# Patient Record
Sex: FEMALE | Race: WHITE | NOT HISPANIC OR LATINO | Employment: OTHER | URBAN - METROPOLITAN AREA
[De-identification: names, ages, dates, MRNs, and addresses within clinical notes are randomized per-mention and may not be internally consistent; named-entity substitution may affect disease eponyms.]

---

## 2024-07-09 ENCOUNTER — APPOINTMENT (OUTPATIENT)
Dept: GENERAL RADIOLOGY | Facility: CLINIC | Age: 49
End: 2024-07-09
Attending: FAMILY MEDICINE
Payer: COMMERCIAL

## 2024-07-09 ENCOUNTER — HOSPITAL ENCOUNTER (EMERGENCY)
Facility: CLINIC | Age: 49
Discharge: HOME | End: 2024-07-09
Attending: FAMILY MEDICINE | Admitting: FAMILY MEDICINE
Payer: COMMERCIAL

## 2024-07-09 VITALS
OXYGEN SATURATION: 100 % | HEART RATE: 80 BPM | RESPIRATION RATE: 16 BRPM | TEMPERATURE: 98.6 F | DIASTOLIC BLOOD PRESSURE: 88 MMHG | SYSTOLIC BLOOD PRESSURE: 138 MMHG
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
## 2024-07-09 DIAGNOSIS — S40.012A CONTUSION OF LEFT SHOULDER, INITIAL ENCOUNTER: ICD-10-CM

## 2024-07-09 PROCEDURE — 73030 X-RAY EXAM OF SHOULDER: CPT | Mod: LT

## 2024-07-09 PROCEDURE — 99284 EMERGENCY DEPT VISIT MOD MDM: CPT | Performed by: FAMILY MEDICINE

## 2024-07-09 PROCEDURE — 96372 THER/PROPH/DIAG INJ SC/IM: CPT | Performed by: FAMILY MEDICINE

## 2024-07-09 PROCEDURE — 250N000013 HC RX MED GY IP 250 OP 250 PS 637: Performed by: FAMILY MEDICINE

## 2024-07-09 PROCEDURE — 99284 EMERGENCY DEPT VISIT MOD MDM: CPT

## 2024-07-09 PROCEDURE — 250N000011 HC RX IP 250 OP 636: Performed by: FAMILY MEDICINE

## 2024-07-09 RX ORDER — BUPRENORPHINE AND NALOXONE 8; 2 MG/1; MG/1
1 FILM, SOLUBLE BUCCAL; SUBLINGUAL 2 TIMES DAILY PRN
COMMUNITY

## 2024-07-09 RX ORDER — ACETAMINOPHEN 500 MG
1000 TABLET ORAL ONCE
Status: COMPLETED | OUTPATIENT
Start: 2024-07-09 | End: 2024-07-09

## 2024-07-09 RX ORDER — KETOROLAC TROMETHAMINE 15 MG/ML
15 INJECTION, SOLUTION INTRAMUSCULAR; INTRAVENOUS ONCE
Status: COMPLETED | OUTPATIENT
Start: 2024-07-09 | End: 2024-07-09

## 2024-07-09 RX ORDER — IBUPROFEN 800 MG/1
800 TABLET, FILM COATED ORAL EVERY 8 HOURS PRN
Qty: 15 TABLET | Refills: 0 | Status: SHIPPED | OUTPATIENT
Start: 2024-07-09 | End: 2024-07-14

## 2024-07-09 RX ADMIN — KETOROLAC TROMETHAMINE 15 MG: 15 INJECTION, SOLUTION INTRAMUSCULAR; INTRAVENOUS at 19:58

## 2024-07-09 RX ADMIN — ACETAMINOPHEN 1000 MG: 500 TABLET ORAL at 20:37

## 2024-07-09 ASSESSMENT — COLUMBIA-SUICIDE SEVERITY RATING SCALE - C-SSRS
6. HAVE YOU EVER DONE ANYTHING, STARTED TO DO ANYTHING, OR PREPARED TO DO ANYTHING TO END YOUR LIFE?: NO
1. IN THE PAST MONTH, HAVE YOU WISHED YOU WERE DEAD OR WISHED YOU COULD GO TO SLEEP AND NOT WAKE UP?: NO
2. HAVE YOU ACTUALLY HAD ANY THOUGHTS OF KILLING YOURSELF IN THE PAST MONTH?: NO

## 2024-07-09 ASSESSMENT — ACTIVITIES OF DAILY LIVING (ADL)
ADLS_ACUITY_SCORE: 37

## 2024-07-10 NOTE — ED PROVIDER NOTES
[facility] EMERGENCY DEPARTMENT ([facility])    7/09/24      ED PROVIDER NOTE       History     Chief Complaint   Patient presents with    Shoulder Injury     HPI  Ambar Borges is a 49 year old female with a history of malignant neoplasm of thyroid gland who presents with a shoulder injury.  Patient was walking her dog and fell with the dog pulling on the leash landing on her left shoulder is now having increased left shoulder pain worse with movement.    Past Medical History  Past Medical History:   Diagnosis Date    Chronic low back pain     Primary thyroid cancer (H)      Past Surgical History:   Procedure Laterality Date    HC HEMORROIDECTOMY, EXTERNAL, SINGLE COLUMN/GROUP  2006    HC REPAIR OF NASAL SEPTUM  2008    HC TOOTH EXTRACTION W/FORCEP  2010    nerve damage to sensation of lower jaw    MAMMOPLASTY AUGMENTATION  2004    THYROIDECTOMY  6/1/2012    Procedure:THYROIDECTOMY; Total Thyroidectomy and Selective Neck Dissection; Surgeon:KELSEY DONOVAN; Location:[facility] OR     buprenorphine HCl-naloxone HCl (SUBOXONE) 8-2 MG per film  ibuprofen (ADVIL/MOTRIN) 800 MG tablet  levothyroxine (SYNTHROID, LEVOTHROID) 150 MCG tablet  B Complex Vitamins (VITAMIN  B COMPLEX) tablet  cholecalciferol (VITAMIN D3) 5000 UNIT CAPS capsule  HYDROcodone-acetaminophen  MG per tablet  ibuprofen (ADVIL) 200 MG capsule  METHADONE HCL PO  Multiple Vitamin (MULTI-VITAMIN) per tablet  PROGESTERONE MICRONIZED PO  sertraline (ZOLOFT) 50 MG tablet  tiZANidine (ZANAFLEX) 4 MG tablet  traMADol (ULTRAM) 25 MG TABS      No Known Allergies  Family History  No family history on file.  Social History   Social History     Substance Use Topics    Alcohol use: Yes     Comment: Infrequently    Drug use: No      A medically appropriate review of systems was performed with pertinent positives and negatives noted in the HPI, and all other systems negative.    Physical Exam   BP: 135/83  Pulse: 107  Temp: 98.6  F (37  C)  Resp: 16  SpO2:  99 %  Physical Exam  Constitutional:       General: She is not in acute distress.     Appearance: Normal appearance. She is not diaphoretic.   HENT:      Head: Atraumatic.      Mouth/Throat:      Mouth: Mucous membranes are moist.   Eyes:      General: No scleral icterus.     Conjunctiva/sclera: Conjunctivae normal.   Cardiovascular:      Rate and Rhythm: Normal rate.      Heart sounds: Normal heart sounds.   Pulmonary:      Effort: No respiratory distress.      Breath sounds: Normal breath sounds.   Abdominal:      General: Abdomen is flat.   Musculoskeletal:      Left shoulder: Tenderness present. Decreased range of motion.      Cervical back: Neck supple.   Skin:     General: Skin is warm.      Findings: No rash.   Neurological:      General: No focal deficit present.      Mental Status: She is alert and oriented to person, place, and time.      Sensory: No sensory deficit.      Motor: No weakness.      Coordination: Coordination normal.           ED Course, Procedures, & Data      Procedures         Results for orders placed or performed during the hospital encounter of 07/09/24   XR Shoulder Left 3 Views     Status: None    Narrative    EXAM: XR SHOULDER LEFT G/E 3 VIEWS  LOCATION: [facility]  DATE: 7/9/2024    INDICATION: fall, pain  COMPARISON: None.      Impression    IMPRESSION: No acute fracture or malalignment. No significant degenerative changes.     Medications   ketorolac (TORADOL) injection 15 mg (15 mg Intramuscular $Given 7/9/24 1958)   acetaminophen (TYLENOL) tablet 1,000 mg (1,000 mg Oral $Given 7/9/24 2037)     Labs Ordered and Resulted from Time of ED Arrival to Time of ED Departure - No data to display  XR Shoulder Left 3 Views   Final Result   IMPRESSION: No acute fracture or malalignment. No significant degenerative changes.             Critical care was not performed.     Medical Decision Making  The patient's presentation was of moderate  complexity (an acute illness with systemic symptoms).    The patient's evaluation involved:  ordering and/or review of 1 test(s) in this encounter (see separate area of note for details)    The patient's management necessitated moderate risk (prescription drug management including medications given in the ED).    Assessment & Plan        I have reviewed the nursing notes. I have reviewed the findings, diagnosis, plan and need for follow up with the patient.    Discharge Medication List as of 7/9/2024 10:15 PM        START taking these medications    Details   ibuprofen (ADVIL/MOTRIN) 800 MG tablet Take 1 tablet (800 mg) by mouth every 8 hours as needed for moderate pain, Disp-15 tablet, R-0, Local Print             Final diagnoses:   Fall, initial encounter   Contusion of left shoulder, initial encounter         [facility] EMERGENCY DEPARTMENT  7/9/2024     Jarad Kelley MD  07/10/24 1932

## 2024-07-10 NOTE — DISCHARGE INSTRUCTIONS
Discharge to home with plans to use ibuprofen for discomfort with follow-up with your primary MD for possible physical therapy referral may use a sling for the next 2 to 3 days for comfort.

## 2024-07-10 NOTE — ED TRIAGE NOTES
Patient was holding dogs leash and it pulled to run after a squirrel. Pt was standing on deck and fell onto ground directly on L shoulder - about 3.5 feet.

## 2024-09-14 ENCOUNTER — HOSPITAL ENCOUNTER (EMERGENCY)
Facility: CLINIC | Age: 49
Discharge: HOME | End: 2024-09-15
Attending: EMERGENCY MEDICINE | Admitting: EMERGENCY MEDICINE
Payer: COMMERCIAL

## 2024-09-14 DIAGNOSIS — G47.00 INSOMNIA, UNSPECIFIED TYPE: ICD-10-CM

## 2024-09-14 DIAGNOSIS — F10.90 ALCOHOL USE DISORDER: ICD-10-CM

## 2024-09-14 DIAGNOSIS — R44.0 AUDITORY HALLUCINATIONS: ICD-10-CM

## 2024-09-14 DIAGNOSIS — Z72.89 DELIBERATE SELF-CUTTING: ICD-10-CM

## 2024-09-14 LAB
ALBUMIN SERPL BCG-MCNC: 4.7 G/DL (ref 3.5–5.2)
ALP SERPL-CCNC: 158 U/L (ref 40–150)
ALT SERPL W P-5'-P-CCNC: 29 U/L (ref 0–50)
ANION GAP SERPL CALCULATED.3IONS-SCNC: 18 MMOL/L (ref 7–15)
APAP SERPL-MCNC: <5 UG/ML (ref 10–30)
AST SERPL W P-5'-P-CCNC: 28 U/L (ref 0–45)
B-OH-BUTYR SERPL-SCNC: 0.62 MMOL/L
BASOPHILS # BLD AUTO: 0.1 10E3/UL (ref 0–0.2)
BASOPHILS NFR BLD AUTO: 1 %
BILIRUB SERPL-MCNC: 0.3 MG/DL
BUN SERPL-MCNC: 11.7 MG/DL (ref 6–20)
CALCIUM SERPL-MCNC: 9.8 MG/DL (ref 8.8–10.4)
CHLORIDE SERPL-SCNC: 101 MMOL/L (ref 98–107)
CREAT SERPL-MCNC: 0.75 MG/DL (ref 0.51–0.95)
EGFRCR SERPLBLD CKD-EPI 2021: >90 ML/MIN/1.73M2
EOSINOPHIL # BLD AUTO: 0 10E3/UL (ref 0–0.7)
EOSINOPHIL NFR BLD AUTO: 0 %
ERYTHROCYTE [DISTWIDTH] IN BLOOD BY AUTOMATED COUNT: 11.9 % (ref 10–15)
ETHANOL SERPL-MCNC: 0.12 G/DL
GLUCOSE SERPL-MCNC: 101 MG/DL (ref 70–99)
HCO3 SERPL-SCNC: 21 MMOL/L (ref 22–29)
HCT VFR BLD AUTO: 44.1 % (ref 35–47)
HGB BLD-MCNC: 14.6 G/DL (ref 11.7–15.7)
IMM GRANULOCYTES # BLD: 0 10E3/UL
IMM GRANULOCYTES NFR BLD: 0 %
LACTATE SERPL-SCNC: 2.8 MMOL/L (ref 0.7–2)
LYMPHOCYTES # BLD AUTO: 1.6 10E3/UL (ref 0.8–5.3)
LYMPHOCYTES NFR BLD AUTO: 30 %
MAGNESIUM SERPL-MCNC: 1.8 MG/DL (ref 1.7–2.3)
MCH RBC QN AUTO: 31.1 PG (ref 26.5–33)
MCHC RBC AUTO-ENTMCNC: 33.1 G/DL (ref 31.5–36.5)
MCV RBC AUTO: 94 FL (ref 78–100)
MONOCYTES # BLD AUTO: 0.3 10E3/UL (ref 0–1.3)
MONOCYTES NFR BLD AUTO: 5 %
NEUTROPHILS # BLD AUTO: 3.5 10E3/UL (ref 1.6–8.3)
NEUTROPHILS NFR BLD AUTO: 64 %
NRBC # BLD AUTO: 0 10E3/UL
NRBC BLD AUTO-RTO: 0 /100
PLATELET # BLD AUTO: 381 10E3/UL (ref 150–450)
POTASSIUM SERPL-SCNC: 3.9 MMOL/L (ref 3.4–5.3)
PROT SERPL-MCNC: 7.8 G/DL (ref 6.4–8.3)
RBC # BLD AUTO: 4.7 10E6/UL (ref 3.8–5.2)
SALICYLATES SERPL-MCNC: <0.3 MG/DL
SODIUM SERPL-SCNC: 140 MMOL/L (ref 135–145)
T4 FREE SERPL-MCNC: 1.55 NG/DL (ref 0.9–1.7)
TROPONIN T SERPL HS-MCNC: 16 NG/L
TSH SERPL DL<=0.005 MIU/L-ACNC: 0.72 UIU/ML (ref 0.3–4.2)
WBC # BLD AUTO: 5.4 10E3/UL (ref 4–11)

## 2024-09-14 PROCEDURE — 82077 ASSAY SPEC XCP UR&BREATH IA: CPT | Performed by: EMERGENCY MEDICINE

## 2024-09-14 PROCEDURE — 250N000013 HC RX MED GY IP 250 OP 250 PS 637: Performed by: EMERGENCY MEDICINE

## 2024-09-14 PROCEDURE — 250N000011 HC RX IP 250 OP 636: Performed by: EMERGENCY MEDICINE

## 2024-09-14 PROCEDURE — 84439 ASSAY OF FREE THYROXINE: CPT | Performed by: EMERGENCY MEDICINE

## 2024-09-14 PROCEDURE — 80053 COMPREHEN METABOLIC PANEL: CPT | Performed by: EMERGENCY MEDICINE

## 2024-09-14 PROCEDURE — 99284 EMERGENCY DEPT VISIT MOD MDM: CPT | Mod: 25

## 2024-09-14 PROCEDURE — 82010 KETONE BODYS QUAN: CPT | Performed by: EMERGENCY MEDICINE

## 2024-09-14 PROCEDURE — 80143 DRUG ASSAY ACETAMINOPHEN: CPT | Performed by: EMERGENCY MEDICINE

## 2024-09-14 PROCEDURE — 85025 COMPLETE CBC W/AUTO DIFF WBC: CPT | Performed by: EMERGENCY MEDICINE

## 2024-09-14 PROCEDURE — 84443 ASSAY THYROID STIM HORMONE: CPT | Performed by: EMERGENCY MEDICINE

## 2024-09-14 PROCEDURE — 80179 DRUG ASSAY SALICYLATE: CPT | Performed by: EMERGENCY MEDICINE

## 2024-09-14 PROCEDURE — 96374 THER/PROPH/DIAG INJ IV PUSH: CPT

## 2024-09-14 PROCEDURE — 83605 ASSAY OF LACTIC ACID: CPT | Performed by: EMERGENCY MEDICINE

## 2024-09-14 PROCEDURE — 83735 ASSAY OF MAGNESIUM: CPT | Performed by: EMERGENCY MEDICINE

## 2024-09-14 PROCEDURE — 96375 TX/PRO/DX INJ NEW DRUG ADDON: CPT

## 2024-09-14 PROCEDURE — 93005 ELECTROCARDIOGRAM TRACING: CPT

## 2024-09-14 PROCEDURE — 36415 COLL VENOUS BLD VENIPUNCTURE: CPT | Performed by: EMERGENCY MEDICINE

## 2024-09-14 PROCEDURE — 84484 ASSAY OF TROPONIN QUANT: CPT | Mod: 91 | Performed by: EMERGENCY MEDICINE

## 2024-09-14 RX ORDER — OLANZAPINE 10 MG/1
10 TABLET, ORALLY DISINTEGRATING ORAL ONCE
Status: COMPLETED | OUTPATIENT
Start: 2024-09-14 | End: 2024-09-14

## 2024-09-14 RX ORDER — HALOPERIDOL 5 MG/ML
5 INJECTION INTRAMUSCULAR ONCE
Status: COMPLETED | OUTPATIENT
Start: 2024-09-14 | End: 2024-09-14

## 2024-09-14 RX ORDER — LORAZEPAM 2 MG/ML
1 INJECTION INTRAMUSCULAR ONCE
Status: COMPLETED | OUTPATIENT
Start: 2024-09-14 | End: 2024-09-14

## 2024-09-14 RX ADMIN — LORAZEPAM 1 MG: 2 INJECTION INTRAMUSCULAR; INTRAVENOUS at 22:59

## 2024-09-14 RX ADMIN — HALOPERIDOL LACTATE 5 MG: 5 INJECTION, SOLUTION INTRAMUSCULAR at 23:00

## 2024-09-14 RX ADMIN — OLANZAPINE 10 MG: 10 TABLET, ORALLY DISINTEGRATING ORAL at 20:44

## 2024-09-14 ASSESSMENT — COLUMBIA-SUICIDE SEVERITY RATING SCALE - C-SSRS
1. IN THE PAST MONTH, HAVE YOU WISHED YOU WERE DEAD OR WISHED YOU COULD GO TO SLEEP AND NOT WAKE UP?: NO
2. HAVE YOU ACTUALLY HAD ANY THOUGHTS OF KILLING YOURSELF IN THE PAST MONTH?: NO
6. HAVE YOU EVER DONE ANYTHING, STARTED TO DO ANYTHING, OR PREPARED TO DO ANYTHING TO END YOUR LIFE?: NO

## 2024-09-14 ASSESSMENT — ACTIVITIES OF DAILY LIVING (ADL)
ADLS_ACUITY_SCORE: 37

## 2024-09-15 VITALS
TEMPERATURE: 98.5 F | HEART RATE: 75 BPM | SYSTOLIC BLOOD PRESSURE: 118 MMHG | WEIGHT: 135 LBS | RESPIRATION RATE: 12 BRPM | DIASTOLIC BLOOD PRESSURE: 77 MMHG | HEIGHT: 64 IN | BODY MASS INDEX: 23.05 KG/M2 | OXYGEN SATURATION: 97 %

## 2024-09-15 PROBLEM — F32.9 MAJOR DEPRESSION: Status: ACTIVE | Noted: 2024-09-15

## 2024-09-15 PROBLEM — F41.9 ANXIETY: Status: ACTIVE | Noted: 2024-09-15

## 2024-09-15 LAB
AMPHETAMINES UR QL SCN: ABNORMAL
ATRIAL RATE - MUSE: 105 BPM
BARBITURATES UR QL SCN: ABNORMAL
BENZODIAZ UR QL SCN: ABNORMAL
BZE UR QL SCN: ABNORMAL
CANNABINOIDS UR QL SCN: ABNORMAL
DIASTOLIC BLOOD PRESSURE - MUSE: NORMAL MMHG
FENTANYL UR QL: ABNORMAL
INTERPRETATION ECG - MUSE: NORMAL
LACTATE SERPL-SCNC: 0.7 MMOL/L (ref 0.7–2)
OPIATES UR QL SCN: ABNORMAL
P AXIS - MUSE: 76 DEGREES
PCP QUAL URINE (ROCHE): ABNORMAL
PR INTERVAL - MUSE: 144 MS
QRS DURATION - MUSE: 86 MS
QT - MUSE: 338 MS
QTC - MUSE: 446 MS
R AXIS - MUSE: 77 DEGREES
SYSTOLIC BLOOD PRESSURE - MUSE: NORMAL MMHG
T AXIS - MUSE: 55 DEGREES
TROPONIN T SERPL HS-MCNC: 15 NG/L
VENTRICULAR RATE- MUSE: 105 BPM

## 2024-09-15 PROCEDURE — 83605 ASSAY OF LACTIC ACID: CPT | Performed by: EMERGENCY MEDICINE

## 2024-09-15 PROCEDURE — 96361 HYDRATE IV INFUSION ADD-ON: CPT

## 2024-09-15 PROCEDURE — 36415 COLL VENOUS BLD VENIPUNCTURE: CPT | Performed by: EMERGENCY MEDICINE

## 2024-09-15 PROCEDURE — 258N000003 HC RX IP 258 OP 636: Performed by: EMERGENCY MEDICINE

## 2024-09-15 PROCEDURE — 80307 DRUG TEST PRSMV CHEM ANLYZR: CPT | Performed by: EMERGENCY MEDICINE

## 2024-09-15 RX ORDER — IBUPROFEN 600 MG/1
600 TABLET, FILM COATED ORAL EVERY 6 HOURS PRN
Status: DISCONTINUED | OUTPATIENT
Start: 2024-09-15 | End: 2024-09-15 | Stop reason: HOSPADM

## 2024-09-15 RX ORDER — HYDROXYZINE HYDROCHLORIDE 25 MG/1
25 TABLET, FILM COATED ORAL EVERY 4 HOURS PRN
Status: DISCONTINUED | OUTPATIENT
Start: 2024-09-15 | End: 2024-09-15 | Stop reason: HOSPADM

## 2024-09-15 RX ORDER — LEVOTHYROXINE SODIUM 150 UG/1
150 TABLET ORAL DAILY
Status: DISCONTINUED | OUTPATIENT
Start: 2024-09-15 | End: 2024-09-15 | Stop reason: HOSPADM

## 2024-09-15 RX ORDER — PROGESTERONE 100 MG/1
200 CAPSULE ORAL AT BEDTIME
Status: DISCONTINUED | OUTPATIENT
Start: 2024-09-15 | End: 2024-09-15 | Stop reason: HOSPADM

## 2024-09-15 RX ORDER — OLANZAPINE 10 MG/1
10 TABLET, ORALLY DISINTEGRATING ORAL 2 TIMES DAILY PRN
Status: DISCONTINUED | OUTPATIENT
Start: 2024-09-15 | End: 2024-09-15 | Stop reason: HOSPADM

## 2024-09-15 RX ORDER — PROGESTERONE 100 MG/1
200 CAPSULE ORAL AT BEDTIME
COMMUNITY

## 2024-09-15 RX ORDER — CLONAZEPAM 0.5 MG/1
0.5 TABLET ORAL 3 TIMES DAILY PRN
COMMUNITY

## 2024-09-15 RX ADMIN — SODIUM CHLORIDE 1000 ML: 9 INJECTION, SOLUTION INTRAVENOUS at 01:45

## 2024-09-15 RX ADMIN — SODIUM CHLORIDE 1000 ML: 9 INJECTION, SOLUTION INTRAVENOUS at 00:34

## 2024-09-15 ASSESSMENT — COLUMBIA-SUICIDE SEVERITY RATING SCALE - C-SSRS
2. HAVE YOU ACTUALLY HAD ANY THOUGHTS OF KILLING YOURSELF?: YES
4. HAVE YOU HAD THESE THOUGHTS AND HAD SOME INTENTION OF ACTING ON THEM?: NO
LETHALITY/MEDICAL DAMAGE CODE MOST LETHAL ACTUAL ATTEMPT: NO PHYSICAL DAMAGE OR VERY MINOR PHYSICAL DAMAGE
3. HAVE YOU BEEN THINKING ABOUT HOW YOU MIGHT KILL YOURSELF?: YES
LETHALITY/MEDICAL DAMAGE CODE MOST LETHAL POTENTIAL ATTEMPT: BEHAVIOR LIKELY TO RESULT IN INJURY BUT NOT LIKELY TO CAUSE DEATH
ATTEMPT LIFETIME: YES
TOTAL  NUMBER OF ACTUAL ATTEMPTS LIFETIME: 2
1. HAVE YOU WISHED YOU WERE DEAD OR WISHED YOU COULD GO TO SLEEP AND NOT WAKE UP?: YES
4. HAVE YOU HAD THESE THOUGHTS AND HAD SOME INTENTION OF ACTING ON THEM?: YES
TOTAL  NUMBER OF ABORTED OR SELF INTERRUPTED ATTEMPTS LIFETIME: NO
LETHALITY/MEDICAL DAMAGE CODE FIRST POTENTIAL ATTEMPT: BEHAVIOR LIKELY TO RESULT IN INJURY BUT NOT LIKELY TO CAUSE DEATH
ATTEMPT PAST THREE MONTHS: NO
LETHALITY/MEDICAL DAMAGE CODE MOST RECENT POTENTIAL ATTEMPT: BEHAVIOR LIKELY TO RESULT IN INJURY BUT NOT LIKELY TO CAUSE DEATH
LETHALITY/MEDICAL DAMAGE CODE MOST RECENT ACTUAL ATTEMPT: NO PHYSICAL DAMAGE OR VERY MINOR PHYSICAL DAMAGE
LETHALITY/MEDICAL DAMAGE CODE FIRST ACTUAL ATTEMPT: NO PHYSICAL DAMAGE OR VERY MINOR PHYSICAL DAMAGE
TOTAL  NUMBER OF INTERRUPTED ATTEMPTS LIFETIME: NO
5. HAVE YOU STARTED TO WORK OUT OR WORKED OUT THE DETAILS OF HOW TO KILL YOURSELF? DO YOU INTEND TO CARRY OUT THIS PLAN?: YES
REASONS FOR IDEATION PAST MONTH: COMPLETELY TO END OR STOP THE PAIN (YOU COULDN'T GO ON LIVING WITH THE PAIN OR HOW YOU WERE FEELING)
5. HAVE YOU STARTED TO WORK OUT OR WORKED OUT THE DETAILS OF HOW TO KILL YOURSELF? DO YOU INTEND TO CARRY OUT THIS PLAN?: NO
2. HAVE YOU ACTUALLY HAD ANY THOUGHTS OF KILLING YOURSELF?: NO
1. IN THE PAST MONTH, HAVE YOU WISHED YOU WERE DEAD OR WISHED YOU COULD GO TO SLEEP AND NOT WAKE UP?: YES

## 2024-09-15 ASSESSMENT — ACTIVITIES OF DAILY LIVING (ADL)
ADLS_ACUITY_SCORE: 37

## 2024-09-15 NOTE — ED PROVIDER NOTES
"                                  Emergency Department                         Assumed Care Note      Patient signed out to me by Dr Cerna.    Briefly, Ambar Borges is a 49 year old female is being evaluated for auditory command hallucinations. Reluctant with discussing SI/HI, is on VICKIE.    /77   Pulse 88   Temp 98.5  F (36.9  C) (Temporal)   Resp 11   Ht 1.626 m (5' 4\")   Wt 61.2 kg (135 lb)   LMP 03/15/2012   SpO2 96%   BMI 23.17 kg/m      Thus far, studies reveal:     Labs Ordered and Resulted from Time of ED Arrival to Time of ED Departure   COMPREHENSIVE METABOLIC PANEL - Abnormal       Result Value    Sodium 140      Potassium 3.9      Carbon Dioxide (CO2) 21 (*)     Anion Gap 18 (*)     Urea Nitrogen 11.7      Creatinine 0.75      GFR Estimate >90      Calcium 9.8      Chloride 101      Glucose 101 (*)     Alkaline Phosphatase 158 (*)     AST 28      ALT 29      Protein Total 7.8      Albumin 4.7      Bilirubin Total 0.3     ETHYL ALCOHOL LEVEL - Abnormal    Alcohol ethyl 0.12 (*)    ACETAMINOPHEN LEVEL - Abnormal    Acetaminophen <5.0 (*)    TROPONIN T, HIGH SENSITIVITY - Abnormal    Troponin T, High Sensitivity 16 (*)    KETONE BETA-HYDROXYBUTYRATE QUANTITATIVE, RAPID - Abnormal    Ketone (Beta-Hydroxybutyrate) Quantitative 0.62 (*)    LACTIC ACID WHOLE BLOOD - Abnormal    Lactic Acid 2.8 (*)    TROPONIN T, HIGH SENSITIVITY - Abnormal    Troponin T, High Sensitivity 15 (*)    SALICYLATE LEVEL - Normal    Salicylate <0.3     MAGNESIUM - Normal    Magnesium 1.8     TSH - Normal    TSH 0.72     T4 FREE - Normal    Free T4 1.55     CBC WITH PLATELETS AND DIFFERENTIAL    WBC Count 5.4      RBC Count 4.70      Hemoglobin 14.6      Hematocrit 44.1      MCV 94      MCH 31.1      MCHC 33.1      RDW 11.9      Platelet Count 381      % Neutrophils 64      % Lymphocytes 30      % Monocytes 5      % Eosinophils 0      % Basophils 1      % Immature Granulocytes 0      NRBCs per 100 WBC 0      Absolute " Neutrophils 3.5      Absolute Lymphocytes 1.6      Absolute Monocytes 0.3      Absolute Eosinophils 0.0      Absolute Basophils 0.1      Absolute Immature Granulocytes 0.0      Absolute NRBCs 0.0     LACTIC ACID WHOLE BLOOD       No orders to display       Pending studies include: Repeat lactic acid after IVF, suspect lactic acidemia 2/2 to dehydration and alcoholic ketoacidosis.     Haldol, ativan, and zyprexa helped with symptoms.    Plan from sign out is:   So long as down trending lactic acid, may have DEC evaluate. Disposition per DEC.    Progress notes:  ED Course as of 09/15/24 0244   Sat Sep 14, 2024   0243 Lactic Acid: 0.7     0243 -downtrending lactic acid, will consult DEC for assessment and disposition planning.    Clinical impression:  1. Auditory hallucinations    2. Insomnia, unspecified type    3. Alcohol use disorder    4. Deliberate self-cutting      Disposition:  Patient care transitioned to my colleague Dr. Raymond pending DEC evaluation and disposition.    ROBERT FORDE DO  9/15/2024     Robert Forde,   09/15/24 0743       Robert Forde DO  09/15/24 0744

## 2024-09-15 NOTE — ED PROVIDER NOTES
Patient was signed out to me awaiting DEC evaluation.  DEC assessed the patient and she no longer has any intrusive thoughts or hallucinations.  She does admit that she was under a lot of stress and she was drinking alcohol.  She contracts for safety, does not feel she needs anything else from us.  She has appointment with her psychiatrist this week.  She will follow-up and keep those appointments.     Kar Raymond MD  09/15/24 4742

## 2024-09-15 NOTE — PHARMACY-ADMISSION MEDICATION HISTORY
Pharmacist Admission Medication History    Admission medication history is complete. The information provided in this note is only as accurate as the sources available at the time of the update.    Information Source(s): Patient and CareEverywhere/SureScripts via in-person    Pertinent Information: None    Changes made to PTA medication list:  Added: tumeric, clonazepam  Deleted: vitamin d, hydrocodone/apap, methadone, multivitamin, sertraline, tizanidine, tramadol  Changed: ibuprofen dose, suboxone; 1 film daily to 1 film BID prn    Allergies reviewed with patient and updates made in EHR: no    Medication History Completed By: Jorge Hanley [facility] 9/15/2024 8:50 AM    PTA Med List   Medication Sig Last Dose    B Complex Vitamins (VITAMIN  B COMPLEX) tablet Take 1 tablet by mouth daily.     buprenorphine HCl-naloxone HCl (SUBOXONE) 8-2 MG per film Place 1 Film under the tongue 2 times daily as needed (symptoms).  at prn    clonazePAM (KLONOPIN) 0.5 MG tablet Take 0.5 mg by mouth 3 times daily as needed for anxiety.  at prn    ibuprofen (ADVIL) 200 MG capsule Take 600 mg by mouth 2 times daily as needed for mild pain.  at prn    levothyroxine (SYNTHROID, LEVOTHROID) 150 MCG tablet Take 1 tablet by mouth daily. 9/14/2024 at AM    progesterone (PROMETRIUM) 100 MG capsule Take 200 mg by mouth at bedtime. 9/14/2024 at PM    Turmeric (QC TUMERIC COMPLEX PO) Take 1 capsule by mouth daily.

## 2024-09-15 NOTE — ED PROVIDER NOTES
"  Emergency Department Note      History of Present Illness     Chief Complaint   Hallucinations      HPI   Ambar Borges is a 49 year old female with history of thyroid cancer s/p thyroidectomy, chronic alcoholic liver disease, panic disorder, MDD, anxiety, insomnia, and opioid dependence who presents to the ED for psychiatric evaluation and auditory hallucinations. The patient reports she is currently having a panic attack and \"does not feel safe with her thoughts.\" States she has been \"overstimulated\" for days and not sleeping for four days. Notes her panic disorder is controlled. She is complaint with her levothyroxine and takes suboxone and klonopin. Ambar reports she is hearing her \" and son yelling at her.\" She has been taking Benadryl and alcohol to \"knock herself out\" and has a history of self harming.  Notes a cut on her left wrist and states she hasn't done that in a long time.  Pauses a few times during history taking to try and calm her thoughts so she doesn't pass out.  Has not passed out.  Endorses regular alcohol use and caffeine use, no other stimulants. When asked about suicidal ideation she cannot answer it.  When I ask if she can keep herself safe in the lobby and remain here for evaluation she states she does not know.  Denies medical concerns, meth use, or taking extra pills or substances. Patient reports she feels like she is going to pass out.        Triage nurse is still in the room when I came for assessment, he spoke with her  who brought her in.  He also has not slept much in four days.  Reassured that she is in the right place and that he can come check back in the morning after he gets some rest.  I did not speak with him.    Independent Historian   None    Review of External Notes   PDMP - suboxone 8-2; clonazepam 0.5mg both regularly prescribed  PCP visit Feb 2024 - has addiction specialist, uses clonazepam as needed, not every day    Past Medical History     Medical " "History and Problem List   Chronic low back pain  Primary thyroid cancer   Hypothyroidism  Hirsutism  Oligomenorrhea   Arthritis  Asthma  Chronic alcoholic liver disease  Fibromyalgia  Lichen sclerosus  MDD   Opioid dependence   Panic disorder  Spinal stenosis  Testosterone deficiency   Anxiety   DDD, lumbar   Insomnia     Medications   Suboxone   Hydrocodone-acetaminophen  Synthroid   Methadone HCL  Progesterone  Zoloft  Zanaflex  Ultram   Klonopin     Surgical History   Hemorrhoidectomy, external, single column/group  Septoplasty   Tooth extraction   Mammoplasty augmentation  Total thyroidectomy and selective neck dissection  Dilation and curettage     Physical Exam     Patient Vitals for the past 24 hrs:   BP Temp Temp src Pulse Resp SpO2 Height Weight   09/14/24 2315 -- -- -- 89 17 97 % -- --   09/14/24 2300 133/77 -- -- 89 19 99 % -- --   09/14/24 2004 (!) 155/94 98.5  F (36.9  C) Temporal 111 20 98 % 1.626 m (5' 4\") 61.2 kg (135 lb)     Physical Exam  Eyes:  Sclera white; Pupils are equal and round  ENT:    External ears and nares normal  CV:  Rate as above with regular rhythm   Resp:  Breath sounds clear and equal bilaterally    Non-labored, no retractions or accessory muscle use  GI:  Abdomen is soft, non-tender, non-distended    No rebound tenderness or peritoneal features  MS:  Moves all extremities  Skin:  Warm and dry  Neuro:  Speech is normal and fluent. No apparent deficit.  Psych:  Interrupts her own speech to point her hands at her head, place hands on her head, ask for a few moments where she closes her eyes and then will start talking again.  Is being vague and partially answering several questions.    Diagnostics     Lab Results   Labs Ordered and Resulted from Time of ED Arrival to Time of ED Departure   COMPREHENSIVE METABOLIC PANEL - Abnormal       Result Value    Sodium 140      Potassium 3.9      Carbon Dioxide (CO2) 21 (*)     Anion Gap 18 (*)     Urea Nitrogen 11.7      Creatinine 0.75      " GFR Estimate >90      Calcium 9.8      Chloride 101      Glucose 101 (*)     Alkaline Phosphatase 158 (*)     AST 28      ALT 29      Protein Total 7.8      Albumin 4.7      Bilirubin Total 0.3     ETHYL ALCOHOL LEVEL - Abnormal    Alcohol ethyl 0.12 (*)    ACETAMINOPHEN LEVEL - Abnormal    Acetaminophen <5.0 (*)    TROPONIN T, HIGH SENSITIVITY - Abnormal    Troponin T, High Sensitivity 16 (*)    KETONE BETA-HYDROXYBUTYRATE QUANTITATIVE, RAPID - Abnormal    Ketone (Beta-Hydroxybutyrate) Quantitative 0.62 (*)    LACTIC ACID WHOLE BLOOD - Abnormal    Lactic Acid 2.8 (*)    TROPONIN T, HIGH SENSITIVITY - Abnormal    Troponin T, High Sensitivity 15 (*)    SALICYLATE LEVEL - Normal    Salicylate <0.3     MAGNESIUM - Normal    Magnesium 1.8     TSH - Normal    TSH 0.72     T4 FREE - Normal    Free T4 1.55     CBC WITH PLATELETS AND DIFFERENTIAL    WBC Count 5.4      RBC Count 4.70      Hemoglobin 14.6      Hematocrit 44.1      MCV 94      MCH 31.1      MCHC 33.1      RDW 11.9      Platelet Count 381      % Neutrophils 64      % Lymphocytes 30      % Monocytes 5      % Eosinophils 0      % Basophils 1      % Immature Granulocytes 0      NRBCs per 100 WBC 0      Absolute Neutrophils 3.5      Absolute Lymphocytes 1.6      Absolute Monocytes 0.3      Absolute Eosinophils 0.0      Absolute Basophils 0.1      Absolute Immature Granulocytes 0.0      Absolute NRBCs 0.0     LACTIC ACID WHOLE BLOOD       Imaging   No orders to display     EKG   ECG results from 09/14/24   EKG 12-lead, tracing only     Value    Systolic Blood Pressure     Diastolic Blood Pressure     Ventricular Rate 105    Atrial Rate 105    OH Interval 144    QRS Duration 86        QTc 446    P Axis 76    R AXIS 77    T Axis 55    Interpretation ECG      Sinus tachycardia  Possible Left atrial enlargement  Borderline ECG  No previous ECGs available      Independent Interpretation   None    ED Course      Medications Administered   Medications   OLANZapine  zydis (zyPREXA) ODT tab 10 mg (10 mg Oral $Given 9/14/24 2044)   haloperidol lactate (HALDOL) injection 5 mg (5 mg Intravenous $Given 9/14/24 2300)   LORazepam (ATIVAN) injection 1 mg (1 mg Intravenous $Given 9/14/24 2259)       Procedures   Procedures     Discussion of Management   None    ED Course   ED Course as of 09/15/24 0010   Sat Sep 14, 2024   2023 I obtained history and examined the patient as noted above. Placed patient on VICKIE.   Sun Sep 15, 2024   0010 I rechecked the patient and explained findings. She is sleeping.        Additional Documentation  Stress/Adjustment Disorders    Medical Decision Making / Diagnosis     CMS Diagnoses: Elevated lactic is due to alcohol related ketosis and suspected dehydration, no focal symptoms of infection.  Sepsis not suspected.    MIPS       None    MDM   She is not currently able to contract for safety and is clearly struggling with the voices she is hearing.  Hold placed for further medical and mental health evaluation.  Initially tachycardic which improved after medications.  She was not able to give a urine sample and IV fluids were given.  Lactic acid returned abnormal which would be contributing to her anion gap along with her mild ketones.  This is suspected to be secondary to her alcohol use and not to infection as there are no focal findings to suggest infection and WBC is normal.  EKG w/o ischemia, dysrhythmia, or pericarditis.  Serial troponins stable, this is not ACS.  Thyroid testing is normal so continue current thyroid dosing.  Repeat lactic after additional IV fluids.      Zyprexa helped her feel a little calmer but not sleep, slept some after Haldol and Ativan and was calmer afterwards when asking about her .      If lactic normalizing on reassessment and she is eating, then I would consider her medically cleared for DEC assessment.  However if showing signs of alcohol withdrawal or failing to clear the lactic with fluids, then will need further  medical management.      Disposition   Signed out    Diagnosis     ICD-10-CM    1. Auditory hallucinations  R44.0       2. Insomnia, unspecified type  G47.00       3. Alcohol use disorder  F10.90          Scribe Disclosure:  I, Tiffany Rebolledo, am serving as a scribe at 8:43 PM on 9/14/2024 to document services personally performed by Pam Cerna MD based on my observations and the provider's statements to me.        Pam Cerna MD  09/15/24 0142

## 2024-09-15 NOTE — ED NOTES
9/14/2024  Ambar Borges 1975     Writer consulted with ED RN,  on this date at  4:21 AM. It was determined that pt would not benefit from assessment at this time due to Pt unable able to participate in assessment due to patient just took medication that made them asleep.    ED will call DEC at [phone] when pt is ready and able to participate in assessment.     Judi Doll

## 2024-09-15 NOTE — ED NOTES
Hand off given to bernarda RAMON.    Rox Valdez RN,.......................................... 9/15/2024   7:10 AM

## 2024-09-15 NOTE — ED NOTES
Received call from DEC for a possible assessment at this time. Pt currently very sleepy and unable to participate in assessment.    Pt to have an assessment this morning.    Rox Valdez RN,.......................................... 9/15/2024   4:16 AM

## 2024-09-15 NOTE — CONSULTS
Diagnostic Evaluation Consultation  Crisis Assessment    Patient Name: Ambar Borges  Age:  49 year old  Legal Sex: female  Gender Identity: female  Pronouns:   Race: White  Ethnicity: Not  or   Language: English      Patient was assessed: In person   Crisis Assessment Start Date: 09/15/24  Crisis Assessment Start Time: 1000  Crisis Assessment Stop Time: 1033  Patient location: [facility] EMERGENCY DEPT                             ED17    Referral Data and Chief Complaint  Ambar Borges presents to the ED with family/friends. Patient is presenting to the ED for the following concerns: Significant behavioral change, Suicidal ideation, Other (see comment), Anxiety (auditory hallucinations).   Factors that make the mental health crisis life threatening or complex are:  Pt presents to the ED with her  due to command auditory hallucinations, sleep disturbance, lack of appetite and panic attacks. Pt reports she has not been sleeping well for the past 4 days. Pt reports she has gotten about 2 hours of sleep for the past few nights, has not been having an appetite, increase of panic attacks occurring daily, and hearing her  in her head telling her negative things such as to not look at her phone, they might be getting a divorce. Pt reports she has never experienced auditory hallucinations prior to last night. When discussing suicidal ideation, Pt denies having any active method, plan, or intent and identified passive wishes of being dead or gone to not have to experience her emotions. Pt endorses NSSIB of using a blade on her wrist 2 days ago and states it was a way for her to test how tough she is and denies it as a suicide attempt. Pt does endorse history of suicide, when she was 5 and 6 by cutting her wrists and drinking . Pt denies any HI, VH. Pt does endorse daily alcohol consumption and reports she had 1-3 beers yesterday. During assessment, Pt reports  her suicidal ideation and the auditory hallucinations are no longer present after getting rest here in the ED.      Informed Consent and Assessment Methods  Explained the crisis assessment process, including applicable information disclosures and limits to confidentiality, assessed understanding of the process, and obtained consent to proceed with the assessment.  Assessment methods included conducting a formal interview with patient, review of medical records, collaboration with medical staff, and obtaining relevant collateral information from family and community providers when available.  : done     Patient response to interventions: acceptance expressed, verbalizes understanding  Coping skills were attempted to reduce the crisis:  Voluntarily presented with      History of the Crisis   Pt reports the auditory hallucinations started last night. Pt reports her sleep has been disrupted for the past 4 nights. Pt reports her appetite has been off for the past 1.5 weeks. Pt reports chronic history of anxiety and panic attacks.    Brief Psychosocial History  Family:  , Children yes  Support System:    Employment Status:  unemployed  Source of Income:  none  Financial Environmental Concerns:  none  Current Hobbies:  gardening, television/movies/videos, social media/computer activities  Barriers in Personal Life:       Significant Clinical History  Current Anxiety Symptoms:  panic attack, shortness of breath or racing heart, anxious  Current Depression/Trauma:  thoughts of death/suicide (denies any suicidal ideation during assessment, endorsed prior to arrival)  Current Somatic Symptoms:  sweating, flushing, shaking, somatic symptoms (abdominal pain, headache, tension), anxious, shortness of breath or racing heart  Current Psychosis/Thought Disturbance:   (reported auditory hallucinations on 09/14/2024, denies currently.)  Current Eating Symptoms:  loss of appetite  Chemical Use History:  Alcohol:  Daily  Last Use:: 09/14/24  Benzodiazepines: Daily use (prescribed medication)  Opiates: Other (comments) (charted history of opioid dependence, no noted concern currently)  Cocaine: None  Marijuana: Other (comments) (unclear of use)  Other Use: None   Past diagnosis:  Anxiety Disorder, Depression, Substance Use Disorder  Family history:  Schizophrenia  Past treatment:  Individual therapy, Psychiatric Medication Management, Primary Care  Details of most recent treatment:  Currently working with a psychiatrist for medication management.  Other relevant history:  No history of CD treatment, reports managing on her own.       Collateral Information  Is there collateral information: No (Writer left voicemail for emergency contact with instructin to call back.)        Risk Assessment  [place] Suicide Severity Rating Scale Full Clinical Version:  Suicidal Ideation  3. Active Suicidal Ideation with any Methods (Not Plan) Without Intent to Act (Lifetime): Yes  Q4 Active Suicidal Ideation with Some Intent to Act, Without Specific Plan (Lifetime): Yes  Q5 Active Suicidal Ideation with Specific Plan and Intent (Lifetime): Yes  Q6 Suicide Behavior (Lifetime): no     Suicidal Behavior (Lifetime)  Actual Attempt (Lifetime): Yes  Total Number of Actual Attempts (Lifetime): 2  Actual Attempt Description (Lifetime): Pt identifies when age 5 and 6 by cutting herself and drinking .  Has subject engaged in non-suicidal self-injurious behavior? (Lifetime): Yes  Interrupted Attempts (Lifetime): No  Aborted or Self-Interrupted Attempt (Lifetime): No    [place] Suicide Severity Rating Scale Recent:   Suicidal Ideation (Recent)  Q1 Wished to be Dead (Past Month): no  Wish to be Dead Description (Past 1 Month): Had thoughts of wanting to escape and not have to live through her anxiety or auditory hallucinations forever. During assessment, denies any suicidal ideation.  Q2 Suicidal Thoughts (Past Month): no  Level of Risk per  Screen: no risks indicated  Intensity of Ideation (Recent)  Most Severe Ideation Rating (Past 1 Month): 1  Frequency (Past 1 Month): Less than once a week (Pt identifies in the last 3-5 days of almost daily wishes of being able to escape.)  Duration (Past 1 Month): Fleeting, few seconds or minutes  Controllability (Past 1 Month): Can control thoughts with little difficulty  Deterrents (Past 1 Month): Deterrents definitely stopped you from attempting suicide  Reasons for Ideation (Past 1 Month): Completely to end or stop the pain (You couldn't go on living with the pain or how you were feeling)  Suicidal Behavior (Recent)  Actual Attempt (Past 3 Months): No  Has subject engaged in non-suicidal self-injurious behavior? (Past 3 Months): Yes    Environmental or Psychosocial Events: unemployment/underemployment, ongoing abuse of substances, other (see comment), other life stressors (ongoing medical concerns)  Protective Factors: Protective Factors: strong bond to family unit, community support, or employment, intact marriage or domestic partnership, lives in a responsibly safe and stable environment, responsibilities and duties to others, including pets and children, sense of importance of health and wellness, help seeking, supportive ongoing medical and mental health care relationships, optimistic outlook - identification of future goals    Does the patient have thoughts of harming others? Feels Like Hurting Others: no  Previous Attempt to Hurt Others: no  Is the patient engaging in sexually inappropriate behavior?: no    Is the patient engaging in sexually inappropriate behavior?  no        Mental Status Exam   Affect: Appropriate  Appearance: Appropriate  Attention Span/Concentration: Attentive  Eye Contact: Engaged    Fund of Knowledge: Appropriate   Language /Speech Content: Fluent  Language /Speech Volume: Normal  Language /Speech Rate/Productions: Normal  Recent Memory: Intact  Remote Memory: Intact  Mood:  Anxious  Orientation to Person: Yes   Orientation to Place: Yes  Orientation to Time of Day: Yes  Orientation to Date: Yes     Situation (Do they understand why they are here?): Yes  Psychomotor Behavior: Normal  Thought Content: Clear  Thought Form: Intact     Medication  Psychotropic medications:   Medication Orders - Psychiatric (From admission, onward)      Start     Dose/Rate Route Frequency Ordered Stop    09/15/24 0921  OLANZapine zydis (zyPREXA) ODT tab 10 mg         10 mg Oral 2 TIMES DAILY PRN 09/15/24 0921      09/15/24 0921  hydrOXYzine HCl (ATARAX) tablet 25 mg         25 mg Oral EVERY 4 HOURS PRN 09/15/24 0921               Current Care Team  Patient Care Team:  No Ref-Primary, Physician as PCP - General    Diagnosis  Patient Active Problem List   Diagnosis Code    Malignant neoplasm of thyroid gland (H) C73    Postsurgical hypothyroidism E89.0    Hirsutism L68.0    Oligomenorrhea N91.5    Anxiety F41.9    Major depression F32.9       Primary Problem This Admission  Active Hospital Problems    Anxiety      Major depression        Clinical Summary and Substantiation of Recommendations     Pt reports she has not been sleeping well for the past 4 days. Pt reports she has gotten about 2 hours of sleep for the past few nights, has not been having an appetite, increase of panic attacks occurring daily, and hearing her  in her head telling her negative things such as to not look at her phone, they might be getting a divorce. Pt reports she has never experienced auditory hallucinations prior to last night. When discussing suicidal ideation, Pt denies having any active method, plan, or intent and identified passive wishes of being dead or gone to not have to experience her emotions. Pt endorses NSSIB of using a blade on her wrist 2 days ago and states it was a way for her to test how tough she is and denies it as a suicide attempt. Pt does endorse history of suicide, when she was 5 and 6 by cutting her  wrists and drinking . Pt denies any HI, VH. Pt does endorse daily alcohol consumption and reports she had 1-3 beers yesterday. At this time IP MH admission is not being recommended due to denial of SI, HI, AH/VH, and relief from previously reported symptoms. Pt was able to fully safety plan with Writer. Pt's current sx appear to be at Pt's baseline. Pt does appear to be at higher risk of death by suicide accidental or intentional due to mental health history and substance use. Pt appears to be able to use and motivated to engage in supportive mental health/ community resources. Pt denies any mental health or safety concerns at this time. Close follow-up with a psychiatrist and/or therapist was recommended. Pt declined services, stating having access to her own resources. Reports having a psychiatry appointment upcoming week. Pt is to return to the ED if any urgent or potentially life-threatening concerns arise. At the time of discharge, Pt's acute suicide risk was determined to be low due to the following factors: reduction in the intensity of mood/anxiety symptoms that preceded the admission, denial of suicidal thoughts, denies feeling helpless or hopeless, not currently under the influence of alcohol or illicit substances, denies experiencing command hallucinations. Protective factors include: social support, displays resiliency, future focused thinking, displays insight, and safe/stable housing. Pt identifies having firearms in the home yet they are secured stored in a gun safe.       Patient coping skills attempted to reduce the crisis:  Voluntarily presented with     Disposition  Recommended disposition: Individual Therapy, Medication Management        Reviewed case and recommendations with attending provider. Attending Name: Dr. Raymond       Attending concurs with disposition: yes       Patient and/or validated legal guardian concurs with disposition:   yes (Pt declined services, stating  having access to her own resources. Reports having a psychiatry appointment upcoming week.)       Final disposition:  discharge    Legal status on admission: Voluntary/Patient has signed consent for treatment    Assessment Details   Total duration spent with the patient: 33 min     CPT code(s) utilized: 73153 - Psychotherapy for Crisis - 60 (30-74*) min    Nagi Monroe [facility], Psychotherapist  DEC - Triage & Transition Services  Callback: [phone]

## 2024-09-15 NOTE — ED TRIAGE NOTES
"From home.  History of panic disorder.  Has not been sleeping for days.  Having auditory hallucinations that are \"negative\".  States command hallucinations but not elaborating of what the negative is saying.  Spouse endorses that this is a new development. Patient will not elaborate what the voices are saying but appears to be scared what is being said.      Triage Assessment (Adult)       Row Name 09/14/24 2006          Triage Assessment    Airway WDL WDL        Respiratory WDL    Respiratory WDL WDL        Skin Circulation/Temperature WDL    Skin Circulation/Temperature WDL WDL        Cardiac WDL    Cardiac WDL X  tachy        Peripheral/Neurovascular WDL    Peripheral Neurovascular WDL WDL        Cognitive/Neuro/Behavioral WDL    Cognitive/Neuro/Behavioral WDL WDL                     "

## 2024-09-17 ENCOUNTER — TELEPHONE (OUTPATIENT)
Dept: BEHAVIORAL HEALTH | Facility: CLINIC | Age: 49
End: 2024-09-17
Payer: COMMERCIAL

## 2024-09-18 ENCOUNTER — HOSPITAL ENCOUNTER (OUTPATIENT)
Facility: CLINIC | Age: 49
Setting detail: OBSERVATION
Discharge: HOME | End: 2024-09-19
Attending: EMERGENCY MEDICINE | Admitting: PSYCHIATRY & NEUROLOGY
Payer: COMMERCIAL

## 2024-09-18 DIAGNOSIS — F10.10 MILD ALCOHOL USE DISORDER: ICD-10-CM

## 2024-09-18 DIAGNOSIS — R44.0 AUDITORY HALLUCINATIONS: ICD-10-CM

## 2024-09-18 DIAGNOSIS — F29 PSYCHOSIS, UNSPECIFIED PSYCHOSIS TYPE (H): ICD-10-CM

## 2024-09-18 DIAGNOSIS — F41.9 ANXIETY: ICD-10-CM

## 2024-09-18 PROBLEM — F10.920 ALCOHOLIC INTOXICATION WITHOUT COMPLICATION (H): Status: ACTIVE | Noted: 2024-09-18

## 2024-09-18 PROBLEM — F23 BRIEF PSYCHOTIC DISORDER (H): Status: ACTIVE | Noted: 2024-09-18

## 2024-09-18 PROBLEM — F23 ACUTE PSYCHOSIS (H): Status: ACTIVE | Noted: 2024-09-18

## 2024-09-18 LAB
ALBUMIN SERPL BCG-MCNC: 4.5 G/DL (ref 3.5–5.2)
ALP SERPL-CCNC: 138 U/L (ref 40–150)
ALT SERPL W P-5'-P-CCNC: 23 U/L (ref 0–50)
ANION GAP SERPL CALCULATED.3IONS-SCNC: 16 MMOL/L (ref 7–15)
AST SERPL W P-5'-P-CCNC: 20 U/L (ref 0–45)
BASOPHILS # BLD AUTO: 0 10E3/UL (ref 0–0.2)
BASOPHILS NFR BLD AUTO: 1 %
BILIRUB SERPL-MCNC: 0.5 MG/DL
BUN SERPL-MCNC: 7.3 MG/DL (ref 6–20)
CALCIUM SERPL-MCNC: 9.6 MG/DL (ref 8.8–10.4)
CHLORIDE SERPL-SCNC: 101 MMOL/L (ref 98–107)
CREAT SERPL-MCNC: 0.84 MG/DL (ref 0.51–0.95)
EGFRCR SERPLBLD CKD-EPI 2021: 84 ML/MIN/1.73M2
EOSINOPHIL # BLD AUTO: 0.1 10E3/UL (ref 0–0.7)
EOSINOPHIL NFR BLD AUTO: 2 %
ERYTHROCYTE [DISTWIDTH] IN BLOOD BY AUTOMATED COUNT: 11.9 % (ref 10–15)
ETHANOL SERPL-MCNC: 0.08 G/DL
GLUCOSE SERPL-MCNC: 105 MG/DL (ref 70–99)
HCO3 SERPL-SCNC: 22 MMOL/L (ref 22–29)
HCT VFR BLD AUTO: 41.3 % (ref 35–47)
HGB BLD-MCNC: 13.7 G/DL (ref 11.7–15.7)
IMM GRANULOCYTES # BLD: 0 10E3/UL
IMM GRANULOCYTES NFR BLD: 0 %
LYMPHOCYTES # BLD AUTO: 1.1 10E3/UL (ref 0.8–5.3)
LYMPHOCYTES NFR BLD AUTO: 29 %
MCH RBC QN AUTO: 30.7 PG (ref 26.5–33)
MCHC RBC AUTO-ENTMCNC: 33.2 G/DL (ref 31.5–36.5)
MCV RBC AUTO: 93 FL (ref 78–100)
MONOCYTES # BLD AUTO: 0.3 10E3/UL (ref 0–1.3)
MONOCYTES NFR BLD AUTO: 8 %
NEUTROPHILS # BLD AUTO: 2.4 10E3/UL (ref 1.6–8.3)
NEUTROPHILS NFR BLD AUTO: 61 %
NRBC # BLD AUTO: 0 10E3/UL
NRBC BLD AUTO-RTO: 0 /100
PLATELET # BLD AUTO: 279 10E3/UL (ref 150–450)
POTASSIUM SERPL-SCNC: 4.3 MMOL/L (ref 3.4–5.3)
PROT SERPL-MCNC: 7.3 G/DL (ref 6.4–8.3)
RBC # BLD AUTO: 4.46 10E6/UL (ref 3.8–5.2)
SODIUM SERPL-SCNC: 139 MMOL/L (ref 135–145)
WBC # BLD AUTO: 4 10E3/UL (ref 4–11)

## 2024-09-18 PROCEDURE — 250N000013 HC RX MED GY IP 250 OP 250 PS 637: Performed by: EMERGENCY MEDICINE

## 2024-09-18 PROCEDURE — 250N000013 HC RX MED GY IP 250 OP 250 PS 637: Performed by: NURSE PRACTITIONER

## 2024-09-18 PROCEDURE — G0378 HOSPITAL OBSERVATION PER HR: HCPCS

## 2024-09-18 PROCEDURE — 250N000012 HC RX MED GY IP 250 OP 636 PS 637: Performed by: NURSE PRACTITIONER

## 2024-09-18 PROCEDURE — 82077 ASSAY SPEC XCP UR&BREATH IA: CPT | Performed by: EMERGENCY MEDICINE

## 2024-09-18 PROCEDURE — 36415 COLL VENOUS BLD VENIPUNCTURE: CPT | Performed by: EMERGENCY MEDICINE

## 2024-09-18 PROCEDURE — 99285 EMERGENCY DEPT VISIT HI MDM: CPT

## 2024-09-18 PROCEDURE — 82310 ASSAY OF CALCIUM: CPT | Performed by: EMERGENCY MEDICINE

## 2024-09-18 PROCEDURE — 85025 COMPLETE CBC W/AUTO DIFF WBC: CPT | Performed by: EMERGENCY MEDICINE

## 2024-09-18 PROCEDURE — 99223 1ST HOSP IP/OBS HIGH 75: CPT | Performed by: NURSE PRACTITIONER

## 2024-09-18 RX ORDER — DIAZEPAM 5 MG
10 TABLET ORAL EVERY 30 MIN PRN
Status: DISCONTINUED | OUTPATIENT
Start: 2024-09-18 | End: 2024-09-18

## 2024-09-18 RX ORDER — OLANZAPINE 10 MG/1
10 TABLET, ORALLY DISINTEGRATING ORAL ONCE
Status: COMPLETED | OUTPATIENT
Start: 2024-09-18 | End: 2024-09-18

## 2024-09-18 RX ORDER — OLANZAPINE 10 MG/1
10 TABLET ORAL AT BEDTIME
Status: DISCONTINUED | OUTPATIENT
Start: 2024-09-18 | End: 2024-09-18

## 2024-09-18 RX ORDER — MULTIPLE VITAMINS W/ MINERALS TAB 9MG-400MCG
1 TAB ORAL DAILY
Status: DISCONTINUED | OUTPATIENT
Start: 2024-09-19 | End: 2024-09-19 | Stop reason: HOSPADM

## 2024-09-18 RX ORDER — LEVOTHYROXINE SODIUM 75 UG/1
150 TABLET ORAL
Status: DISCONTINUED | OUTPATIENT
Start: 2024-09-19 | End: 2024-09-19 | Stop reason: HOSPADM

## 2024-09-18 RX ORDER — LORAZEPAM 1 MG/1
1 TABLET ORAL EVERY 6 HOURS PRN
Status: DISCONTINUED | OUTPATIENT
Start: 2024-09-18 | End: 2024-09-19 | Stop reason: HOSPADM

## 2024-09-18 RX ORDER — OLANZAPINE 10 MG/2ML
10 INJECTION, POWDER, FOR SOLUTION INTRAMUSCULAR ONCE
Status: COMPLETED | OUTPATIENT
Start: 2024-09-18 | End: 2024-09-18

## 2024-09-18 RX ORDER — BUPRENORPHINE AND NALOXONE 8; 2 MG/1; MG/1
1 FILM, SOLUBLE BUCCAL; SUBLINGUAL 2 TIMES DAILY
Status: DISCONTINUED | OUTPATIENT
Start: 2024-09-18 | End: 2024-09-19 | Stop reason: HOSPADM

## 2024-09-18 RX ORDER — OLANZAPINE 10 MG/1
10 TABLET ORAL AT BEDTIME
Status: DISCONTINUED | OUTPATIENT
Start: 2024-09-18 | End: 2024-09-19 | Stop reason: HOSPADM

## 2024-09-18 RX ORDER — DIAZEPAM 10 MG/2ML
5-10 INJECTION, SOLUTION INTRAMUSCULAR; INTRAVENOUS EVERY 30 MIN PRN
Status: DISCONTINUED | OUTPATIENT
Start: 2024-09-18 | End: 2024-09-18

## 2024-09-18 RX ORDER — PROGESTERONE 100 MG/1
200 CAPSULE ORAL AT BEDTIME
Status: DISCONTINUED | OUTPATIENT
Start: 2024-09-18 | End: 2024-09-19 | Stop reason: HOSPADM

## 2024-09-18 RX ORDER — FLUMAZENIL 0.1 MG/ML
0.2 INJECTION, SOLUTION INTRAVENOUS
Status: DISCONTINUED | OUTPATIENT
Start: 2024-09-18 | End: 2024-09-18

## 2024-09-18 RX ORDER — LORAZEPAM 1 MG/1
1 TABLET ORAL ONCE
Status: COMPLETED | OUTPATIENT
Start: 2024-09-18 | End: 2024-09-18

## 2024-09-18 RX ORDER — HALOPERIDOL 5 MG/ML
2.5-5 INJECTION INTRAMUSCULAR EVERY 6 HOURS PRN
Status: DISCONTINUED | OUTPATIENT
Start: 2024-09-18 | End: 2024-09-18

## 2024-09-18 RX ORDER — OLANZAPINE 5 MG/1
5-10 TABLET, ORALLY DISINTEGRATING ORAL EVERY 6 HOURS PRN
Status: DISCONTINUED | OUTPATIENT
Start: 2024-09-18 | End: 2024-09-18

## 2024-09-18 RX ORDER — HALOPERIDOL 5 MG/1
5 TABLET ORAL ONCE
Status: COMPLETED | OUTPATIENT
Start: 2024-09-18 | End: 2024-09-18

## 2024-09-18 RX ORDER — CLONAZEPAM 0.5 MG/1
0.5 TABLET ORAL ONCE
Status: COMPLETED | OUTPATIENT
Start: 2024-09-18 | End: 2024-09-18

## 2024-09-18 RX ORDER — HALOPERIDOL 5 MG/1
5 TABLET ORAL EVERY 6 HOURS PRN
Status: DISCONTINUED | OUTPATIENT
Start: 2024-09-18 | End: 2024-09-19 | Stop reason: HOSPADM

## 2024-09-18 RX ADMIN — BUPRENORPHINE AND NALOXONE 1 FILM: 8; 2 FILM BUCCAL; SUBLINGUAL at 21:15

## 2024-09-18 RX ADMIN — PROGESTERONE 200 MG: 100 CAPSULE ORAL at 21:10

## 2024-09-18 RX ADMIN — OLANZAPINE 10 MG: 10 TABLET, FILM COATED ORAL at 19:00

## 2024-09-18 RX ADMIN — HALOPERIDOL 5 MG: 5 TABLET ORAL at 20:20

## 2024-09-18 RX ADMIN — CLONAZEPAM 0.5 MG: 0.5 TABLET ORAL at 15:03

## 2024-09-18 RX ADMIN — OLANZAPINE 10 MG: 10 TABLET, ORALLY DISINTEGRATING ORAL at 07:00

## 2024-09-18 RX ADMIN — LORAZEPAM 1 MG: 1 TABLET ORAL at 20:20

## 2024-09-18 ASSESSMENT — ACTIVITIES OF DAILY LIVING (ADL)
ADLS_ACUITY_SCORE: 37

## 2024-09-18 ASSESSMENT — LIFESTYLE VARIABLES
HEADACHE, FULLNESS IN HEAD: NOT PRESENT
TREMOR: NO TREMOR
NAUSEA AND VOMITING: NO NAUSEA AND NO VOMITING
PAROXYSMAL SWEATS: BARELY PERCEPTIBLE SWEATING, PALMS MOIST
TOTAL SCORE: 7
VISUAL DISTURBANCES: NOT PRESENT
ORIENTATION AND CLOUDING OF SENSORIUM: ORIENTED AND CAN DO SERIAL ADDITIONS
ANXIETY: MODERATELY ANXIOUS, OR GUARDED, SO ANXIETY IS INFERRED
AGITATION: NORMAL ACTIVITY
AUDITORY DISTURBANCES: MILD HARSHNESS OR ABILITY TO FRIGHTEN

## 2024-09-18 NOTE — ED NOTES
Per previous RN Zeferino, pt spit out attempted oral zyprexa, tried to exit room multiple times ( and staff stopped her). Pt also immediately ripped IV out after placing.

## 2024-09-18 NOTE — PROGRESS NOTES
"49 year old female with history of anxiety, depression, and alcohol use disorder received from ED due to hallucinations and disorganization. Pt was seen in ED for anxiety, insomnia, and AH on 9/15/24 and discharged home, however, was unable to sleep and symptoms worsened. Pt reports insomnia over the last 5 days. Endorsing auditory hallucinations but unable to tell writer what she is hearing. Pt reports long history of panic attacks but now experiencing them more frequently and more intensely. Pt has history of self-harm via cutting, reports she has not done this in 1 week and denies current urges. Contracts for safety on unit. Pt drinks \"a few\" beers daily. Denies illicit drug use.     Pt disorganized but cooperative and redirectable. Pt confused at times, reports she has no family and needs to get home to care for her cats though then recalls that she has a  and son at home. Pt needs frequent reminders about plan of care.        Nursing and risk assessments completed. Assessments reviewed with LMHP and physician. Admission information reviewed with patient. Patient given a tour of [facility] and instructions on using the facility. Questions regarding [facility] addressed. Pt safety search completed.     "

## 2024-09-18 NOTE — ED PROVIDER NOTES
Emergency Department Note      History of Present Illness     Chief Complaint   Hallucinations      HPI   Ambar Borges is a 49 year old female with history of thyroid cancer, depression, anxiety, insomnia, and alcohol abuse who presents for psychiatric evaluation. The patient's  reports that she was here at [facility] last weekend and was discharged by the end feeling much improved, but 2 days ago she began having worsening auditory hallucinations regarding self harm that  states are getting louder and more personal.  states that the patient was having difficulty getting in the car to go to the ED today, as she is very anxious this morning. He notes that the patient has struggled with anxiety, depression, and alcohol abuse for the last few years but has been doing recently. She has never been admitted for psychiatric reasons, and her hallucinations are new over the last few weeks with no history of schizophrenia or psychosis. The patient did have a few beers last night. She complains of nausea but has no other medical complains. The patient denies vomiting, fever, diarrhea, headache, abdominal pain, visual hallucinations or suicidal/homicidal ideation.  adds that the patient takes levothyroxine for hypothyroidism, as her thyroid is removed due to cancer. She also takes Klonopin and Suboxone. The patient denies overdosing on these medications or any street drug use.    Independent Historian    as detailed above.    Review of External Notes   N/A    Past Medical History     Medical History and Problem List   Chronic low back pain  Primary thyroid cancer   Hypothyroidism  Hirsutism  Oligomenorrhea   Arthritis  Asthma  Chronic alcoholic liver disease  Fibromyalgia  Lichen sclerosus  MDD   Opioid dependence   Panic disorder  Spinal stenosis  Testosterone deficiency   Anxiety   DDD, lumbar   Insomnia      Medications   Suboxone   Hydrocodone-acetaminophen  Synthroid   Methadone  "HCL  Progesterone  Zoloft  Zanaflex  Ultram   Klonopin      Surgical History   Hemorrhoidectomy, external, single column/group  Septoplasty   Tooth extraction   Mammoplasty augmentation  Total thyroidectomy and selective neck dissection  Dilation and curettage       Physical Exam     Patient Vitals for the past 24 hrs:   BP Temp Temp src Pulse Resp SpO2 Height Weight   09/18/24 1002 133/79 98.1  F (36.7  C) Oral 91 16 98 % -- --   09/18/24 0700 -- -- -- -- 20 98 % 1.651 m (5' 5\") 62.1 kg (137 lb)     Physical Exam  General: Alert and cooperative with exam. Patient in mild distress.  Patient demonstrates evidence of psychosis and appears paranoid.  Mildly intoxicated  Head:  Scalp is NC/AT  Eyes:  No scleral icterus, PERRL  ENT:  The external nose and ears are normal. The oropharynx is normal and without erythema; mucus membranes are moist. Uvula midline, no evidence of deep space infection.  Neck:  Normal range of motion without rigidity.  CV:  Regular rate and rhythm    No pathologic murmur   Resp:  Breath sounds are clear bilaterally    Non-labored, no retractions or accessory muscle use  GI:  Abdomen is soft, no distension, no tenderness. No peritoneal signs  MS:  No lower extremity edema   Skin:  Warm and dry, No rash or lesions noted.  Neuro: Oriented x 3. No gross motor deficits.  Psych: Endorses anxiety, depression, alcohol abuse as well as close auditory hallucinations.  Denies suicidal ideation      Diagnostics     Lab Results   Labs Ordered and Resulted from Time of ED Arrival to Time of ED Departure   COMPREHENSIVE METABOLIC PANEL - Abnormal       Result Value    Sodium 139      Potassium 4.3      Carbon Dioxide (CO2) 22      Anion Gap 16 (*)     Urea Nitrogen 7.3      Creatinine 0.84      GFR Estimate 84      Calcium 9.6      Chloride 101      Glucose 105 (*)     Alkaline Phosphatase 138      AST 20      ALT 23      Protein Total 7.3      Albumin 4.5      Bilirubin Total 0.5     ETHYL ALCOHOL LEVEL - " Abnormal    Alcohol ethyl 0.08 (*)    CBC WITH PLATELETS AND DIFFERENTIAL    WBC Count 4.0      RBC Count 4.46      Hemoglobin 13.7      Hematocrit 41.3      MCV 93      MCH 30.7      MCHC 33.2      RDW 11.9      Platelet Count 279      % Neutrophils 61      % Lymphocytes 29      % Monocytes 8      % Eosinophils 2      % Basophils 1      % Immature Granulocytes 0      NRBCs per 100 WBC 0      Absolute Neutrophils 2.4      Absolute Lymphocytes 1.1      Absolute Monocytes 0.3      Absolute Eosinophils 0.1      Absolute Basophils 0.0      Absolute Immature Granulocytes 0.0      Absolute NRBCs 0.0         Imaging   No orders to display       Independent Interpretation   None    ED Course      Medications Administered   Medications   OLANZapine zydis (zyPREXA) ODT tab 10 mg (10 mg Oral $Given 9/18/24 0700)   OLANZapine (zyPREXA) injection 10 mg (10 mg Intramuscular Not Given 9/18/24 0853)         Discussion of Management   None    ED Course   ED Course as of 09/18/24 1249   Wed Sep 18, 2024   0634 I obtained history and examined the patient as noted above       Additional Documentation  Stress/Adjustment Disorders    Medical Decision Making / Diagnosis     CMS Diagnoses: None    MIPS       None    MDM   Ambar Borges is a 49 year old female presents with report of auditory hallucinations and depression/anxiety.  On evaluation patient is demonstrating evidence of acute psychosis.  Labs obtained and notable for mildly elevated alcohol level (0.08; no evidence of withdrawal at this time).  She was provided ODT Zyprexa with some improvement on reassessment.  Evaluated by DEC and felt appropriate for transfer to [facility] unit; I agree with this assessment.  Medically clear for transfer to [facility].  Placed on 72-hour hold.    Disposition   The patient was transferred to [facility].     Diagnosis     ICD-10-CM    1. Acute psychosis (H)  F23       2. Alcoholic intoxication without complication (H24)  F10.920       3. Auditory  hallucinations  R44.0              Scribe Disclosure:  I, Antony Guido, am serving as a scribe at 6:37 AM on 9/18/2024 to document services personally performed by Dev Mora DO based on my observations and the provider's statements to me.        Dev Mora DO  09/18/24 3840

## 2024-09-18 NOTE — DISCHARGE INSTRUCTIONS
Scheduled Appointment(s):  [facility] - Scheduled Appointment  You have been scheduled a telephone appointment with the [facility]. As a reminder, this is not an in-person appointment. A Navigator will contact you at your personal telephone number on 9/20/2024 at 10:30 AM. You can expect a 15-30 minute appointment. You will discuss programming options and be assisted in next steps. If you have any further questions or concerns, please contact the [facility] at [phone]. Note: You will not be charged for this telephone appointment.    Medication Mgmt - Initial (In-Person)  Date: Monday, 9/23/2024  Time: 10:00 am - 11:00 am  Provider: Aide Abarca  MSN  CNP,RN  Location: [place] [address]  Phone: [phone]  Patient Instructions: All intake paperwork will be completed electronically. 2. Appointment will be confirmed after all intake paperwork is completed. 3. Book an appointment at [email or web address] or [email or web address] ([email or web address] [email or web address]. Location is close to the main road and bus route. Free parking Bring Insurance and discharge paperwork The office is on the 3rd floor Arrive 10-15 minutes for insurance verification.      Mental health recommendations    Please follow-up with your outpatient team about your visit today.    2.  One of our care coordinators will reach out to you after your discharge.  If you have any questions about additional resources please call our coordinators at # [phone]    3.  Please use aftercare plan and already established coping skills and safety planning as needed.     4.  Please call 911 and/or return to the emergency department if her symptoms worsen or your safety becomes compromised.       Partial Hospitalization (PHP) Programs/Resources:    [facility]  Phone: [phone]  Location(s): [address] [place] [address]  Website:  [email or web address]/psychiatry/partial-hospital-program/    [facility]  Phone: [phone]) [phone]  Location(s): Several Locations  Website: [email or web address]://[email or web address]/[email or web address]    [facility]  Phone: [phone]) [phone]  Location(s): [facility], [facility] [place]  Website: [email or web address]/partial-hospital/    [facility]  Phone: [phone]  Location(s): [facility]  Website: https://[employer].org/what-we-treat/mood-disorders/mood-disorder-php-iop       Aftercare Plan    If I am feeling unsafe or I am in a crisis, I will:   Contact my established care providers   Call the National Suicide Prevention Lifeline: 769.876.4152   Go to the nearest emergency room   Call 911   Your [facility] has a mental health crisis team you can call 24/7: [facility], [phone]    Things I am able to do on my own to cope or help me feel better:   -Practice square breathing when I begin to feel anxious - in breath through the nose for the count   of 4 and the first line on the square. Out breath through the mouth for the count of 4 for the second line   of the square. Repeat to complete the square. Repeat the square as many times as needed.    Things that I am able to do with others to cope or help me feel better: -Use community resources, including hotline numbers, [facility] crisis and support meetings    Things I can use or do for distraction: -Distraction skills of: going for walks, watching TV, spending time outside, calling a friend or family member  -Download a meditation brianna and spend 15-20 minutes per day mediating/relaxing. Some apps to   download include: Calm, Headspace and Insight Timer. All 3 of these apps have free version    Changes I can make to support my mental health and wellness:   -Attend scheduled mental health therapy and psychiatric appointments and follow all recommendations  -Maintain a daily  "schedule/routine  -Practice deep breathing skills  -Abstain from all mood altering chemicals not currently prescribed to me     People in my life that I can ask for help: [place] on Mental Illness (YANE)  263.687.6979 or 1.888.YANE.HELPS    Other things that are important when I m in crisis: -Commit to 30 minutes of self care daily - this can be as simple as taking a shower, going for a walk, cooking a meal, read, writing, etc        Crisis Lines  Crisis Text Line  Text 553669  You will be connected with a trained live crisis counselor to provide support.    Por espanol, texto  TAE a 584483 o texto a 442-AYUDAME en WhatsApp    National [place] Line  1.800.SUICIDE [5526073]      Community Resources  Fast Tracker  Linking people to mental health and substance use disorder resources  [employer].org     [place] Mental Health Warm Line  Peer to peer support  Monday thru Saturday, 12 pm to 10 pm  893.328.6614 or 8.970.807.9876  Text \"Support\" to 83254    [employer] [place] on Mental Illness (YANE)  705.603.5712 or 1.888.YANE.HELPS        Mental Health Apps  My3  https://myParkAroundpp.org/    VirtualHopeBox  https://[employer].org/apps/virtual-hope-box/      Additional Information  Today you were seen by a licensed mental health professional through Triage and Transition services, [facility] ([facility])  for a crisis assessment in the Emergency Department at [facility].  It is recommended that you follow up with your established providers (psychiatrist, mental health therapist, and/or primary care doctor - as relevant) as soon as possible. Coordinators from [facility] will be calling you in the next 24-48 hours to ensure that you have the resources you need.  You can also contact [facility] coordinators directly at [phone]. You may have been scheduled for or offered an appointment with a mental health provider. [facility] maintains an extensive network of licensed behavioral health providers to " connect patients with the services they need.  We do not charge providers a fee to participate in our referral network.  We match patients with providers based on a patient's specific needs, insurance coverage, and location.  Our first effort will be to refer you to a provider within your care system, and will utilize providers outside your care system as needed.

## 2024-09-18 NOTE — PROGRESS NOTES
RN attempted to talk with patient. Patient asked RN to come back in 30 minutes. Patient appears preoccupied. Observed staring into space. Observed plugging her ears at times. Given 72 HH rights paperwork, and provided with sample cup to obtain urine sample for UTI. Patient verbalized understanding.

## 2024-09-18 NOTE — ED PROVIDER NOTES
[facility] Unit - Initial Psychiatric Observation Note  [facility] Emergency Department  Observation Initiation Date: Sep 18, 2024    Ambar Borges MRN: [id number]   Age: 49 year old YOB: 1975     History     Chief Complaint   Patient presents with    Hallucinations     HPI  Ambar Borges is a 49 year old female with a past history notable for history of thyroid cancer s/p thyroidectomy 10 years ago, depression, anxiety, opioid dependence on maintenance therapy.  Patient presented to the emergency department early this morning for evaluation of symptoms of psychosis involving auditory hallucinations and delusional thinking.  Patient was medically evaluated and determined to be medically stable for transfer to [facility] for further psychiatric assessment.  EtOH level in the ED was 0.08.  Of note, patient was seen in the ED on 9/14 with similar symptoms.  She was given olanzapine, Haldol, and lorazepam and reportedly slept and discharged the following day.  She returns to the ED with reemergence of thought disorganization and psychosis symptoms.    On interview with patient, she reports that she presented to the emergency department for evaluation of increased stress and anxiety.  She notes that she has felt more anxious over the last several days.  Attempted to discuss concerns from  that she has been experiencing auditory hallucinations and delusional thinking, and patient denies these concerns.  Says she has only been experiencing increased anxiety and stress recently.  She reports she drinks about 2 beers daily, and denies any excessive alcohol use recently.  Reports some difficulty sleeping recently, but denies it has been problematic.  She does not feel as though there have been any changes to her thoughts or ability to function recently.  Discussed with her that her  has been rather concerned about her, and patient insists that she would just like to return home.  Says that she feels  "rather anxious here and needs to get home.  She denies any thoughts to harm herself or others.  Denies any auditory or visual hallucinations.  Denies feeling as though there is anyone out to get her or wanting to harm her.  She was encouraged to reach out to her .  After interview, patient asked to speak with writer again, and informed this writer that her  packed up all of his stuff this morning and is leaving her.    Writer called and spoke with  following interview with patient.   reports that for the past 2 weeks, patient has been increasingly stressed and anxious.  Reports that by the middle of last week, she began to experience a \"racing mind\" and seemed to be hearing voices.  She was talking about telepathy and thinking that her family members were saying horrible things about her.  She seemed to lose distinction between reality and her psychosis.  She had not been sleeping real well recently.  On Saturday, she did agree to go into the emergency department.  She received some medications both IV and oral, and seemed to sleep quite well.  On Sunday, she was back to her usual self.  On Monday,  notes she had a rough day.  She maybe slept for 3 to 4 hours at night.  Tuesday, she seemed to be doing a bit better.  Last night, she woke  up during the middle of the night and began rambling, seeming to lose grasp of reality once again.  It was very difficult to get her back to the emergency department, and she did attempt to open the car door several times on the way to the emergency department.  Mason notes that patient has not had any problematic alcohol use recently, but may have been drinking a bit more over the last several days.  Seemed to notice a change after she returned home from [place] a few weeks ago.        Past Medical History  Past Medical History:   Diagnosis Date    Chronic low back pain     Primary thyroid cancer (H)      Past Surgical History:   Procedure " "Laterality Date    HC HEMORROIDECTOMY, EXTERNAL, SINGLE COLUMN/GROUP  2006    HC REPAIR OF NASAL SEPTUM  2008    HC TOOTH EXTRACTION W/FORCEP  2010    nerve damage to sensation of lower jaw    MAMMOPLASTY AUGMENTATION  2004    THYROIDECTOMY  6/1/2012    Procedure:THYROIDECTOMY; Total Thyroidectomy and Selective Neck Dissection; Surgeon:KELSEY DONOVAN; Location:[facility] OR     B Complex Vitamins (VITAMIN  B COMPLEX) tablet  buprenorphine HCl-naloxone HCl (SUBOXONE) 8-2 MG per film  clonazePAM (KLONOPIN) 0.5 MG tablet  levothyroxine (SYNTHROID, LEVOTHROID) 150 MCG tablet  progesterone (PROMETRIUM) 100 MG capsule  Turmeric (QC TUMERIC COMPLEX PO)  ibuprofen (ADVIL) 200 MG capsule      No Known Allergies  Family History  No family history on file.  Social History   Social History     Substance Use Topics    Alcohol use: Yes     Comment: Infrequently    Drug use: No          Review of Systems  A medically appropriate review of systems was performed with pertinent positives and negatives noted in the HPI, and all other systems negative.    Physical Examination   BP: 133/79  Pulse: 91  Temp: 98.1  F (36.7  C)  Resp: 20  Height: 165.1 cm (5' 5\")  Weight: 62.1 kg (137 lb)  SpO2: 98 %    Physical Exam  General: Appears stated age.   Neuro: Alert and fully oriented. Extremities appear to demonstrate normal strength on visual inspection.   Integumentary/Skin: no rash visualized, normal color    Psychiatric Examination   Appearance: awake, alert, adequately groomed, appeared as age stated, and casually dressed  Attitude:  cooperative  Eye Contact:  fair  Mood:  anxious  Affect:  mood congruent and intensity is blunted  Speech:  clear, coherent and normal prosody  Psychomotor Behavior:  no evidence of tardive dyskinesia, dystonia, or tics and appears to have mild bilateral upper extremity tremor  Thought Process:  illogical  Associations:  no loose associations  Thought Content:   denies suicidal or homicidal thinking. Denies auditory " or visual hallucinations. Demonstrating delusional thinking.   Insight:  limited  Judgement:  fair  Oriented to:   self, date, and place  Attention Span and Concentration:  fair  Recent and Remote Memory:  fair  Language: able to name/identify objects without impairment  Fund of Knowledge: intact with awareness of current and past events    ED Course     ED Course as of 09/18/24 1744   Wed Sep 18, 2024   0649 I obtained history and examined the patient as noted above       Labs Ordered and Resulted from Time of ED Arrival to Time of ED Departure   COMPREHENSIVE METABOLIC PANEL - Abnormal       Result Value    Sodium 139      Potassium 4.3      Carbon Dioxide (CO2) 22      Anion Gap 16 (*)     Urea Nitrogen 7.3      Creatinine 0.84      GFR Estimate 84      Calcium 9.6      Chloride 101      Glucose 105 (*)     Alkaline Phosphatase 138      AST 20      ALT 23      Protein Total 7.3      Albumin 4.5      Bilirubin Total 0.5     ETHYL ALCOHOL LEVEL - Abnormal    Alcohol ethyl 0.08 (*)    CBC WITH PLATELETS AND DIFFERENTIAL    WBC Count 4.0      RBC Count 4.46      Hemoglobin 13.7      Hematocrit 41.3      MCV 93      MCH 30.7      MCHC 33.2      RDW 11.9      Platelet Count 279      % Neutrophils 61      % Lymphocytes 29      % Monocytes 8      % Eosinophils 2      % Basophils 1      % Immature Granulocytes 0      NRBCs per 100 WBC 0      Absolute Neutrophils 2.4      Absolute Lymphocytes 1.1      Absolute Monocytes 0.3      Absolute Eosinophils 0.1      Absolute Basophils 0.0      Absolute Immature Granulocytes 0.0      Absolute NRBCs 0.0     ROUTINE UA WITH MICROSCOPIC       Assessments & Plan (with Medical Decision Making)   Patient presenting with new-onset paranoia, disorganized thinking, and possible auditory hallucinations. Per , these symptoms began around mid-last week. Improved with olanzapine and haldol in the ED on 9/14, but decompensated in the last couple of days. Pt was placed on a hold in the ED,  will continue for now given lack of insight. Start olanzapine this evening. Nursing notes reviewed noting no acute issues.     I have reviewed the assessment completed by the [facility].     During the observation period, the patient did not require medications for agitation, and did not require restraints/seclusion for patient and/or provider safety.     The patient was found to have a psychiatric condition that would benefit from an observation stay in the emergency department for further psychiatric stabilization and/or coordination of a safe disposition. The observation plan includes serial assessments of psychiatric condition, potential administration of medications if indicated, further disposition pending the patient's psychiatric course during the monitoring period.     Preliminary diagnosis:    ICD-10-CM    1. Auditory hallucinations  R44.0       2. Mild alcohol use disorder  F10.10       3. Psychosis, unspecified psychosis type (H)  F29    R/o delirium   4. Anxiety  F41.9            Treatment Plan:  - Will continue 72 hour hold at this time given lack of insight and confusion  - Will start olanzapine 10 mg at bedtime for treatment of psychosis symptoms  - Will monitor for symptoms of alcohol withdrawal with CIWA and symptom-triggered diazepam. Per , pt has not been drinking much recently, but maybe more in the last several days.   - Will check a urinalysis to rule out UTI. Otherwise, CBC appears wnl, CMP wnl. EtOH level was 0.08 on arrival this morning. Utox positive for cannabinoids and benzodiazepines on 9/15.   - Ceres to observation, reassess tomorrow      --  Carlitos Frohreich, CNP   [facility] EMERGENCY DEPT  EmPATH Unit     Carlitos Stanford CNP  09/18/24 2585

## 2024-09-18 NOTE — CONSULTS
Diagnostic Evaluation Consultation  Crisis Assessment    Patient Name: Ambar Borges  Age:  49 year old  Legal Sex: female  Gender Identity: female  Pronouns: she/her   Race: White  Ethnicity: Not  or   Language: English      Patient was assessed: In person   Crisis Assessment Start Date: 09/18/24  Crisis Assessment Start Time: 1130  Crisis Assessment Stop Time: 1200  Patient location: [facility] EMERGENCY DEPT                             [id number]    Referral Data and Chief Complaint  Ambar Borges presents to the ED with family/friends. Patient is presenting to the ED for the following concerns: Significant behavioral change, Anxiety, Substance use, Paranoia.   Factors that make the mental health crisis life threatening or complex are:  Pt presented to the ED due to concerns of psychosis sx and disorganized behavior. Pt was seen on 9/15 in the ED and was discharged home, Pts sx continued to persist and Pt has not been sleeping and having increased hallucinations and paranoid ideations. While Pts  was driving Pt to the ED Pt attempted to jump out of the car multiple times.      Informed Consent and Assessment Methods  Explained the crisis assessment process, including applicable information disclosures and limits to confidentiality, assessed understanding of the process, and obtained consent to proceed with the assessment.  Assessment methods included conducting a formal interview with patient, review of medical records, collaboration with medical staff, and obtaining relevant collateral information from family and community providers when available.  : done     Patient response to interventions: acceptance expressed, verbalizes understanding  Coping skills were attempted to reduce the crisis:  Pt appears to be help seeking.     History of the Crisis   Pt presented  with a distressed and somewhat blunted affect. Pts mood is congruent with this presentation. Pt was oriented  "x4. Pt was cooperative during assessment but did appear to be internally preoccupied and easily distracted during assessment. Pt was easily redirectable. Pt does appear to be a poor historian and Pt needed several prompts to answer assessment questions. Pt exhibited thought blocking and Pt endorsed feeling \"confused, I am not being rational.\" Pt has a psychiatric hx of anxiety, depression and alcohol use. Pt does not have a hx of past IP MH admission. Pt currently has no outpatient providers but was in the process to establish care in the community. During assessment Pt denied any SI but endorsed a hx of passive SI. During assessment today Pt denied any SIB but per assessment on 9/15 Pt endorsed SIB via cutting. Pt denied any HI and has no hx of aggressive or violent behavior. Pt endorsed a hx of daily alcohol use about \"1-3 beers a day\" and denied any other substance use. Pt endorsed lack of sleep and distressing AH - \"voices\" Pt was not able to articulate the nature of her AH, Pt endorsed \"other people can hear them too.\"  Per collateral Pt has been having increased hallucinations and making delusional statements about \"reading peoples minds.\" Per collateral has not been sleeping and not eating. Pt has been caring for her ADL's.    Brief Psychosocial History  Family:  , Children yes  Support System:    Employment Status:     Source of Income:  none  Financial Environmental Concerns:  none  Current Hobbies:  gardening, television/movies/videos, social media/computer activities  Barriers in Personal Life:  mental health concerns    Significant Clinical History  Current Anxiety Symptoms:  panic attack, shortness of breath or racing heart, anxious  Current Depression/Trauma:  withdrawl/isolation, negativistic, irritable  Current Somatic Symptoms:  sweating, flushing, shaking, somatic symptoms (abdominal pain, headache, tension), anxious, shortness of breath or racing heart  Current Psychosis/Thought " Disturbance:  forgetful, distractability, auditory hallucinations  Current Eating Symptoms:  loss of appetite  Chemical Use History:  Alcohol: Daily  Last Use:: 09/18/24  Benzodiazepines: None  Opiates: None  Cocaine: None  Marijuana: None  Other Use: None   Past diagnosis:  Anxiety Disorder, Depression, Substance Use Disorder  Family history:  Schizophrenia  Past treatment:  Individual therapy, Psychiatric Medication Management, Primary Care  Details of most recent treatment:  Pt has no outpatient providers  Other relevant history:          Collateral Information  Is there collateral information: Yes     Collateral information name, relationship, phone number:  Mason (Spouse)  [phone] (Mobile)    What happened today: Writer spoke with Pts , Mason. He endorsed that Pt was seen in the ED on Saturday and was better on Sunday but started to decline and Pt was not able to function at home and he brought Pt to the ED, Pt attempted to get out of the car a few times and she has been able to function at home.     What is different about patient's functioning: Pt has a long hx of anxiety and panic attacks. Pt also drinks on a daily basis, Pt recently started to have AH and they have been really distressing to Pt. Pt has not been sleeping and has been paranoid that Mason will leave her and Pt has been making delusional statements about reading minds are very confused. Mason started to notice a decline in Pts functioning about a week ago. Pt does not take medications and she does not have any outpatient mental health providers but Mason has been trying to get appointments for Pt. Pt has never been hospitalized. Pt has not endorsed SI, SIB or HI. Pt has endorsed hearing voices. Pt has several medical concerns, issues with her thyroid and hx of cancer.     Concern about alcohol/drug use:  Daily alcohol use     What do you think the patient needs:  Mason was not sure IP MH would be most effective but Pt does need sleep and  possible medications with follow up.     Has patient made comments about wanting to kill themselves/others: no    If d/c is recommended, can they take part in safety/aftercare planning:  yes    Additional collateral information:  n/a     Risk Assessment  [place] Suicide Severity Rating Scale Full Clinical Version:             [place] Suicide Severity Rating Scale Recent:   Suicidal Ideation (Recent)  Q1 Wished to be Dead (Past Month): yes  Q2 Suicidal Thoughts (Past Month): yes  Q3 Suicidal Thought Method: no  Q4 Suicidal Intent without Specific Plan: no  Q5 Suicide Intent with Specific Plan: no  Level of Risk per Screen: low risk  Intensity of Ideation (Recent)  Most Severe Ideation Rating (Past 1 Month): 1  Frequency (Past 1 Month): Less than once a week  Duration (Past 1 Month): Fleeting, few seconds or minutes  Controllability (Past 1 Month): Can control thoughts with little difficulty  Deterrents (Past 1 Month): Deterrents definitely stopped you from attempting suicide  Reasons for Ideation (Past 1 Month): Mostly to end or stop the pain (You couldn't go on living with the pain or how you were feeling)  Suicidal Behavior (Recent)  Actual Attempt (Past 3 Months): No  Has subject engaged in non-suicidal self-injurious behavior? (Past 3 Months): No  Interrupted Attempts (Past 3 Months): No  Aborted or Self-Interrupted Attempt (Past 3 Months): No  Preparatory Acts or Behavior (Past 3 Months): No    Environmental or Psychosocial Events: unemployment/underemployment, ongoing abuse of substances, other life stressors  Protective Factors: Protective Factors: strong bond to family unit, community support, or employment, intact marriage or domestic partnership, lives in a responsibly safe and stable environment, responsibilities and duties to others, including pets and children, sense of importance of health and wellness, help seeking, supportive ongoing medical and mental health care relationships, optimistic outlook -  identification of future goals    Does the patient have thoughts of harming others? Feels Like Hurting Others: no  Previous Attempt to Hurt Others: no  Is the patient engaging in sexually inappropriate behavior?: no    Is the patient engaging in sexually inappropriate behavior?  no        Mental Status Exam   Affect: Flat  Appearance: Disheveled  Attention Span/Concentration: Attentive  Eye Contact: Variable    Fund of Knowledge: Delayed   Language /Speech Content: Fluent  Language /Speech Volume: Soft  Language /Speech Rate/Productions: Minimally Responsive, Slow  Recent Memory: Poor  Remote Memory: Poor  Mood: Anxious, Depressed  Orientation to Person: Yes   Orientation to Place: Yes  Orientation to Time of Day: Yes  Orientation to Date: Yes     Situation (Do they understand why they are here?): Yes  Psychomotor Behavior: Normal  Thought Content: Hallucinations, Paranoia  Thought Form: Paranoia, Obsessive/Perseverative       Medication  Psychotropic medications:   Medication Orders - Psychiatric (From admission, onward)      None             Current Care Team  Patient Care Team:  No Ref-Primary, Physician as PCP - General    Diagnosis  Patient Active Problem List   Diagnosis Code    Malignant neoplasm of thyroid gland (H) C73    Postsurgical hypothyroidism E89.0    Hirsutism L68.0    Oligomenorrhea N91.5    Anxiety F41.9    Major depression F32.9    Brief psychotic disorder (H) F23       Primary Problem This Admission  Active Hospital Problems    Brief psychotic disorder (H)        Clinical Summary and Substantiation of Recommendations   Pt is a 48 y/o female with a psychiatric hx of anxiety, depression and alcohol use. At this time it does appear that Pt would benefit from further observation and psychiatric stabilization via medication management and ED level therapeutic interventions, due to acute onset of psychosis sx and disorganized behavior.  Pt was not able to fully safety plan with writer. Pt does appear  to be at higher risk of death by suicide accidental or intentional due to mental health hx and substance use hx. If Pt is able to effectively safety plan and/or Pts sx improve it would be beneficial to pursue a less restrictive alternative. Per MD recommendation Pt was placed on a 72 hour hold on 9/18/24 at 1228. Per writer recommendation Pts care path will remain observation and goal is for Pt to be able to transfer to [facility] for further evaluation/stabilization. Pt was not placed on the [facility] worklist due to hopeful mitigation of current psychosis sx with medications and appropriate ED interventions. Writer also recommends possible [facility] referral and psychiatry follow up.       Patient coping skills attempted to reduce the crisis:  Pt appears to be help seeking.    Disposition  Recommended disposition: Individual Therapy, Medication Management, Programmatic Care        Reviewed case and recommendations with attending provider. Attending Name: MD Mora       Attending concurs with disposition: yes       Patient and/or validated legal guardian concurs with disposition:   no (72 hh placed)       Final disposition:  observation    Legal status on admission: 72 Hour Hold    Assessment Details   Total duration spent with the patient: 30 min     CPT code(s) utilized: 45955 - Psychotherapy for Crisis - 60 (30-74*) min    Анна Bradshaw [facility], Psychotherapist  DEC - Triage & Transition Services  Callback: [phone]

## 2024-09-18 NOTE — ED NOTES
[facility]  ED to EMPATH Checklist:      Goal for [facility]MPATH: Paranoia management    Current Behavior: Hyperactive (agitated, impulsive) and Quiet    Safety Concerns: None    Legal Hold Status: [facility]    Medically Cleared by ED provider: Yes    Patient Therapeutically Searched: Therapeutic search by ED staff (strings, belts, shoes, pockets, electronics, etc.) and Security wanded    Belongings: Sealed and put in locker per unit protocol    Independent Ambulation at Baseline: Yes/No: Yes    Participates in Care/Conversation: Yes/No: Yes    Patient Informed about EMPATH: Yes/No: Yes    DEC: Ordered and completed    Patient Ready to be Transferred to EMPATH? Yes/No: Yes

## 2024-09-18 NOTE — PROGRESS NOTES
Ambar Borges  September 18, 2024  Plan of Care Hand-off Note     Patient Care Path: observation    Plan for Care:   Pt is a 48 y/o female with a psychiatric hx of anxiety, depression and alcohol use. At this time it does appear that Pt would benefit from further observation and psychiatric stabilization via medication management and ED level therapeutic interventions, due to acute onset of psychosis sx and disorganized behavior.  Pt was not able to fully safety plan with writer. Pt does appear to be at higher risk of death by suicide accidental or intentional due to mental health hx and substance use hx. If Pt is able to effectively safety plan and/or Pts sx improve it would be beneficial to pursue a less restrictive alternative. Per MD recommendation Pt was placed on a 72 hour hold on 9/18/24 at 1228. Per writer recommendation Pts care path will remain observation and goal is for Pt to be able to transfer to [facility] for further evaluation/stabilization. Pt was not placed on the IP MH worklist due to hopeful mitigation of current psychosis sx with medications and appropriate ED interventions. Writer also recommends possible PHP referral and psychiatry follow up.    Identified Goals and Safety Issues:  reduce psychosis sx and disorganization     Overview:  Pt presented to the ED due to concerns of psychosis sx and disorganized behavior. Pt was seen on 9/15 in the ED and was discharged home, Pts sx continued to persist and Pt has not been sleeping and having increased hallucinations and paranoid ideations. While Pts  was driving Pt to the ED Pt attempted to jump out of the car multiple times.                Legal Status: Legal Status at Admission: 72 Hour Hold  72 Hour Hold - Date/Time Initiated: 09/18/24 at 1228  72 Hour Hold - Date/Time Ends: 09/23/24 at 1228    [facility]        Updated  attending provider regarding plan of care.           Анна Bradshaw, Psychiatric

## 2024-09-18 NOTE — ED TRIAGE NOTES
Pt arrives via triage presenting for psych evaluation. Per , pt was seen this weekend at [facility] and discharged and was better at home. Symptoms have started to get worse throughout the week. Pt now hallucinating, thinking she can read minds. Per , pt jumped out of car multiple times on the way here for self harm. Pt poor historian and not cooperative with vitals in triage.      Triage Assessment (Adult)       Row Name 09/18/24 0636          Triage Assessment    Airway WDL WDL        Respiratory WDL    Respiratory WDL WDL        Skin Circulation/Temperature WDL    Skin Circulation/Temperature WDL WDL        Cardiac WDL    Cardiac WDL WDL        Peripheral/Neurovascular WDL    Peripheral Neurovascular WDL WDL        Cognitive/Neuro/Behavioral WDL    Cognitive/Neuro/Behavioral WDL X  hallucinating.

## 2024-09-19 VITALS
TEMPERATURE: 98.1 F | DIASTOLIC BLOOD PRESSURE: 84 MMHG | OXYGEN SATURATION: 98 % | HEART RATE: 105 BPM | BODY MASS INDEX: 22.82 KG/M2 | RESPIRATION RATE: 18 BRPM | SYSTOLIC BLOOD PRESSURE: 128 MMHG | WEIGHT: 137 LBS | HEIGHT: 65 IN

## 2024-09-19 LAB
ALBUMIN UR-MCNC: NEGATIVE MG/DL
APPEARANCE UR: CLEAR
BACTERIA #/AREA URNS HPF: ABNORMAL /HPF
BILIRUB UR QL STRIP: NEGATIVE
COLOR UR AUTO: ABNORMAL
GLUCOSE UR STRIP-MCNC: NEGATIVE MG/DL
HGB UR QL STRIP: NEGATIVE
KETONES UR STRIP-MCNC: ABNORMAL MG/DL
LEUKOCYTE ESTERASE UR QL STRIP: ABNORMAL
MUCOUS THREADS #/AREA URNS LPF: PRESENT /LPF
NITRATE UR QL: NEGATIVE
PH UR STRIP: 5.5 [PH] (ref 5–7)
RBC URINE: 1 /HPF
SP GR UR STRIP: 1.01 (ref 1–1.03)
SQUAMOUS EPITHELIAL: 1 /HPF
UROBILINOGEN UR STRIP-MCNC: NORMAL MG/DL
WBC URINE: 2 /HPF

## 2024-09-19 PROCEDURE — G0378 HOSPITAL OBSERVATION PER HR: HCPCS

## 2024-09-19 PROCEDURE — 87086 URINE CULTURE/COLONY COUNT: CPT | Performed by: PSYCHIATRY & NEUROLOGY

## 2024-09-19 PROCEDURE — 250N000012 HC RX MED GY IP 250 OP 636 PS 637: Performed by: NURSE PRACTITIONER

## 2024-09-19 PROCEDURE — 81001 URINALYSIS AUTO W/SCOPE: CPT | Performed by: NURSE PRACTITIONER

## 2024-09-19 PROCEDURE — 250N000013 HC RX MED GY IP 250 OP 250 PS 637: Performed by: NURSE PRACTITIONER

## 2024-09-19 PROCEDURE — 99239 HOSP IP/OBS DSCHRG MGMT >30: CPT | Performed by: PSYCHIATRY & NEUROLOGY

## 2024-09-19 RX ORDER — OLANZAPINE 5 MG/1
5 TABLET ORAL
Qty: 7 TABLET | Refills: 0 | Status: SHIPPED | OUTPATIENT
Start: 2024-09-19

## 2024-09-19 RX ADMIN — BUPRENORPHINE AND NALOXONE 1 FILM: 8; 2 FILM BUCCAL; SUBLINGUAL at 12:37

## 2024-09-19 RX ADMIN — MULTIPLE VITAMINS W/ MINERALS TAB 1 TABLET: TAB at 12:36

## 2024-09-19 RX ADMIN — LEVOTHYROXINE SODIUM 150 MCG: 75 TABLET ORAL at 12:36

## 2024-09-19 ASSESSMENT — ACTIVITIES OF DAILY LIVING (ADL)
ADLS_ACUITY_SCORE: 37

## 2024-09-19 ASSESSMENT — COLUMBIA-SUICIDE SEVERITY RATING SCALE - C-SSRS
TOTAL  NUMBER OF ABORTED OR SELF INTERRUPTED ATTEMPTS SINCE LAST CONTACT: NO
TOTAL  NUMBER OF INTERRUPTED ATTEMPTS SINCE LAST CONTACT: NO
1. SINCE LAST CONTACT, HAVE YOU WISHED YOU WERE DEAD OR WISHED YOU COULD GO TO SLEEP AND NOT WAKE UP?: NO
2. HAVE YOU ACTUALLY HAD ANY THOUGHTS OF KILLING YOURSELF?: NO
SUICIDE, SINCE LAST CONTACT: NO
ATTEMPT SINCE LAST CONTACT: NO
6. HAVE YOU EVER DONE ANYTHING, STARTED TO DO ANYTHING, OR PREPARED TO DO ANYTHING TO END YOUR LIFE?: NO

## 2024-09-19 NOTE — PROGRESS NOTES
"  Triage and Transition Services Extended Care Reassessment     Patient: Ambar goes by \"Ambar,\" uses she/her pronouns  Date of Service: September 19, 2024  Site of Service: [facility] EMERGENCY DEPT                             EMP06  Patient was seen yes  Mode of Assessment: In person     Reason for Reassessment: other (see comment)    History of Patient's Original Emergency Room Encounter: Pt presented  with a distressed and somewhat blunted affect. Pts mood is congruent with this presentation. Pt was oriented x4. Pt was cooperative during assessment but did appear to be internally preoccupied and easily distracted during assessment. Pt was easily redirectable. Pt does appear to be a poor historian and Pt needed several prompts to answer assessment questions. Pt exhibited thought blocking and Pt endorsed feeling \"confused, I am not being rational.\" Pt has a psychiatric hx of anxiety, depression and alcohol use. Pt does not have a hx of past IP  admission. Pt currently has no outpatient providers but was in the process to establish care in the community. During assessment Pt denied any SI but endorsed a hx of passive SI. During assessment today Pt denied any SIB but per assessment on 9/15 Pt endorsed SIB via cutting. Pt denied any HI and has no hx of aggressive or violent behavior. Pt endorsed a hx of daily alcohol use about \"1-3 beers a day\" and denied any other substance use. Pt endorsed lack of sleep and distressing AH - \"voices\" Pt was not able to articulate the nature of her AH, Pt endorsed \"other people can hear them too.\"  Per collateral Pt has been having increased hallucinations and making delusional statements about \"reading peoples minds.\" Per collateral has not been sleeping and not eating. Pt has been caring for her ADL's.    Presentation Summary: Pt presented with a somewhat anxious affect. Pts mood was congruent with this presentation. Pt was oriented x4. Pt was engaged and " "cooperative with assessment. Pt endorsed mood improvement and per assessment yesterday Pt was more organized but presented with mild confusion. Pt endorsed that her AH have improved and it \"is more of a conversation within my head now.\" Pt denied any SI/SIB/HI. Pt endorsed willingness to follow up with outpatient providers and open to a PHP referral.    Changes Observed Since Initial Assessment: decrease in presenting symptoms, patient/family request    Therapeutic Interventions Provided: Engaged in safety planning, Engaged in cognitive restructuring/ reframing, looked at common cognitive distortions and challenged negative thoughts., Engaged in guided discovery, explored patient's perspectives and helped expand them through socratic dialogue., Coached on coping techniques/relaxation skills to help improve distress tolerance and managing intense emotions., Reviewed healthy living that supports positive mental health, including looking at sleep hygiene, regular movement, nutrition, and regular socialization., Identified and practiced coping skills.    Current Symptoms: anxious sadness anxious forgetful loss of appetite    Mental Status Exam   Affect: Appropriate  Appearance: Disheveled  Attention Span/Concentration: Attentive  Eye Contact: Engaged    Fund of Knowledge: Appropriate   Language /Speech Content: Fluent  Language /Speech Volume: Normal  Language /Speech Rate/Productions: Normal  Recent Memory: Intact  Remote Memory: Intact  Mood: Anxious, Depressed  Orientation to Person: Yes   Orientation to Place: Yes  Orientation to Time of Day: Yes  Orientation to Date: Yes     Situation (Do they understand why they are here?): Yes  Psychomotor Behavior: Normal  Thought Content: Hallucinations  Thought Form: Intact, Paranoia    Treatment Objective(s) Addressed: rapport building, identifying an appropriate aftercare plan, identifying and practicing coping strategies, processing feelings, assessing safety, identifying " treatment goals, safety planning    Patient Response to Interventions: acceptance expressed, verbalizes understanding    Progress Towards Goals:  Patient Reports Symptoms Are: improving  Patient Progress Toward Goals: is making progress  Comment: Pt has been receptive to ED interventions.  Next Step to Work Toward Discharge: symptom stabilization  Symptom Stabilization Comment: Pt denied any SI/SIB/HI. Pt endorsed decreased AH and mood improvement    Case Management: Summary of Interaction: Spoke with Pts  about follow up with outpatient providerds and PHP referral    C-SSRS Since Last Contact:   1. Wish to be Dead (Since Last Contact): No  2. Non-Specific Active Suicidal Thoughts (Since Last Contact): No     Actual Attempt (Since Last Contact): No  Has subject engaged in non-suicidal self-injurious behavior? (Since Last Contact): No  Interrupted Attempts (Since Last Contact): No  Aborted or Self-Interrupted Attempt (Since Last Contact): No  Preparatory Acts or Behavior (Since Last Contact): No  Suicide (Since Last Contact): No     Calculated C-SSRS Risk Score (Since Last Contact): No Risk Indicated    Plan: Final Disposition / Recommended Care Path: discharge  Plan for Care reviewed with assigned Medical Provider: yes  Plan for Care Team Review: provider, RN  Patient and/or validated legal guardian concurs: yes  Clinical Substantiation: At this time IP MH admission is not being recommended due to Pt not endorsing any active SI/SIB/HI. Pt endorsed some psychosis sx, and mild confusion but her sx do not appear to be acute in nature. Pt was able to fully safety plan with writer. Pt does appear to be at higher risk of death by suicide accidental or intentional due to mental health hx and substance use hx.  At this time IP MH admission does not appear to be the most therapeutically beneficial intervention/ least restrictive level of care for Pt. Pt appears to be able to use and motivated to engage in supportive  mental health/ community resources. Pt was placed on a 72 hour hold on 9/18/24 and upon psychiatry consultation Pts hold was discontinued. Pt was open to psychiatry referral and PHP referral.    Legal Status: Legal Status at Admission: Voluntary/Patient has signed consent for treatment  72 Hour Hold - Date/Time Initiated: 09/18/24 at 1228  72 Hour Hold - Date/Time Ends: 09/23/24 at 1228    Session Status: Time session started: 1300  Time session ended: 1316  Session Duration (minutes): 16 minutes  Session Number: 1  Anticipated number of sessions or this episode of care: 1    Session Start Time: 1300  Session Stop Time: 1316  CPT codes: 96214 - Psychotherapy (with patient) - 30 (16-37*) min  Time Spent: 16 minutes      CPT code(s) utilized: 71664 - Psychotherapy (with patient) - 30 (16-37*) min    Diagnosis:   Patient Active Problem List   Diagnosis Code    Malignant neoplasm of thyroid gland (H) C73    Postsurgical hypothyroidism E89.0    Hirsutism L68.0    Oligomenorrhea N91.5    Anxiety F41.9    Major depression F32.9    Brief psychotic disorder (H) F23    Auditory hallucinations R44.0    Acute psychosis (H) F23    Alcoholic intoxication without complication (H24) F10.920       Primary Problem This Admission: Active Hospital Problems    Brief psychotic disorder (H)      Auditory hallucinations      Acute psychosis (H)      Alcoholic intoxication without complication (H24)        Анна Bradshaw, [facility]   Licensed Mental Health Professional (LMHP), Extended Care  [phone]

## 2024-09-19 NOTE — ED NOTES
Patient is discharged with safety plan in place. Discharge instructions reviewed with patient including follow-up care plan. Zyprexa filled by [place] outpatient [facility] and delivered to the patient upon discharge. Reviewed safety plan and outpatient resources. Denies SI and HI. All belongings that were brought into the hospital have been returned to patient. Escorted off the unit at 1543 accompanied by [facility] staff. Discharged to her home via taxi cab.

## 2024-09-19 NOTE — PROGRESS NOTES
Pt continues to sleep on bench near art table. Changing positions independently. No signs of distress.

## 2024-09-19 NOTE — PROGRESS NOTES
Triage & Transition Services, Extended Care     Client Name: Ambar Borges    Date: September 19, 2024    Patient was seen    Mode of Assessment:      Service Type: excused from group session  Session Start Time:  1030    Session End Time: 1030  Session Length: 0  Site Location: [facility] EMERGENCY DEPT                             EMP06  Total Number ofAttendees: 0  Topic:   coping skills/lifestyle management   Response:       [facility]   Licensed Mental Health Professional (LMHP), Extended Care  [phone]

## 2024-09-19 NOTE — PROGRESS NOTES
"RN approached patient to give Haldol and Ativan for increased agitation and hallucinations. Patient agitated upon approach stated \"I know you are just trying to do your job, but I do not want to talk right now.\" Stated with a wide-eyed stare \"you have no idea what is going on right now.\" RN encouraged patient to take the medication. Patient reluctantly took the medications. Patient put the medications in her mouth, but did not swallow them or take them with water. Pills visible in patients mouth under her tongue. RN had to prompt patient to drink water to swallow the medications. RN encouraged patient to call her  for reassurance, and patient stated he won't . Patient walked over to the phone, picked up the phone and immediately hung up the phone. Patient returned to table in milieu and is observed staring into space, and talking to herself.   "

## 2024-09-19 NOTE — PROGRESS NOTES
Pt met with provider and plan to discharge home today with zyprexa. Pt agreeable to plan. Awaiting medications from pharmacy. Pt will call  to schedule a  time.

## 2024-09-19 NOTE — PROGRESS NOTES
Patient approached nursing station stated she was looking for her dinner tray that she returned earlier. RN asked patient if she wanted something to eat or drink. Patient expressed that she would be interested in something to eat and thanked RN for offer. RN showed patient to kitchenette and oriented patient on how to help herself to food and beverages. Patient verbalized understanding and helped herself to snacks.

## 2024-09-19 NOTE — PROGRESS NOTES
Triage & Transition Services, Extended Care     Client Name: Ambar Borges  Date: September 18, 2024    Service Type: refused to attend group session  Session Start Time:  1855    Session End Time: 1919  Session Length: 24  Site Location: [facility] EMERGENCY DEPT                             [id number]  Total Number ofAttendees: 1  Topic: Boundaries, Anxiety Management   Response:  Patient did not attend group session     Dre Dudley   Licensed Mental Health Professional (LMHP), Extended Care  [phone]

## 2024-09-19 NOTE — ED NOTES
Pt slept on the bench in the milieu through the night. It was uneventful. No signs of distress or acute issues noted.
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
## 2024-09-19 NOTE — PROGRESS NOTES
Pt sleeping on bench at art table in milieu. Snoring lightly. Repositioning independently. No signs of distress.
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
## 2024-09-19 NOTE — PROGRESS NOTES
Pt slept until 1230. Reports feeling improved following sleep. Still somewhat disorganized and forgetful but easily redirectable. Pt provided urine sample. Reports low appetite but taking a few bites of lunch at art table. Awaiting reassessment.

## 2024-09-20 ENCOUNTER — TELEPHONE (OUTPATIENT)
Dept: BEHAVIORAL HEALTH | Facility: CLINIC | Age: 49
End: 2024-09-20
Payer: COMMERCIAL

## 2024-09-20 ENCOUNTER — PATIENT OUTREACH (OUTPATIENT)
Dept: CARE COORDINATION | Facility: CLINIC | Age: 49
End: 2024-09-20
Payer: COMMERCIAL

## 2024-09-20 ENCOUNTER — VIRTUAL VISIT (OUTPATIENT)
Dept: BEHAVIORAL HEALTH | Facility: CLINIC | Age: 49
End: 2024-09-20
Payer: COMMERCIAL

## 2024-09-20 DIAGNOSIS — F41.1 GENERALIZED ANXIETY DISORDER: Primary | ICD-10-CM

## 2024-09-20 DIAGNOSIS — F33.1 MODERATE EPISODE OF RECURRENT MAJOR DEPRESSIVE DISORDER (H): ICD-10-CM

## 2024-09-20 LAB — BACTERIA UR CULT: NORMAL

## 2024-09-20 PROCEDURE — 90791 PSYCH DIAGNOSTIC EVALUATION: CPT | Mod: 95 | Performed by: SOCIAL WORKER

## 2024-09-20 ASSESSMENT — ANXIETY QUESTIONNAIRES
7. FEELING AFRAID AS IF SOMETHING AWFUL MIGHT HAPPEN: NEARLY EVERY DAY
GAD7 TOTAL SCORE: 20
6. BECOMING EASILY ANNOYED OR IRRITABLE: MORE THAN HALF THE DAYS
2. NOT BEING ABLE TO STOP OR CONTROL WORRYING: NEARLY EVERY DAY
3. WORRYING TOO MUCH ABOUT DIFFERENT THINGS: NEARLY EVERY DAY
5. BEING SO RESTLESS THAT IT IS HARD TO SIT STILL: NEARLY EVERY DAY
1. FEELING NERVOUS, ANXIOUS, OR ON EDGE: NEARLY EVERY DAY
IF YOU CHECKED OFF ANY PROBLEMS ON THIS QUESTIONNAIRE, HOW DIFFICULT HAVE THESE PROBLEMS MADE IT FOR YOU TO DO YOUR WORK, TAKE CARE OF THINGS AT HOME, OR GET ALONG WITH OTHER PEOPLE: VERY DIFFICULT
GAD7 TOTAL SCORE: 20

## 2024-09-20 ASSESSMENT — PATIENT HEALTH QUESTIONNAIRE - PHQ9
SUM OF ALL RESPONSES TO PHQ QUESTIONS 1-9: 25
5. POOR APPETITE OR OVEREATING: NEARLY EVERY DAY

## 2024-09-20 NOTE — PROGRESS NOTES
LOCUS Worksheet     Name: Ambar Borges MRN: [id number]    : 1975      Gender:  female    PMI:  [employer]  Provider Name: Kristal PADILLA, [facility] Provider NPI:  [id number]    Actual level of Care Provided: None at this time; previous emPATH unit visit.    Service(s) receiving or referred to:  Adult PHP at [facility]    Reason for Variance: Pt meets criteria for this level of care due to report of symptoms, duration & severity of symptoms, and decrease in ability to function.      Rating completed by: Kristal PADILLA [facility]      I. Risk of Harm:   3      Moderate Risk of Harm    II. Functional Status:   3      Moderate Impairment    III. Co-Morbidity:   3      Significant Co-Morbidity    IV - A. Recovery Environment - Level of Stress:   3      Moderately Stress Environment    IV - B. Recovery Environment - Level of Support:   3      Limited Support in Environment    V. Treatment and Recovery History:   3      Moderate to Equivocal Response to Treatment and Recovery Management    VI. Engagement and Recovery Project:   3      Limited Engagement and Recovery       21 Composite Score    Level of Care Recommendation:   20 to 22       Medically Monitored Non-Residential Services

## 2024-09-20 NOTE — TELEPHONE ENCOUNTER
Summary of Patient Care Communication Handoff to Patient Navigator Coordinator    PATIENT'S NAME: Ambar Borges  MRN:   [id number]  :   1975    DATE OF SERVICE: 24    Referral Needed: Yes    Is the patient coming from an inpatient unit? No    What program is this referral for? Adult Mental Health Referral    Level of Care Recommended:  Partial Hospitalization Program (PHP)    Specialty Track Recommendations:  MH Specialty Tracks: General Mood Disorder    Schedule Preferences: Schedule Preference:  No schedule preference (Mornings or Afternoons)    Are there any potential barriers for entrance into programmatic care? None identified at this time.    Followed up from  Needed?:  No    Mental Health Referral Needed: No    Release of Information Needed:  HENRI Needed: Other:  Not at this time.    Faxing Needed: No    Follow up Requests:  Patient Navigator Coordinator Follow-Up Needed: Referral to designated [facility].    Comments: Provided pt with a general overview of PHP program, structure, and flow of the day. Pt is aware that she will need to attend in-person on [facility].  Pt would like to get started as soon as spot is available.    Kristal PADILLA, [facility]    Patient Navigator Coordinator Contact Information  Pool Message: [email or web address] ([phone])   Phone:  [phone]  Fax:  [phone]  Email:  [email or web address]
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
## 2024-09-20 NOTE — PROGRESS NOTES
[facility]    Background: Transitional Care Management program identified per system criteria and reviewed by [facility] [place] team for possible outreach.    Assessment: Upon chart review, [facility] Team member will not proceed with patient outreach related to this episode of Transitional Care Management program due to reason below:    Patient has a follow up appointment with an appropriate provider today for hospital discharge    Plan: Transitional Care Management episode addressed appropriately per reason noted above.      EAMON Hedrick  [facility]    *Connected Care Resource Team does NOT follow patient ongoing. Referrals are identified based on internal discharge reports and the outreach is to ensure patient has an understanding of their discharge instructions.

## 2024-09-23 NOTE — TELEPHONE ENCOUNTER
**Patient Navigator Follow Up**    9/23/2024;    Referral for PHP    Comments: Provided pt with a general overview of PHP program, structure, and flow of the day. Pt is aware that she will need to attend in-person on [facility].  Pt would like to get started as soon as spot is available.     Kristal Latif MSW, LICSW      I called and left  for patient to call me back direct.  I provided my direct call back phone#.        9/24/2024;    2nd Attempt:    I left another  for patient to call me back direct.  I provided my direct call back phone# again.      9/30/2024;    Patient returned my call.    I discussed programming with her.  She wanted to be added to the waitlist.  I added her and program will follow up with her directly.    I inquired if she wanted to sign up for [facility].  She said yes.  I sent her link via text (her preferred method).     She is going to work on activating her account.  Once activated I can send her program content through there.      Thank you,    Lashawn TAN  Patient Navigator

## 2024-10-01 ENCOUNTER — TELEPHONE (OUTPATIENT)
Dept: BEHAVIORAL HEALTH | Facility: CLINIC | Age: 49
End: 2024-10-01
Payer: COMMERCIAL

## 2024-10-01 NOTE — TELEPHONE ENCOUNTER
Writer ALEXIS for pt inquiring about interest in PHP at [facility], as pt is currently on waitlist for [facility].

## 2024-10-03 ENCOUNTER — TELEPHONE (OUTPATIENT)
Dept: BEHAVIORAL HEALTH | Facility: CLINIC | Age: 49
End: 2024-10-03
Payer: COMMERCIAL

## 2024-10-04 ENCOUNTER — TELEPHONE (OUTPATIENT)
Dept: BEHAVIORAL HEALTH | Facility: CLINIC | Age: 49
End: 2024-10-04
Payer: COMMERCIAL

## 2024-10-04 NOTE — TELEPHONE ENCOUNTER
Writer ALEXIS for pt to discuss an opening in [facility] next week. Pt was informed she would be removed from waitlist if we did not hear back by end of day today, as writer has left 3 VM this week.

## 2024-10-07 ENCOUNTER — TELEPHONE (OUTPATIENT)
Dept: BEHAVIORAL HEALTH | Facility: CLINIC | Age: 49
End: 2024-10-07
Payer: COMMERCIAL

## 2024-10-07 NOTE — TELEPHONE ENCOUNTER
Writer spoke with pt to discuss PHP start. Pt reported she could not start until 10/11, and would therefore prefer to wait for [facility], as there should be openings at that location next week. [facility] supervisor will call pt with a specific start date.

## 2024-10-08 ENCOUNTER — TELEPHONE (OUTPATIENT)
Dept: BEHAVIORAL HEALTH | Facility: CLINIC | Age: 49
End: 2024-10-08
Payer: COMMERCIAL

## 2024-10-08 ENCOUNTER — BEH TREATMENT PLAN (OUTPATIENT)
Dept: BEHAVIORAL HEALTH | Facility: CLINIC | Age: 49
End: 2024-10-08
Payer: COMMERCIAL

## 2024-10-08 NOTE — TELEPHONE ENCOUNTER
----- Message from Megan Lyon sent at 10/8/2024  9:37 AM CDT -----  Regarding: Pt Starting [facility] on 10/10  Adult Mental Health Programmatic Care Schedule Request    Patient Name: Ambar Borges  Location of programming: [facility]  Start Date: 10/10    Adult Program Group: [facility], Schedule Mon-Fri 830-2    Attending Provider (MD):  Dr. Juan Pablo Marie ([place]); Dr. Abe Hussein ([place])  Visit Type:  In-Person    Accommodations Needed: n/a  Alerts Identified/Substantiation: n/a  Consulted with Supervisor: Megan Eubanks    Send to:   [[facility] ([id number])] ##ONLY if Start Date is determined##  [facility] (BEH BEHAVIORAL OUTPATIENT ASSESSMENTS [51311])   Cora Sweeney ([facility])  Krysten Alfredo (PHP)  Megan Lyon/Raimundo ([facility]: PHP & IOP)

## 2024-10-09 ENCOUNTER — BEH TREATMENT PLAN (OUTPATIENT)
Dept: BEHAVIORAL HEALTH | Facility: CLINIC | Age: 49
End: 2024-10-09
Payer: COMMERCIAL

## 2024-10-09 ENCOUNTER — HOSPITAL ENCOUNTER (OUTPATIENT)
Dept: BEHAVIORAL HEALTH | Facility: CLINIC | Age: 49
Discharge: HOME | End: 2024-10-09
Attending: PSYCHIATRY & NEUROLOGY
Payer: COMMERCIAL

## 2024-10-09 PROCEDURE — G2211 COMPLEX E/M VISIT ADD ON: HCPCS | Mod: 95 | Performed by: PSYCHIATRY & NEUROLOGY

## 2024-10-09 PROCEDURE — 99417 PROLNG OP E/M EACH 15 MIN: CPT | Mod: 95 | Performed by: PSYCHIATRY & NEUROLOGY

## 2024-10-09 PROCEDURE — 99215 OFFICE O/P EST HI 40 MIN: CPT | Mod: 95 | Performed by: PSYCHIATRY & NEUROLOGY

## 2024-10-09 RX ORDER — VENLAFAXINE HYDROCHLORIDE 37.5 MG/1
37.5 CAPSULE, EXTENDED RELEASE ORAL DAILY
COMMUNITY
End: 2024-10-29

## 2024-10-09 NOTE — PROGRESS NOTES
Partial Hospital Program   Physician Certification of Medical Necessity    Patient Legal Name: Ambar Borges   Patient Preferred Name: Ambar  Patient : 1975  Patient MRN: [id number]    Attending physician: Abe Hussein MD      Admission Certification:    I certify the above-named patient would require inpatient psychiatric care if partial hospitalization services were not provided and that the patient requires such PHP services for a minimum of 20 hours per week. These services are provided under the care and supervision of a physician. An individualized, written plan of treatment has been created and will be periodically reviewed by the physician and the patient in concert with a multidisciplinary treatment team.    From admission date: 10/9/24 to  day: 24    Abe Hussein MD on 10/9/2024 at 2:00 PM

## 2024-10-09 NOTE — PROGRESS NOTES
"RN Review of Medical History / Physical Health Screen  Outpatient Mental Health Programs - [facility] Adult Partial Hospitalization Program    PATIENT'S NAME: Ambar Borges  Preferred name: Ambar   49 year old  MRN:   [id number]  :   1975  ACCT. NUMBER: [id number]  CURRENT AGE:  49 year old    DATE OF DIAGNOSTIC ASSESSMENT: DATE OF SERVICE:  24  DATE OF ADMISSION: 10/10/24    Please see Diagnostic Assessment for additional Medical History.     General Health:   Have you had any exposure to any communicable disease in the past 2-3 weeks? no     Do you have a history of seizures?     If so, do you have a seizure plan? Known triggers?     Notify patient that we will call 911 (if virtual) or a code (if in-person), if we were to witness seizure during group. no         Nutrition:    Are you on a special diet? If yes, please explain:  no   Do you have any concerns regarding your nutritional status? If yes, please explain:  no   Have you had any appetite changes in the last 3 months?  Yes,      Have you had any weight loss or weight gain in the last 3 months?  Yes, how much? Lost 30 lbs  in 6 weeks           Height/Weight Review:  Patient reported height:  5'5\"   Patient reports weight:  Date last checked:  137 lbs   10/14/24       Patient height and weight recorded by RN in epic flowsheet: No; Unable to measure  Programmatic Care currently provided via telehealth. All pt weights and heights will be collected through patient self-report and recorded in physical health screening progress note upon admission to the program.      BMI Review:  Was the patient informed of BMI? No.     Findings Normal, No Intervention         Fall Risk:   Have you had any falls in the past 3 months? Yes, pet pulled patient and fell over, patient states she is ok and no injuries.     Do you currently use any assistive devices for mobility?   no      Does the patient have medication concerns? Yes pt needs to tweak " medications     Was an MTM referral placed? no      Does the patient have any acute or chronic pain concerns that might impact participation in the program? no       Additional Comments/Assessment:     Per completion of the Medical History / Physical Health Screen, is there a recommendation to see / follow up with a primary care physician/clinic or dentist?    Yes, Recommendations:   medications  physical exam          Bentley Alcala RN  10/9/2024

## 2024-10-09 NOTE — PROGRESS NOTES
Individualized Treatment Plan       Patient's Name: Ambar Borges   Preferred Name: Ambar  Pronouns:  She/Her   :   1975  MRN:   [id number]    Program Admission Date: 10/10/24     Estimated Program Discharge Date: 10/25/24 (Please note, this date is subject to change based on needs and progress toward identified goals.)      Date of Initial Treatment Plan: 10/10/24    Chief Concern: I am in this program because:     Long Term Goal: Patient did not provide goal.        Goal Areas:  GOAL AREA 1: Personal Safety       Goal Status: Stopped; Patient did not create goal as patient discharge early.       GOAL AREA 2: Symptom Management      Goal Status: Stopped; Patient did not create goal as patient discharge early.        GOAL AREA 3: Daily Life Skills         Goal Status: Stopped, Patient did not create goal as patient discharge early.      GOAL AREA 4: Wellness  Goal Status: Stopped, Patient did not create goal as patient discharge early.       GOAL AREA 5: Aftercare Plan  Goal Status: Stopped, Patient did not create goal as patient discharge early.       My Strengths:   caring, good listener, and willing to relate to others    My Limitations or Vulnerabilities:  Anxiety  Depressive symptoms     My Other Health Issues:  None reported    Supports:    I want to include the following support people in my treatment: Patient did not report any information.   Please note we cannot share information with anyone unless you sign a release of information. You can specify what information can and cannot be shared and you can retract that written permission at any time.    Staff and support people can help me by:  Patient did not report any information.     Cultural Values and Needs: None reported    Assessments: The following assessments were completed by patient for this visit:  PHQ9:       2024     1:54 PM   PHQ-9 SCORE   PHQ-9 Total Score 25     GAD7:       2024     1:54 PM   KRYSTIN-7 SCORE   Total Score 20      PROMIS 10-Global Health (only subscores and total score):       9/20/2024     1:54 PM   PROMIS-10 Scores Only   Global Mental Health Score    Global Physical Health Score    PROMIS TOTAL - SUBSCORES        Information is confidential and restricted. Go to Review Flowsheets to unlock data.        Diagnosis/es:  No diagnosis found.     KRYSTIN severe  Panic disorder per history  MDD, recurrent moderate with no psychosis  Alcohol use disorder, moderate  Opioid use disorder severe, in remission with suboxone    Level of Care: Adult Mental Health Partial Hospitalization Program  Program Track: PHP 1    Multidisciplinary Team Members: [facility] Kindra Hussein; Megan Eubanks, [facility]; Jaren Atwood, Bentley Alcala, Osiris Story    Program services: Group and Individual Psychotherapy (at least 1 hour per day), Patient Education and Training Related to Treatment (at least one hour per day), Occupational Therapy and/or Nurse Liaison Education and Support (at least one hour per day), Psychiatric Evaluation and Management (at intake and least once per month), Patient-Centered Support Network Counseling (at least every other week)    Staff Interventions:  Assist to identify treatment goals, including identifying and problem-solving barriers. Assist in identifying strengths and how to mobilize them in meeting treatment goals. Assist in identifying limitations and other health concerns, and ways to manage those in the recovery process and beyond. Assist in identifying and helping to establish, maintain, and strengthen support network. Assist with and facilitate discharge planning, including connection with available and appropriate community services.      Ongoing psychotherapeutic and multidisciplinary assessment. Regular meetings with psychiatrist or other independent psychiatric provider. Assessment by registered nurse liaisons at admission and ongoing/as needed. Complete OT assessment where applicable/as needed. Complete  functional assessment(s) where applicable/as needed.    Plan for and help with maintaining safety, including revising and updating written safety plan where applicable. Assist in identifying and applying coping skills. Assist in identifying and problem solving barriers to treatment and clinical progress. Utilize motivational interviewing techniques to promote change. Provide education to promote informed decisions and decision-making. Utilize motivational interviewing techniques to promote change.     Provide education regarding how to effectively use group process. Teach skills to help identify and manage thoughts, behaviors, and emotions, with specific skills/topics including: emotional regulation, identification of values, understanding and modifying distorted thinking, understanding and coping with grief and loss, shame, self-compassion, self-awareness, mindfulness, radical acceptance, distress tolerance skills, hope, problem-solving, communication, interpersonal effectiveness, distress tolerance. Facilitate increased self-awareness.       Provide education regarding: life balance/structure/routine, goal setting and integration, prioritizing and planning, leisure values, behavior activation, motivation, energy management, stress management, neuroscience of change, sensory modulation, window of tolerance, ANS and vagus nerve activation, sensory enhanced mindfulness, sensory/body based grounding skills.    Provide education regarding: eight dimensions of wellness, sleep hygiene, medication education and management, stigma, nutrition, signs and symptoms, community resources, support network education.       Abuse Prevention Plan:   Treatment team will provide education regarding skill development to address symptom management, life skills, wellness, discharge planning, and personal safety.  Collaborate with patients internal and external providers to coordinate care.  Treatment will be provided in a safe,  "therapeutic environment.  Program provider will offer medication adjustment/management as needed/indicated.     Patient Participation in Plan:  This plan was developed by the patient named at the top of the document with assistance from the multidisciplinary care team. The patient agrees with this care plan, developed goals, and has received a copy. Supports and/or family have been invited to participate in the creation of this plan at the discretion of the patient.     Discharge Criteria:   Patient will discharge from the program when the goals above have been met, the patient is otherwise deemed to no longer need and/or benefit from the program/this level of care, another treatment modality is identified that would better meet treatment needs and goals, and/or the patient opts to discharge from the program for any reason.    Acknowledgement of Current Treatment Plan       I have reviewed my treatment plan with my treatment team members.  I agree with the plan as it is written in the electronic health record.     Name:                   Signature:                                                          Date:  Ambar Hussein MD  Psychiatrist/Medical Director         NOTE: Patient signatures are completed manually and scanned into the electronic medical record. See \"Media\" tab in epic.         "

## 2024-10-09 NOTE — DISCHARGE SUMMARY
Outpatient Mental Health Program Discharge Summary / Instructions - Adult       PATIENT'S NAME: Ambar Borges   MRN:   [id number]  :   1975    PROGRAM PARTICIPATION: Adult Partial Hospitalization Program (PHP)  Track: PHP    ADMISSION DATE: 10/10/24  DISCHARGE DATE: 10/16/24      Reason for Discharge:   Incomplete treatment: patient decision to discontinue treatment program prior to completion.    Diagnosis:    Principal DSM5 Diagnoses  (Sustained by DSM5 Criteria Listed Above):   296.32 (F33.1) Major Depressive Disorder, Recurrent Episode, Moderate _ and With mixed features  300.02 (F41.1) Generalized Anxiety Disorder.    Medications: (include name, dose, and frequency)    Current Outpatient Medications   Medication Sig Dispense Refill    B Complex Vitamins (VITAMIN  B COMPLEX) tablet Take 1 tablet by mouth daily.      buprenorphine HCl-naloxone HCl (SUBOXONE) 8-2 MG per film Place 1 Film under the tongue 2 times daily as needed (symptoms).      clonazePAM (KLONOPIN) 0.5 MG tablet Take 0.5 mg by mouth 3 times daily as needed for anxiety.      ibuprofen (ADVIL) 200 MG capsule Take 600 mg by mouth 2 times daily as needed for mild pain.      levothyroxine (SYNTHROID, LEVOTHROID) 150 MCG tablet Take 1 tablet by mouth daily. 90 tablet 3    progesterone (PROMETRIUM) 100 MG capsule Take 200 mg by mouth at bedtime.      Turmeric (QC TUMERIC COMPLEX PO) Take 1 capsule by mouth daily.      venlafaxine (EFFEXOR XR) 37.5 MG 24 hr capsule Take 37.5 mg by mouth daily.      OLANZapine (ZYPREXA) 5 MG tablet Take 1 tablet (5 mg) by mouth 2 times daily. 7 tablet 0    OLANZapine (ZYPREXA) 5 MG tablet Take 1 tablet (5 mg) by mouth 2 times daily. 60 tablet 1     No current facility-administered medications for this visit.         DISCHARGE PLAN / RECOMMENDATIONS TO MANAGE SYMPTOMS AND PREVENT RELAPSE:    Take medications as prescribed.    Annual physical exams and any medical concerns that may arise.    Follow up with your  "providers and appointments.    If you are interested in mental health services through [facility] in the future you can call [facility] [phone].      Follow up with the following resources and/or community supports:   National [place] on Mental Illness   Https://namimn.org/    [place] Warm line Peer support  https://mentalhealthmn.org/support/[place]-warmline/   173-599-2227  Text \"Support\" to [address]807    [place] Mental Health Help line  917.434.1317     Report symptoms and significant changes to your care team: including any safety concerns, thoughts of suicide or homicide, changes in sleep, increased confusion, mood getting worse, feeling more aggressive   Use coping skills: Create a routine or structure that works for your needs, practice mindfulness, move your body intentionally every day, eat regular balanced meals daily, reach out to supports, and practice self-compassion.    Do not use illicit drugs.  Avoid alcohol.    PERSONAL SAFETY / CRISIS MANAGEMENT:  Follow your individualized Safety Coping Plan.  Report increased symptoms and safety concerns to your care team.  Call 911 or go to your nearest emergency department.  Use the crisis resources listed below:    Crisis Resources:  Suicide Prevention Lifeline: 7-701-876-TALK (5128)  Crisis Text Line Service (available 24 hours a day, 7 days a week): Text [place] to 036713  Call  **CRISIS (099896) from a cell phone to talk to a team of professionals who can help you.    Crisis Services By [facility]: Phone Number:   [place]     [phone]   [place]    [phone]   [place] ([place])    [phone]   [place]    [phone]   [place]                                                    [phone] (after hours)   [place]   [phone]   [place]     [phone]       The above discharge plan and recommendations were created to help you manage your mental health and to improve your overall functioning and well-being.     Not following the " above recommendations may result in an increase of symptoms, potential safety risks and a need for increased services.    We appreciate the opportunity to work with you and sincerely thank you for choosing [facility].      Thank You!  Thank you for choosing [facility] for your care - it was an honor to serve you. One way we continue to improve is by listening to patients and family members. If you receive a survey, please take the time to complete it and share your experience with us. Our goal is to always provide great care and an excellent experience. We hope you feel comfortable recommending our services to your family and friends.       Your treatment team: Abe Hussein MD; ABNER Johnson, [facility]; Pierre Alcala RN; Osiris Story, OT: ABNER Shelton        Patient Signature: Patient not present for signature Date: 10/16/24

## 2024-10-09 NOTE — PROGRESS NOTES
Javier Safety Plan       Creation Date: 9/15/24 Last Update Date: 9/19/24      Step 1: Warning signs:     Warning Signs     Lack of sleep     Lack of appetite      Step 2: Internal coping strategies - Things I can do to take my mind off my problems without contacting another person:     Strategies     Art     Gardening     Watching TV     Video games      Step 3: People and social settings that provide distraction:     Name Contact Information     [place]  [phone]        Places     Outside in the garden      Step 4: People whom I can ask for help during a crisis:     Name Contact Information     [place],  [phone]      Step 5: Professionals or agencies I can contact during a crisis:     Clinician/Agency Name Phone Emergency Contact     [facility] crisis line [phone]          [facility] Emergency Department Emergency Department Address Emergency Department Phone     [place] [address] [place] [address] [phone]     [place] [address] [address] [phone]      Suicide Prevention Lifeline Phone: Call or Text 884  Crisis Text Line: Text HOME to 729745     Step 6: Making the environment safer (plan for lethal means safety):   Did not identify any lethal methods     Optional: What is most important to me and worth living for?:      Javier Safety Plan. Amy Dueñas and Lucas Ashton. Used with permission of the authors.

## 2024-10-09 NOTE — PROGRESS NOTES
"    Admission SBAR NOTE  Adult  Outpatient Programs          SITUATION:     Admission Date: 10/9/2024    Provider verified identity through the following two step process.  Patient provided: verbal spelling of full first and last name and Patient     Patient name:  Ambar Borges  Preferred name: Ambar She/Her/Hers/Herself 49 year old  Diagnosis/-es (copy from [facility], including ICD-10):    Principal DSM5 Diagnoses  (Sustained by DSM5 Criteria Listed Above):   296.32 (F33.1) Major Depressive Disorder, Recurrent Episode, Moderate _ and With mixed features  300.02 (F41.1) Generalized Anxiety Disorder.    Assigned Program/Track: [facility]    Reviewed patient's schedule and informed them of any variation due to holidays. no    Does the patient have any planned absences and/or barriers to admission/treatment? Yes Dr appointment virtual pt will update   NOTE: impact of transportation, technology, childcare, work, or housing concerns.    Insurance: Payor: BCBS / Plan: BCBS OUT OF STATE / Product Type: Indemnity /  Changes/Concerns: no    Does patient need an appointment with the program provider? yes  NOTE: If yes, please confirm/schedule provider visit.      BACKGROUND:     Patient's stated goal/reason for treatment (copy from [facility]; confirm with patient): \"Chief Complaint:   The reason for seeking services at this time is: \"I have been an anxious person nearly my entire life. I thought I was handling it very well, and it started to take over about a month ago\". The pt reported that she went to help (2) 91 y/o adults in her extended family in their home as they live independently and do not have consistent help outside of the home. Pt reports that her anxiety was triggered as she felt she was \"obsessing\" over the situation and wanting better for them in their stage of life.  Pt was overstimulated and ready to leave at the end of week. Since then, her anxiety & depression symptoms have increased and she has felt unable to " "get back on track. Pt presented to the emPATH unit at [facility] on 9/14/2024 as she did not feel in control of herself or her thoughts. Pt stated, \"I could never do anything to hurt myself or end my life\" but does endorse passive thoughts of no longer wanting to be alive and feeling like a burden.     Patient has attempted to resolve these concerns in the past through individual therapy, group therapy, and medication management through a provider.\"      ASSESSMENT:     Please consult  if any of the following concerns may impact admission/participation in program:     PHQ, KRYSTIN and PROMIS done within 7 days OR send upon admission if over 7 days.      [place] Suicide Severity Rating (select Lifetime/Recent): [place] Suicide Severity Rating Scale (Lifetime/Recent)      7/9/2024     7:36 PM 9/14/2024     8:08 PM 9/15/2024    10:00 AM 9/18/2024    12:47 PM 9/18/2024     3:22 PM 10/12/2024    12:23 PM 10/12/2024    10:36 PM   [place] Suicide Severity Rating (Lifetime/Recent)   Q1 Wished to be Dead (Past Month) 0-->no 0-->no  1-->yes 0-->no 1-->yes 0-->no   Q2 Suicidal Thoughts (Past Month) 0-->no 0-->no  1-->yes 1-->yes 1-->yes 0-->no   Q3 Suicidal Thought Method    0-->no 0-->no     Q4 Suicidal Intent without Specific Plan    0-->no 0-->no     Q5 Suicide Intent with Specific Plan    0-->no 0-->no     Q6 Suicide Behavior (Lifetime) 0-->no 0-->no   0-->no 0-->no    Level of Risk per Screen no risks indicated no risks indicated  low risk low risk low risk no risks indicated   Q1 Wish to be Dead (Lifetime)   Y       1. Wish to be Dead (Past 1 Month)   Y       Wish to be Dead Description (Past 1 Month)   Had thoughts of wanting to escape and not have to live through her anxiety or auditory hallucinations forever. During assessment, denies any suicidal ideation.       Q2 Non-Specific Active Suicidal Thoughts (Lifetime)   Y       2. Non-Specific Active Suicidal Thoughts (Past 1 Month)   N   "     3. Active Suicidal Ideation with any Methods (Not Plan) Without Intent to Act (Lifetime)   Y       Q3 Active Suicidal Ideation with any Methods (Not Plan) Without Intent to Act (Past 1 Month)   N       Q4 Active Suicidal Ideation with Some Intent to Act, Without Specific Plan (Lifetime)   Y       4. Active Suicidal Ideation with Some Intent to Act, Without Specific Plan (Past 1 Month)   N       Q5 Active Suicidal Ideation with Specific Plan and Intent (Lifetime)   Y       5. Active Suicidal Ideation with Specific Plan and Intent (Past 1 Month)   N       Most Severe Ideation Rating (Lifetime)   5       Most Severe Ideation Rating (Past 1 Month)   1 1      Frequency (Past 1 Month)   1 1      Duration (Past 1 Month)   1 1      Controllability (Past 1 Month)   2 2      Deterrents (Past 1 Month)   1 1   0   Reasons for Ideation (Past 1 Month)   5 4   0   Actual Attempt (Lifetime)   Y       Total Number of Actual Attempts (Lifetime)   2       Actual Attempt Description (Lifetime)   Pt identifies when age 5 and 6 by cutting herself and drinking .       Actual Attempt (Past 3 Months)   N N   N   Has subject engaged in non-suicidal self-injurious behavior? (Lifetime)   Y       Has subject engaged in non-suicidal self-injurious behavior? (Past 3 Months)   Y N   N   Interrupted Attempts (Lifetime)   N       Interrupted Attempts (Past 3 Months)    N   N   Aborted or Self-Interrupted Attempt (Lifetime)   N       Aborted or Self-Interrupted Attempt (Past 3 Months)    N   N   Preparatory Acts or Behavior (Past 3 Months)    N   N   Actual Lethality/Medical Damage Code (Most Recent Attempt)   0       Potential Lethality Code (Most Recent Attempt)   1       Actual Lethality/Medical Damage Code (Most Lethal Attempt)   0       Potential Lethality Code (Most Lethal Attempt)   1       Actual Lethality/Medical Damage Code (Initial/First Attempt)   0       Potential Lethality Code (Initial/First Attempt)   1        Calculated C-SSRS Risk Score (Lifetime/Recent)   Moderate Risk No Risk Indicated   No Risk Indicated       LOCUS completed for recommended level of care? yes  IOP: 17-19; PHP: 20-22     Copy/Paste current Safety Plan to the BEH TX PLAN ENCOUNTER. yes    Safety status/concerns: No    Substance use concerns: yes  NOTE: if no substance use concerns, OK to delete below:     Patient reported last use of substances as: Alcohol 10/13/24 1.5 beers last use    Patient reports/presents withdrawal/intoxication concerns: No    Pertinent Medical/Nutritional concerns: yes bad back states, and feeling not nutritionally adequate patient wants supplements    Confirm Emergency Contact listed in the SnapShot/Demographics with patient and notify O[facility] if an update is required. yes    Paper or Docusign requirements for ROIs, e-HENRI, emergency contact, etc have been completed? yes  If not, do upon admission.     Does patient have FMLA or Short-Term Disability requests/plans? no  NOTE: Whenever possible, FMLA or Short-Term Disability paperwork needs to be managed/completed by the patient's community provider.   Exceptions: Patient does not have a community provider AND request is specific to mental health and time off for the duration of the program participation.    Notify RN Triage as soon as possible.     Care Providers/Medication Management Needs:     Does patient have a current community or other [facility] provider prescribing medications for mental health?   NOTE: Delete below if not applicable:    Psychiatric Provider (or PCP if managing MH meds)/Name: Dr. Marie  Clinic name/location: Private Practice  Last appointment:  July 2024  Next appointment:  10/16/24     NOTE: Inform patients, program is temporary and we will not be transferring care. Patient's should continue to see their community provider.       Individual Therapist/Name:  RUPERT  Clinic name/location:   Last appointment:   Next appointment:       Patient will continue to see  above provider while participating in program: yes        RECOMMENDATIONS:     Patient Admission Completed: yes    Care Team, referrals made/needed: yes  PCP: No Ref-Primary, Physician  NOTE: Notify RN, as needed, to make internal referrals.                                                             Completed by: Bentley Alcala RN

## 2024-10-09 NOTE — H&P
"[facility]   Adult Mental Health Outpatient Programs  Provider Intake Note    Patient: Ambar Borges  Preferred Name: Ambar  MRN: [id number]  : 1975  Acct. No.: [id number]  Date of Service: 10/09/24  Session Start Time:  12:02 pm  Session End Time:  12:54 pm    Program Track: [facility]    Diagnostic Assessment Date: 24      Identifying Data:  Ambar Borges, a 49 year old-year-old with reported history of panic disorder, anxiety, depression ETOH use disorder and opioid use disorder , presents for initial visit to provide oversight of programmatic care. Patient attended the session alone, uses she/her pronouns, and prefers to be called: \"Ambar\"    Presenting Concern:  \"I am having obsessive racing thoughts.\"   Per diagnostic assessment: \"I have been an anxious person nearly my entire life. I thought I was handling it very well, and it started to take over about a month ago \"    History of Present Illness:  Chart reviewed, history as documented reviewed with Ambar. Patient endorses:  Having severe anxiety where she will get stuck with racing thoughts.  They are often negative about her and bring up things in the past  She hears her own conscious tell her these things but they are often in the voice of a loved one.  She calls them her voices.  She does not hear them outside of her head.  She also has some paranoia that she might be in danger if she leaves the house.  She is careful to make sure she is not giving out her address to be safe  Morning is the worst time of day.  Evening is better.  She gets relief when she walks outside with her  or gardens.  She struggles to initiate anything on her own to help this  She drinks a beer a night and has decreased this on her own over the last 6 months where she was drinking a handle of vodka a day at that time.  No h/o withdrawal symptoms.  She has tried AA but it was not helpful.  She did not like the spiritual aspect to it  She has " "also decreased her klonopin which was prescribed by her addiction medicine provider down to 0.5 mg at night.  She was taking it tid.    She has passive suicidal thoughts that are chronic.  She denies any plan or intent. She denies any past attempts.  She has not worked since 2020 when she was let go from her job due to Covid  Was at the [facility] for hallucinations and paranoia (also slighlty intoxicated with alcohol) on 9/18/24.  Was in the ED for similar issues on 9/14/24    Goals for Treatment (also please see individualized treatment plan):  \"I want to be a better wife\"  \"To be secure in my relationship\"    Review of Symptoms  Review of systems as recorded in diagnostic assessment reviewed with patient.  Today notes:  Sleep: ok  Appetite: ok  Suicidal ideation: has passive suicidal thoughts described as might be better if I weren't here  Thoughts of non-suicidal self-injury: denied  Recent self-injurious behavior: denied  Homicidal ideation: denied  Other safety concerns: denied    Activities of Daily Living and Related Systems Impacted by Illness:  Hygiene: ok  Socialization: poor  Activities of Daily Living: (cleaning, shopping, bills, etc.): poor  Concerns related to work: would like to work again    Substance use:  As stated above in HPI    Current Medications:  Current Outpatient Medications   Medication Sig Dispense Refill    venlafaxine (EFFEXOR XR) 37.5 MG 24 hr capsule Take 37.5 mg by mouth daily.      B Complex Vitamins (VITAMIN  B COMPLEX) tablet Take 1 tablet by mouth daily.      buprenorphine HCl-naloxone HCl (SUBOXONE) 8-2 MG per film Place 1 Film under the tongue 2 times daily as needed (symptoms).      clonazePAM (KLONOPIN) 0.5 MG tablet Take 0.5 mg by mouth 3 times daily as needed for anxiety.      ibuprofen (ADVIL) 200 MG capsule Take 600 mg by mouth 2 times daily as needed for mild pain.      levothyroxine (SYNTHROID, LEVOTHROID) 150 MCG tablet Take 1 tablet by mouth daily. 90 tablet 3    " "OLANZapine (ZYPREXA) 5 MG tablet Take 1 tablet (5 mg) by mouth nightly as needed (insomnia or agitation). 7 tablet 0    progesterone (PROMETRIUM) 100 MG capsule Take 200 mg by mouth at bedtime.      Turmeric (QC TUMERIC COMPLEX PO) Take 1 capsule by mouth daily.         The above list was reviewed and updated in [facility] with patient today.     Patient is taking medications as prescribed and denies adverse effects    Medication History/Past Medication Trials:  Lexapro has worked in the past.  Has tried other selective serotonin reuptake inhibitor's but does not remember them    Remainder of history, including psychiatric, substance, family, social as documented in diagnostic assessment/psychosocial evaluation and/or above    Medical Review of Systems:  Pertinent: None    Laboratory Results:  Most recent labs reviewed. Pertinent updates/findings: None.       Mental Status Examination (limited due to video virtual visit format):  Vital Signs: There were no vitals taken for this virtual visit.  Appearance: adequately groomed, appears older than stated age, and in mild distress.  Attitude: cooperative   Eye Contact: fair to the extent that can be determined in a video visit  Muscle Strength and Tone: no gross abnormalities based on remote observation  Psychomotor Behavior: Appropriate and Fidgety; no evidence of tardive dyskinesia, dystonia, or tics based on remote observation  Gait and Station: normal, no gross abnormalities based on remote observation  Speech: normal rate, production, volume, and rhythm of  Associations: No loosening of associations  Thought Process: coherent and goal directed  Thought Content: no evidence of psychotic thought, passive suicidal ideation present, no auditory hallucinations present, and no visual hallucinations present  Mood: \"anxious\"  Affect: mood congruent  Insight: fair  Judgment: intact, adequate for safety  Impulse Control: intact  Oriented to: time, place, person, and " situation  Attention Span and Concentration: Normal  Language: Intact  Recent and Remote Memory: Delayed & immediate recall intact  Fund of Knowledge/Assessment of Intelligence: Average  Capacity of Activities of Daily Living: Independent, able to participate in programmatic care services.    DSM5 Diagnosis/es:  No diagnosis found.    KRYSTIN severe  Panic disorder per history  MDD, recurrent moderate with no psychosis  Alcohol use disorder, moderate  Opioid use disorder severe, in remission with suboxone    Assessment/Plan:  Ambar presents today for initial psychiatric evaluation as part of oversight of programmatic care.  She has multiple issues that have come together causing her to not be able to function and end up in the ED two times last month.  I do not think she is having hallucinations but feel this is mostly secondary to her severe anxiety (including the paranoia).  She has good insight into this but is struggling to use skills she used to use for the anxiety.  The klonopin and alcohol does not help this.      Anxiety  Work the program  Stop klonopin and alcohol which pt feels she can do  Increase hydroxyzine to 25-50 mg tid (instead of 25 mg tid) prn to help with stopping klonopin  Most likely will increase venlafaxine XR to 75 mg next week at our next visit  Will consider increasing zyprexa if needing more immediate relief    Depression  Work the program  Continue medications with an increase in effexor xr most likely happening next week as mentioned above  Stop klonopin and alcohol      Safety Assessment:  Ambar reports suicidal ideation and/or non-suicidal self-injury or thoughts thereof as noted above  Ambar is future-oriented and is engaged in treatment planning   I do not feel that Ambar meets criteria for a 72-hour involuntary hold and remains appropriate for an outpatient level of care    Signed:   Abe Hussein MD   October 9, 2024      Visit Details:  Type of service:  Video  Visit    Start/End Time: see above    Originating Location (pt. Location): Home in [place]    Distant Location (provider location): Provider Remote Setting- Home Office     Platform used for Video Visit: Huong    Physician has received verbal consent for a Video Visit from the patient? Yes    Level of Medical Decision Making:     Discussion of management or test interpretation with external physician/other qualified healthcare professional/appropriate source - programmatic care multidisciplinary treatment team    Chart review as part of intake process includes diagnostic assessment/psychosocial evaluation and any recent updates, as well as recent ED and/or inpatient documentation, where applicable.The reader is referred to those sources for additional information.     82 min spent on the date of the encounter in chart review, patient visit, review of tests, documentation, care coordination, and/or discussion with other providers about the issues documented above.      This document completed in part using Cognection dictation software and therefore may contain inadvertent word or phrase substitutions.

## 2024-10-10 ENCOUNTER — HOSPITAL ENCOUNTER (OUTPATIENT)
Dept: BEHAVIORAL HEALTH | Facility: CLINIC | Age: 49
Discharge: HOME | End: 2024-10-10
Attending: PSYCHIATRY & NEUROLOGY
Payer: COMMERCIAL

## 2024-10-10 PROCEDURE — H0035 MH PARTIAL HOSP TX UNDER 24H: HCPCS

## 2024-10-10 PROCEDURE — H0035 MH PARTIAL HOSP TX UNDER 24H: HCPCS | Performed by: COUNSELOR

## 2024-10-10 PROCEDURE — 90853 GROUP PSYCHOTHERAPY: CPT | Performed by: COUNSELOR

## 2024-10-10 NOTE — GROUP NOTE
Psychoeducation Group Note    PATIENT'S NAME: Ambar Borges  MRN:   [id number]  :   1975  ACCT. NUMBER: [id number]  DATE OF SERVICE: 10/10/24  START TIME:  9:30 AM  END TIME: 10:20 AM  FACILITATOR: Bentley Alcala RN  TOPIC:  Wellness Group: Mental Health Maintenance  [facility] Adult Partial Hospitalization Program  TRACK: [address]    NUMBER OF PARTICIPANTS: 2    Summary of Group / Topics Discussed:  Mental Health Maintenance:  Assessments of Strengths: Patients completed a self-reflection on personal strengths worksheet. The concept of personal strength as it relates to resilience were explored. Patients shared responses with the group and participated in discussion.     Patient Session Goals / Objectives:  Patients identified 1-3 qualities they consider a personal strength.  Patients understood the concept of personal strengths and the connection it has to resiliency      Patient Participation / Response:  Fully participated with the group by sharing personal reflections / insights and openly received / provided feedback with other participants.    Demonstrated understanding of topics discussed through group discussion and participation, Identified / Expressed personal readiness to practice skills, and Verbalized understanding of mental health maintenance topic    Treatment Plan:  Patient has a current master individualized treatment plan.  See Epic treatment plan for more information.    Bentley Alcala RN

## 2024-10-10 NOTE — GROUP NOTE
Process Group Note    PATIENT'S NAME: Ambar Borges  MRN:   [id number]  :   1975  ACCT. NUMBER: [id number]  DATE OF SERVICE: 10/10/24  START TIME: 11:00 AM  END TIME: 11:50 AM  FACILITATOR: Jaren Atwood TH; Osiris Morrison OT  TOPIC:  Process Group    Diagnoses:  KRYSTIN severe  Panic disorder per history  MDD, recurrent moderate with no psychosis  Alcohol use disorder, moderate  Opioid use disorder severe, in remission with suboxone    [facility] Adult Partial Hospitalization Program  TRACK:  [facility]/IOP 2 Combined    NUMBER OF PARTICIPANTS: 7        Data:    Session content: At the start of this group, patients were invited to check in by identifying themselves, describing their current emotional status, and identifying issues to address in this group.   Area(s) of treatment focus addressed in this session included Symptom Management, Personal Safety, and Community Resources/Discharge Planning.  Patient arrived on time for group and engaged with the prompted check-in questions pertaining to emotions, goals, boundaries, barriers, safety concerns, chemical use, medication management, and group feedback, and stated the following:       Patient arrived on time for group and engaged with the prompted check-in questions pertaining to emotions, goals, boundaries, barriers, safety concerns, chemical use, medication management, and group feedback, and stated the following:       Feeling: anxious     Treatment Goal: feel like I hear voices in my head: can t stop it: due to high anxiety - wants to lessen the voices     Skills:      Barriers: if I cannot let control of myself, death  onto sanity     Safety concerns: no     Chemicals: no     Meds: no- forgot this morning; typically, yes     Grateful:  (23 years)      Support: sharing stories     Processing Time: no     Therapeutic Interventions/Treatment Strategies:  Psychotherapist offered support, feedback and validation and reinforced use of  "skills. Treatment modalities used include Motivational Interviewing and Cognitive Behavioral Therapy. Interventions include Cognitive Restructuring:  Explored impact of ineffective thoughts / distortions on mood and activity and Facilitated recognition of the connection between negative thoughts and negative core beliefs, Coping Skills: Facilitated discussion on learning and applying radical acceptance skill, Discussed use of self-soothe skills to decrease distress in the body, Assisted patient in identifying 1-2 healthy distraction skills to reduce overall distress, Discussed how the use of intentional \"in the moment\" actions can help reduce distress, Reviewed patients current calming practices and discussed a more formal way of practicing and accessing skills, and Promoted understanding of how and when to apply grounding strategies to reduce distress and increase presence in the moment, and Symptoms Management: Promoted understanding of their diagnoses and how it impacts their functioning.    Assessment:    Patient response:   Patient responded to session by accepting feedback, giving feedback, listening, focusing on goals, being attentive, and accepting support    Possible barriers to participation / learning include: severity of symptoms    Health Issues:   None reported       Substance Use Review:   Substance Use: No active concerns identified.    Mental Status/Behavioral Observations  Appearance:   Appropriate   Eye Contact:   Good   Psychomotor Behavior: Normal   Attitude:   Cooperative   Orientation:   All  Speech   Rate / Production: Normal    Volume:  Normal   Mood:    Anxious  Normal  Affect:    Appropriate   Thought Content:   Clear  Thought Form:  Coherent  Logical     Insight:    Fair     Plan:   Safety Plan: Committed to safety and agreed to follow previously developed safety coping plan.    Barriers to treatment: my head/myself   Patient Contracts (see media tab):  None  Substance Use: Not addressed " in session   Continue or Discharge: Patient will continue in Adult Partial Hospitalization Program (PHP)  as planned. Patient is likely to benefit from learning and using skills as they work toward the goals identified in their treatment plan.      Jaren Atwood, TH  October 10, 2024

## 2024-10-10 NOTE — ED PROVIDER NOTES
EmPATH Unit - Psychiatric Observation Discharge Summary  [facility]  Discharge Date: 10/10/2024    Ambar Borges MRN: [id number]   Age: 49 year old YOB: 1975     Brief HPI & Initial ED Course     Chief Complaint   Patient presents with    Hallucinations     HPI  Ambar Borges is a 49 year old female with history notable for substance use disorders which have mostly been in remission with the exception of alcohol which she may be using more frequently as of recent.  She is currently under observation status, now approaching 32 hours in the emergency department.  Initial concerns leading to her visit involved confusion with collateral reports indicating suspicion for psychosis involving paranoia and possible hallucinations.  Subjectively, the patient had focused on heightened anxiety and depressed mood in the context of interpersonal conflicts with family members.  Documentation indicates that the patient was placed on a 72-hour hold in the emergency department due to these concerns and later transferred to the EmPATH unit for psychiatric assessment.  Pertinent Labs preceding her arrival include a blood alcohol level of 0.08.  Overnight, there were no acute issues.  Nursing staff note that the patient was hesitant to take her evening medications however eventually took them with prompting.  She was able to sleep in the milieu after taking her medications with the rest of the evening being uneventful.    On examination today, the patient reviewed with me interpersonal conflict that has led to intermittent moments of anxiety.  This has led her to use alcohol on occasion however she does not desire continued usage.  She denies developing dependence or significant tolerance to alcohol.  She admits to difficulty sleeping at night however is relieved that Zyprexa provides her with relief from that concern.  This morning, she is not experiencing any side effects to Zyprexa.  She is  "not interested in taking it regularly however is willing to have the medication on hand in case she is not able to sleep.  She is not seeking antidepressant medications today.  She reports that her appetite is fair.  Energy is improving.  Concentration is adequate.  She is open to a referral for outpatient mental health treatment.  She adamantly denied suicidal and homicidal thoughts.  She denied auditory and visual hallucinations.  She did not endorse any overt delusions or feelings of paranoia.  She is requesting to discharge home.      Physical Examination   BP: 128/84  Pulse: 105  Temp: 98.1  F (36.7  C)  Resp: 18  Height: 165.1 cm (5' 5\")  Weight: 62.1 kg (137 lb)  SpO2: 98 %    Physical Exam  General: Appears stated age.   Neuro: Alert and fully oriented. Extremities appear to demonstrate normal strength on visual inspection.   Integumentary/Skin: no rash visualized, normal color    Psychiatric Examination   Appearance: awake, alert  Attitude:  cooperative  Eye Contact:  fair  Mood:  anxious and better  Affect:  appropriate and in normal range  Speech:  clear, coherent  Psychomotor Behavior:  no evidence of tardive dyskinesia, dystonia, or tics  Thought Process:  logical and linear  Associations:  no loose associations  Thought Content:  no evidence of suicidal ideation or homicidal ideation and no evidence of psychotic thought  Insight:  fair  Judgement:  fair  Oriented to:  time, person, and place  Attention Span and Concentration:  fair  Recent and Remote Memory:  fair  Language: able to name/identify objects without impairment  Fund of Knowledge: intact with awareness of current and past events    Results     ED Course as of 10/10/24 1013   Wed Sep 18, 2024   0634 I obtained history and examined the patient as noted above       Labs Ordered and Resulted from Time of ED Arrival to Time of ED Departure   COMPREHENSIVE METABOLIC PANEL - Abnormal       Result Value    Sodium 139      Potassium 4.3      Carbon " Dioxide (CO2) 22      Anion Gap 16 (*)     Urea Nitrogen 7.3      Creatinine 0.84      GFR Estimate 84      Calcium 9.6      Chloride 101      Glucose 105 (*)     Alkaline Phosphatase 138      AST 20      ALT 23      Protein Total 7.3      Albumin 4.5      Bilirubin Total 0.5     ETHYL ALCOHOL LEVEL - Abnormal    Alcohol ethyl 0.08 (*)    ROUTINE UA WITH MICROSCOPIC - Abnormal    Color Urine Light Yellow      Appearance Urine Clear      Glucose Urine Negative      Bilirubin Urine Negative      Ketones Urine Trace (*)     Specific Gravity Urine 1.013      Blood Urine Negative      pH Urine 5.5      Protein Albumin Urine Negative      Urobilinogen Urine Normal      Nitrite Urine Negative      Leukocyte Esterase Urine Trace (*)     Bacteria Urine Few (*)     Mucus Urine Present (*)     RBC Urine 1      WBC Urine 2      Squamous Epithelials Urine 1     CBC WITH PLATELETS AND DIFFERENTIAL    WBC Count 4.0      RBC Count 4.46      Hemoglobin 13.7      Hematocrit 41.3      MCV 93      MCH 30.7      MCHC 33.2      RDW 11.9      Platelet Count 279      % Neutrophils 61      % Lymphocytes 29      % Monocytes 8      % Eosinophils 2      % Basophils 1      % Immature Granulocytes 0      NRBCs per 100 WBC 0      Absolute Neutrophils 2.4      Absolute Lymphocytes 1.1      Absolute Monocytes 0.3      Absolute Eosinophils 0.1      Absolute Basophils 0.0      Absolute Immature Granulocytes 0.0      Absolute NRBCs 0.0         Observation Course   The patient was found to have a psychiatric condition that would benefit from an observation stay in the emergency department for further psychiatric stabilization and/or coordination of a safe disposition. The plan upon observation admission included serial assessments of psychiatric condition, potential administration of medications if indicated, further disposition pending the patient's psychiatric course during the monitoring period.     Serial assessments of the patient's psychiatric  condition were performed. Nursing notes were reviewed. During the observation period, the patient did not require medications for agitation, and did not require restraints/seclusion for patient and/or provider safety.     After a period of working with the treatment team on the EmPATH unit, the patient's mental state improved to allow a safe transition to outpatient care. After counseling on the diagnosis, work-up, and treatment plan, the patient was discharged. Close follow-up with a psychiatrist and/or therapist was recommended and community psychiatric resources were provided. Patient is to return to the ED if any urgent or potentially life-threatening concerns.     Discharge Diagnoses:   Final diagnoses:   Auditory hallucinations   Mild alcohol use disorder   Psychosis, unspecified psychosis type (H) - r/o delirium   Anxiety       Treatment Plan:  -The patient's altered mental state appears to be resolving and may have been related to a delirium stemming from recent alcohol use in the context of taking prescribed medications which may have compounded the risk for adverse effects: Clonazepam and Suboxone.  Her presentation may have additionally been complicated by several nights of insomnia.  Today, she is not exhibiting symptoms of alcohol withdrawal.  Initial labs were reassuring with transaminases noted to be within normal limits.  Vital signs are stable today.  At this point, the risk of experiencing a complicated withdrawal syndrome should be low.  The patient was educated on the importance of abstaining from alcohol to minimize reoccurring symptoms or additional complications.  -A urinalysis was ordered and the results were reviewed today: Trace leuk esterase raising a small chance for a UTI further complicating her presentation.  Awaiting results of her urine culture which will likely not be back before discharge.  She will follow up with her outpatient provider for the final results.  -A temporary supply  of Zyprexa 5 mg nightly as needed, for either insomnia or mild agitation, will be provided at the time of discharge.  She should follow up with her outpatient providers to aid in diagnostic clarity and to help determine if longer-term treatment would be indicated.  -Resume Suboxone for maintenance treatment of her opiate use disorder  -Resume clonazepam 0.5 mg 3 times a day as needed for reduction of anxiety.  Encouraged conservative usage.  If alcohol use were persist, her outpatient team should reassess to evaluate the risks of continuing this medication versus tapering off.  -Referral for outpatient psychiatric medication management and intensive outpatient programming  -Noting the improvements she has gained in the lack of imminent safety concerns today, there does not appear to be criteria to pursue involuntary commitment.  Therefore, her 72-hour hold will be discontinued.  The patient will be discharged home today and her care will be transitioned back to the outpatient setting.      At the time of discharge, the patient's acute suicide risk was determined to be low due to the following factors: Reduction in the intensity of mood/anxiety symptoms that preceded the admission, denial of suicidal thoughts, denies feeling helpless or helpless, not currently under the influence of alcohol or illicit substances, denies experiencing command hallucinations, no immediate access to firearms. The patient's acute risk could be higher if noncompliant with their treatment plan, medications, follow-up appointments or using illicit substances or alcohol. Protective factors include: social supports, stable housing    I spent more than 30 minutes on discharge day activities.    --  Andre Noe MD  [facility] EMERGENCY DEPT  EmPATH Unit     Andre Noe MD  10/10/24 9317

## 2024-10-10 NOTE — PROGRESS NOTES
Partial Hospitalization Program  Occupational Therapy Evaluation     Patient: Ambar Borges  Preferred Name: Ambar  Pronouns:  She/Her   MRN: [id number]  : 1975  Age: 49 year old  Program start date: 10/10/2024  Anticipated discharge: 10/16/2024    Unable to complete occupational therapy evaluation prior to early discharge from [facility].       Signature: FLAVIA Lipscomb/HARVINDER

## 2024-10-10 NOTE — GROUP NOTE
Psychotherapy Group Note    PATIENT'S NAME: Ambar Borges  MRN:   [id number]  :   1975  ACCT. NUMBER: [id number]  DATE OF SERVICE: 10/10/24  START TIME:  1:00 PM  END TIME:  1:50 PM  FACILITATOR: Jaren Atwood TH  TOPIC:  EBP Group: Emotions Management  [facility]  TRACK: [facility]/IOP 2 Combined    NUMBER OF PARTICIPANTS: 7    Summary of Group / Topics Discussed:  Emotions Management: Understanding Emotions: Patients discussed the purpose of emotions and function they serve in our lives.  Reviewed core emotions, why they happen (triggers), and how they occur. The group assisted one anothers' understanding that: emotional experiences are important; difficult emotions have a place in our lives; and the differences between various emotions.    Patient Session Goals / Objectives:  Demonstrate understanding of types various emotions  Identify and discuss specific emotions and when they occur; understand triggers  Discuss barriers to emotional regulation      Patient Participation / Response:  Fully participated with the group by sharing personal reflections / insights and openly received / provided feedback with other participants.    Demonstrated understanding of topics discussed through group discussion and participation, Expressed understanding of the relevance / importance of emotions management skills at distressing times in life, Self-aware of experiences with difficult emotions, and strategies to employ to manage them, Identified barriers to applying emotions management strategies, Identified strategies to overcome barriers to use of emotions management skills, and Identified emotions management strategies that have helped maintain / improve symptoms in the past    Treatment Plan:  Patient has a current master individualized treatment plan.  See Epic treatment plan for more information.    TASHA Syed

## 2024-10-10 NOTE — ADDENDUM NOTE
Encounter addended by: Bentley Alcala RN on: 10/10/2024 4:03 PM   Actions taken: Clinical Note Signed, Charge Capture section accepted

## 2024-10-10 NOTE — GROUP NOTE
Psychotherapy Group Note    PATIENT'S NAME: Ambar Borges  MRN:   [id number]  :   1975  ACCT. NUMBER: [id number]  DATE OF SERVICE: 10/10/24  START TIME: 12:00 PM  END TIME: 12:50 PM  FACILITATOR: Jaren Atwood TH  TOPIC:  EBP Group: Emotions Management  [facility] Adult Partial Hospitalization Program  TRACK: [place] [facility] 2 Combined    NUMBER OF PARTICIPANTS: 7    Summary of Group / Topics Discussed:  Emotions Management: Guilt and Shame: Patients explored and shared personal experiences associated with feelings of guilt and shame.  Group explored how these feelings develop, what they mean to each individual, and how to increase acceptance and usefulness of these feelings.  Group members assisted one another to contextualize these concepts and promote healing.     Patient Session Goals / Objectives:  Discuss and review definitions and personal views/experiences with guilt and shame  Understand the differences between guilt and shame  Explore how feelings of guilt and shame impact functioning  Understand and practice strategies to manage difficult emotions and move towards healing  Understand and normalize difficult emotions through group discussion  Understand the utility of guilt and shame  Target  unwanted  emotions for change      Patient Participation / Response:  Fully participated with the group by sharing personal reflections / insights and openly received / provided feedback with other participants.    Demonstrated understanding of topics discussed through group discussion and participation, Expressed understanding of the relevance / importance of emotions management skills at distressing times in life, Self-aware of experiences with difficult emotions, and strategies to employ to manage them, Identified barriers to applying emotions management strategies, and Identified strategies to overcome barriers to use of emotions management skills    Treatment Plan:  Patient has a current  master individualized treatment plan.  See Epic treatment plan for more information.    Jaren Atwood, TH

## 2024-10-12 ENCOUNTER — HOSPITAL ENCOUNTER (OUTPATIENT)
Facility: CLINIC | Age: 49
Setting detail: OBSERVATION
Discharge: HOME | End: 2024-10-13
Attending: EMERGENCY MEDICINE | Admitting: EMERGENCY MEDICINE
Payer: COMMERCIAL

## 2024-10-12 DIAGNOSIS — F41.0 PANIC ATTACK: ICD-10-CM

## 2024-10-12 DIAGNOSIS — F33.1 MODERATE EPISODE OF RECURRENT MAJOR DEPRESSIVE DISORDER (H): ICD-10-CM

## 2024-10-12 DIAGNOSIS — F23 ACUTE PSYCHOSIS (H): ICD-10-CM

## 2024-10-12 DIAGNOSIS — F29 PSYCHOSIS, UNSPECIFIED PSYCHOSIS TYPE (H): ICD-10-CM

## 2024-10-12 DIAGNOSIS — F10.90 ALCOHOL USE DISORDER: ICD-10-CM

## 2024-10-12 DIAGNOSIS — F11.91 OPIOID USE DISORDER IN REMISSION: ICD-10-CM

## 2024-10-12 DIAGNOSIS — F41.1 GAD (GENERALIZED ANXIETY DISORDER): ICD-10-CM

## 2024-10-12 LAB
ANION GAP SERPL CALCULATED.3IONS-SCNC: 15 MMOL/L (ref 7–15)
BASOPHILS # BLD AUTO: 0 10E3/UL (ref 0–0.2)
BASOPHILS NFR BLD AUTO: 1 %
BUN SERPL-MCNC: 16.5 MG/DL (ref 6–20)
CALCIUM SERPL-MCNC: 9.1 MG/DL (ref 8.8–10.4)
CHLORIDE SERPL-SCNC: 102 MMOL/L (ref 98–107)
CREAT SERPL-MCNC: 0.71 MG/DL (ref 0.51–0.95)
EGFRCR SERPLBLD CKD-EPI 2021: >90 ML/MIN/1.73M2
EOSINOPHIL # BLD AUTO: 0.1 10E3/UL (ref 0–0.7)
EOSINOPHIL NFR BLD AUTO: 1 %
ERYTHROCYTE [DISTWIDTH] IN BLOOD BY AUTOMATED COUNT: 11.9 % (ref 10–15)
GLUCOSE SERPL-MCNC: 127 MG/DL (ref 70–99)
HCO3 SERPL-SCNC: 23 MMOL/L (ref 22–29)
HCT VFR BLD AUTO: 38.1 % (ref 35–47)
HGB BLD-MCNC: 12.3 G/DL (ref 11.7–15.7)
IMM GRANULOCYTES # BLD: 0 10E3/UL
IMM GRANULOCYTES NFR BLD: 0 %
LYMPHOCYTES # BLD AUTO: 0.8 10E3/UL (ref 0.8–5.3)
LYMPHOCYTES NFR BLD AUTO: 11 %
MCH RBC QN AUTO: 29.3 PG (ref 26.5–33)
MCHC RBC AUTO-ENTMCNC: 32.3 G/DL (ref 31.5–36.5)
MCV RBC AUTO: 91 FL (ref 78–100)
MONOCYTES # BLD AUTO: 0.3 10E3/UL (ref 0–1.3)
MONOCYTES NFR BLD AUTO: 4 %
NEUTROPHILS # BLD AUTO: 6.4 10E3/UL (ref 1.6–8.3)
NEUTROPHILS NFR BLD AUTO: 83 %
NRBC # BLD AUTO: 0 10E3/UL
NRBC BLD AUTO-RTO: 0 /100
PLATELET # BLD AUTO: 256 10E3/UL (ref 150–450)
POTASSIUM SERPL-SCNC: 3.7 MMOL/L (ref 3.4–5.3)
RBC # BLD AUTO: 4.2 10E6/UL (ref 3.8–5.2)
SODIUM SERPL-SCNC: 140 MMOL/L (ref 135–145)
TROPONIN T SERPL HS-MCNC: 14 NG/L
TROPONIN T SERPL HS-MCNC: 9 NG/L
WBC # BLD AUTO: 7.7 10E3/UL (ref 4–11)

## 2024-10-12 PROCEDURE — 85025 COMPLETE CBC W/AUTO DIFF WBC: CPT | Performed by: EMERGENCY MEDICINE

## 2024-10-12 PROCEDURE — 250N000013 HC RX MED GY IP 250 OP 250 PS 637: Performed by: EMERGENCY MEDICINE

## 2024-10-12 PROCEDURE — 93005 ELECTROCARDIOGRAM TRACING: CPT

## 2024-10-12 PROCEDURE — 36415 COLL VENOUS BLD VENIPUNCTURE: CPT | Performed by: EMERGENCY MEDICINE

## 2024-10-12 PROCEDURE — G0378 HOSPITAL OBSERVATION PER HR: HCPCS

## 2024-10-12 PROCEDURE — 250N000013 HC RX MED GY IP 250 OP 250 PS 637: Performed by: STUDENT IN AN ORGANIZED HEALTH CARE EDUCATION/TRAINING PROGRAM

## 2024-10-12 PROCEDURE — 99285 EMERGENCY DEPT VISIT HI MDM: CPT

## 2024-10-12 PROCEDURE — 80048 BASIC METABOLIC PNL TOTAL CA: CPT | Performed by: EMERGENCY MEDICINE

## 2024-10-12 PROCEDURE — 84484 ASSAY OF TROPONIN QUANT: CPT | Performed by: EMERGENCY MEDICINE

## 2024-10-12 RX ORDER — HYDROXYZINE HYDROCHLORIDE 25 MG/1
25 TABLET, FILM COATED ORAL EVERY 4 HOURS PRN
Status: DISCONTINUED | OUTPATIENT
Start: 2024-10-12 | End: 2024-10-13 | Stop reason: HOSPADM

## 2024-10-12 RX ORDER — ONDANSETRON 4 MG/1
4 TABLET, ORALLY DISINTEGRATING ORAL EVERY 6 HOURS PRN
Status: DISCONTINUED | OUTPATIENT
Start: 2024-10-12 | End: 2024-10-13 | Stop reason: HOSPADM

## 2024-10-12 RX ORDER — TRAZODONE HYDROCHLORIDE 50 MG/1
50 TABLET, FILM COATED ORAL
Status: DISCONTINUED | OUTPATIENT
Start: 2024-10-12 | End: 2024-10-13 | Stop reason: HOSPADM

## 2024-10-12 RX ORDER — OLANZAPINE 10 MG/1
10 TABLET, ORALLY DISINTEGRATING ORAL 2 TIMES DAILY PRN
Status: DISCONTINUED | OUTPATIENT
Start: 2024-10-12 | End: 2024-10-12

## 2024-10-12 RX ORDER — OLANZAPINE 10 MG/2ML
10 INJECTION, POWDER, FOR SOLUTION INTRAMUSCULAR 2 TIMES DAILY PRN
Status: DISCONTINUED | OUTPATIENT
Start: 2024-10-12 | End: 2024-10-13 | Stop reason: HOSPADM

## 2024-10-12 RX ORDER — OLANZAPINE 10 MG/1
10 TABLET, ORALLY DISINTEGRATING ORAL ONCE
Status: COMPLETED | OUTPATIENT
Start: 2024-10-12 | End: 2024-10-12

## 2024-10-12 RX ORDER — VENLAFAXINE HYDROCHLORIDE 37.5 MG/1
37.5 CAPSULE, EXTENDED RELEASE ORAL DAILY
Status: DISCONTINUED | OUTPATIENT
Start: 2024-10-13 | End: 2024-10-13 | Stop reason: HOSPADM

## 2024-10-12 RX ORDER — BUPRENORPHINE AND NALOXONE 8; 2 MG/1; MG/1
1 FILM, SOLUBLE BUCCAL; SUBLINGUAL 2 TIMES DAILY PRN
Status: DISCONTINUED | OUTPATIENT
Start: 2024-10-12 | End: 2024-10-13 | Stop reason: HOSPADM

## 2024-10-12 RX ORDER — OLANZAPINE 5 MG/1
5 TABLET, ORALLY DISINTEGRATING ORAL 4 TIMES DAILY PRN
Status: DISCONTINUED | OUTPATIENT
Start: 2024-10-12 | End: 2024-10-13 | Stop reason: HOSPADM

## 2024-10-12 RX ORDER — ACETAMINOPHEN 325 MG/1
650 TABLET ORAL EVERY 4 HOURS PRN
Status: DISCONTINUED | OUTPATIENT
Start: 2024-10-12 | End: 2024-10-13 | Stop reason: HOSPADM

## 2024-10-12 RX ORDER — IBUPROFEN 200 MG
600 TABLET ORAL 2 TIMES DAILY PRN
Status: DISCONTINUED | OUTPATIENT
Start: 2024-10-12 | End: 2024-10-13 | Stop reason: HOSPADM

## 2024-10-12 RX ORDER — CLONAZEPAM 0.5 MG/1
0.5 TABLET ORAL 3 TIMES DAILY PRN
Status: DISCONTINUED | OUTPATIENT
Start: 2024-10-12 | End: 2024-10-13 | Stop reason: HOSPADM

## 2024-10-12 RX ORDER — PROGESTERONE 100 MG/1
200 CAPSULE ORAL AT BEDTIME
Status: DISCONTINUED | OUTPATIENT
Start: 2024-10-12 | End: 2024-10-13 | Stop reason: HOSPADM

## 2024-10-12 RX ORDER — LEVOTHYROXINE SODIUM 75 UG/1
150 TABLET ORAL DAILY
Status: DISCONTINUED | OUTPATIENT
Start: 2024-10-13 | End: 2024-10-13 | Stop reason: HOSPADM

## 2024-10-12 RX ADMIN — OLANZAPINE 10 MG: 10 TABLET, ORALLY DISINTEGRATING ORAL at 13:20

## 2024-10-12 RX ADMIN — OLANZAPINE 10 MG: 10 TABLET, ORALLY DISINTEGRATING ORAL at 20:47

## 2024-10-12 ASSESSMENT — ACTIVITIES OF DAILY LIVING (ADL)
ADLS_ACUITY_SCORE: 37

## 2024-10-12 NOTE — ED PROVIDER NOTES
"  Emergency Department Note      History of Present Illness     Chief Complaint   Insomnia    HPI   Ambar Borges is a 49 year old female with history of anxiety who presents with her  for insomnia. For the past few days, patient has not been getting very much sleep. Her  says that she has history of insomnia and that the less sleep she gets, the more that the \"voices\" in her head talk to her. He says that these voices become more persistent and tell her to harm herself and they make her paranoid. He says that while in the parking garage today, she mentioned jumping off the edge. Last night,  says that the patient was talking about taking their dog's Digoxin because it was a \"COPD medication.\"   ensures that she did not take any of this medicine.  She does not have history of COPD. Ambar says that she tends to ruminate on things and her  says that the less sleep she gets, the more reasons that she tells herself that she can't sleep. Last night, she told her  that she was unable to sleep if anyone else was home, including her  and son. Her symptoms are worse in the mornings.  says that insomnia is new for her in the past six weeks and Hydroxyzine has been her rescue medication but it does not seem to be helping anymore. She also has Olanzapine that she has been taking in the morning.  says that she had been doing well for the past few weeks and yesterday was overall a good day for her. She recently started seeing a psychiatrist and started group therapy this week that went well. No visual hallucinations, suicidal ideations, or homicidal ideations.  says that there is minor alcohol use and the most she will drink is two beers in a night and he monitors her intake. She endorses use of a nicotine vaporizer.     Patient mentions feeling like she is \"having a heart attack\" but  says that this is common for her when she is having a panic " "attack. She is having chest pressure that radiates into her jaw.  says that this is not why there are here today. She has not had a stress test in the past. No history of diabetes mellitus, hypertension, or MI.     Independent Historian   , Mason, as above.    Review of External Notes     Reviewed psychiatry note from 10/9/24 with Dr. Hussein. Also reviewed ED notes from 9/15 with Dr. Raymond, 9/18 with Dr. Morgan, and 8/14 with Dr. Cerna.     Past Medical History     Medical History and Problem List   Chronic low back pain  Primary thyroid cancer   Hypothyroidism  Hirsutism  Oligomenorrhea   Arthritis  Asthma  Chronic alcoholic liver disease  Fibromyalgia  Lichen sclerosus  MDD   Opioid dependence   Panic disorder  Spinal stenosis  Testosterone deficiency   Anxiety   DDD, lumbar   Insomnia      Medications   Suboxone   Hydrocodone-acetaminophen  Synthroid   Methadone HCl  Progesterone  Zoloft  Zanaflex  Ultram   Klonopin      Surgical History   Hemorrhoidectomy, external, single column/group  Septoplasty   Tooth extraction   Mammoplasty augmentation  Total thyroidectomy and selective neck dissection  Dilation and curettage     Physical Exam     Patient Vitals for the past 24 hrs:   BP Temp Temp src Pulse Resp SpO2 Height Weight   10/12/24 1605 100/58 -- -- 69 12 95 % -- --   10/12/24 1330 (!) 159/91 -- -- 88 11 99 % -- --   10/12/24 1245 (!) 156/92 -- -- 106 12 100 % -- --   10/12/24 1224 138/55 97  F (36.1  C) Oral 88 16 98 % 1.651 m (5' 5\") 61.2 kg (135 lb)     Physical Exam  General:  Sitting on bed.  HENT:  No obvious trauma to head  Right Ear:  External ear normal.   Left Ear:  External ear normal.   Nose:  Nose normal.   Eyes:  Conjunctivae and EOM are normal. Pupils are equal, round, and reactive.   Neck: Normal range of motion. Neck supple. No tracheal deviation present.   CV:  Normal heart sounds. No murmur heard.  Pulm/Chest: Effort normal and breath sounds normal. No wheezing.  Abd: Soft. No " distension. There is no tenderness. There is no rigidity, no rebound and no guarding.   M/S: Normal range of motion. No calf pain or swelling.  Neuro: Awake and alert.   Skin: Skin is warm and dry. No rash noted. Not diaphoretic.   Psych: Flat affect, calm     Diagnostics     Lab Results   Labs Ordered and Resulted from Time of ED Arrival to Time of ED Departure   BASIC METABOLIC PANEL - Abnormal       Result Value    Sodium 140      Potassium 3.7      Chloride 102      Carbon Dioxide (CO2) 23      Anion Gap 15      Urea Nitrogen 16.5      Creatinine 0.71      GFR Estimate >90      Calcium 9.1      Glucose 127 (*)    TROPONIN T, HIGH SENSITIVITY - Normal    Troponin T, High Sensitivity 9     TROPONIN T, HIGH SENSITIVITY - Normal    Troponin T, High Sensitivity 14     CBC WITH PLATELETS AND DIFFERENTIAL    WBC Count 7.7      RBC Count 4.20      Hemoglobin 12.3      Hematocrit 38.1      MCV 91      MCH 29.3      MCHC 32.3      RDW 11.9      Platelet Count 256      % Neutrophils 83      % Lymphocytes 11      % Monocytes 4      % Eosinophils 1      % Basophils 1      % Immature Granulocytes 0      NRBCs per 100 WBC 0      Absolute Neutrophils 6.4      Absolute Lymphocytes 0.8      Absolute Monocytes 0.3      Absolute Eosinophils 0.1      Absolute Basophils 0.0      Absolute Immature Granulocytes 0.0      Absolute NRBCs 0.0       Imaging   No orders to display     EKG   ECG results from 10/12/24   EKG 12-lead, tracing only     Value    Systolic Blood Pressure     Diastolic Blood Pressure     Ventricular Rate 90    Atrial Rate 90    MN Interval 154    QRS Duration 92        QTc 474    P Axis 76    R AXIS 78    T Axis 76    Interpretation ECG      Sinus rhythm  Normal ECG      Independent Interpretation   None    ED Course      Medications Administered   Medications   OLANZapine zydis (zyPREXA) ODT tab 10 mg (10 mg Oral $Given 10/12/24 1320)     Procedures   None      Discussion of Management   None    ED Course   ED  Course as of 10/12/24 1719   Sat Oct 12, 2024   1248 I evaluated the patient, obtained history, and performed a physical exam as detailed above.    1716  has gone home. Patient now resting comfortably on recheck.   1718 Called pts  and updated him.     Additional Documentation  None    Medical Decision Making / Diagnosis     CMS Diagnoses: None    MIPS   None    MDM   Ambar Borges is a very pleasant 49 year old female who presents with concern of her mental health.  The patient arrives with her .  The patient does have some underlying mental health challenges.  She has had increased anxiety and insomnia.  When she does not sleep well, her mental health deteriorates.  The  reports that she has had some delusions about having COPD and almost took their dogs digoxin medicine, but did not.  When initially asked the patient why she is here she mentioned having chest pain, but the  reports that she had not mentioned that in the past.  He reports that when she has such profound insomnia that she becomes delusional.  Given her initial concern, a medical workup was done including the EKG and serial troponins which are all negative.  This does not sound cardiac.  I doubt ACS or non-STEMI.  She is not short of breath or hypoxic and has no risk factors for pulmonary embolism.  Lungs are clear bilateral.  No wheezing to suggest bronchospasm.  No cough or coarse breath sounds to suggest pneumonia.  No lack of lung sounds to suggest pneumothorax.  I do not believe imaging of the chest is necessary at this time.  Remainder of her labs are unremarkable.  The patient is provided Zyprexa which helped.  The  reports that even coming to the ED she was very impulsive and about jumped over the edge of a parking garage.  The patient is not actively suicidal.  Given her impulsiveness and delusions, I did place her on a 72-hour hold for her own wellbeing.  I reviewed this with her and the   and he is in agreement.  [facility]  Care of the patient is signed out to my evening colleague.    Disposition   [facility]     Diagnosis     ICD-10-CM    1. Acute psychosis (H)  F23            Discharge Medications   New Prescriptions    No medications on file     Scribe Disclosure:  Ana HARMON, am serving as a scribe at 1:29 PM on 10/12/2024 to document services personally performed by Indra Caraballo DO based on my observations and the provider's statements to me.        Indra Caraballo DO  10/12/24 0331

## 2024-10-12 NOTE — ED TRIAGE NOTES
Pt is having insomnia and actin erratically   Pt was brought in by her    Pt has had panic attacks

## 2024-10-13 VITALS
BODY MASS INDEX: 22.84 KG/M2 | TEMPERATURE: 98.1 F | HEART RATE: 84 BPM | SYSTOLIC BLOOD PRESSURE: 144 MMHG | WEIGHT: 137.1 LBS | RESPIRATION RATE: 16 BRPM | DIASTOLIC BLOOD PRESSURE: 88 MMHG | OXYGEN SATURATION: 99 % | HEIGHT: 65 IN

## 2024-10-13 LAB
ATRIAL RATE - MUSE: 90 BPM
DIASTOLIC BLOOD PRESSURE - MUSE: NORMAL MMHG
INTERPRETATION ECG - MUSE: NORMAL
P AXIS - MUSE: 76 DEGREES
PR INTERVAL - MUSE: 154 MS
QRS DURATION - MUSE: 92 MS
QT - MUSE: 388 MS
QTC - MUSE: 474 MS
R AXIS - MUSE: 78 DEGREES
SYSTOLIC BLOOD PRESSURE - MUSE: NORMAL MMHG
T AXIS - MUSE: 76 DEGREES
VENTRICULAR RATE- MUSE: 90 BPM

## 2024-10-13 PROCEDURE — G0378 HOSPITAL OBSERVATION PER HR: HCPCS

## 2024-10-13 PROCEDURE — 250N000013 HC RX MED GY IP 250 OP 250 PS 637: Performed by: NURSE PRACTITIONER

## 2024-10-13 PROCEDURE — 99238 HOSP IP/OBS DSCHRG MGMT 30/<: CPT

## 2024-10-13 PROCEDURE — 250N000013 HC RX MED GY IP 250 OP 250 PS 637: Performed by: PSYCHIATRY & NEUROLOGY

## 2024-10-13 RX ORDER — OLANZAPINE 5 MG/1
5 TABLET ORAL DAILY PRN
Qty: 7 TABLET | Refills: 0 | Status: SHIPPED | OUTPATIENT
Start: 2024-10-13 | End: 2024-10-15

## 2024-10-13 RX ADMIN — CLONAZEPAM 0.5 MG: 0.5 TABLET ORAL at 03:47

## 2024-10-13 RX ADMIN — LEVOTHYROXINE SODIUM 150 MCG: 75 TABLET ORAL at 08:08

## 2024-10-13 RX ADMIN — B-COMPLEX W/ C & FOLIC ACID TAB 1 TABLET: TAB at 08:09

## 2024-10-13 RX ADMIN — TRAZODONE HYDROCHLORIDE 50 MG: 50 TABLET ORAL at 03:47

## 2024-10-13 RX ADMIN — VENLAFAXINE HYDROCHLORIDE 37.5 MG: 37.5 CAPSULE, EXTENDED RELEASE ORAL at 08:09

## 2024-10-13 RX ADMIN — NICOTINE POLACRILEX 4 MG: 4 GUM, CHEWING BUCCAL at 03:51

## 2024-10-13 ASSESSMENT — ACTIVITIES OF DAILY LIVING (ADL)
ADLS_ACUITY_SCORE: 37
HYGIENE/GROOMING: INDEPENDENT

## 2024-10-13 ASSESSMENT — COLUMBIA-SUICIDE SEVERITY RATING SCALE - C-SSRS
TOTAL  NUMBER OF INTERRUPTED ATTEMPTS SINCE LAST CONTACT: NO
1. SINCE LAST CONTACT, HAVE YOU WISHED YOU WERE DEAD OR WISHED YOU COULD GO TO SLEEP AND NOT WAKE UP?: NO
TOTAL  NUMBER OF ABORTED OR SELF INTERRUPTED ATTEMPTS SINCE LAST CONTACT: NO
6. HAVE YOU EVER DONE ANYTHING, STARTED TO DO ANYTHING, OR PREPARED TO DO ANYTHING TO END YOUR LIFE?: NO
2. HAVE YOU ACTUALLY HAD ANY THOUGHTS OF KILLING YOURSELF?: NO
ATTEMPT SINCE LAST CONTACT: NO
SUICIDE, SINCE LAST CONTACT: NO

## 2024-10-13 NOTE — CONSULTS
Diagnostic Evaluation Consultation  Crisis Assessment    Patient Name: Ambar Borges  Age:  49 year old  Legal Sex: female  Gender Identity: female  Pronouns:   Race: White  Ethnicity: Not  or   Language: English      Patient was assessed: In person   Crisis Assessment Start Date: 10/12/24  Crisis Assessment Start Time: 1845  Crisis Assessment Stop Time: 1925  Patient location: [facility] EMERGENCY DEPT                             EMP02    Referral Data and Chief Complaint  Ambar Borges presents to the ED with family/friends. Patient is presenting to the ED for the following concerns: Anxiety, Paranoia.   Factors that make the mental health crisis life threatening or complex are:  Patient presented to the ED with concerns related to anxiety and psychosis involving paranoia and possible auditory hallucinations. Pt's  brought her to the ED. He stated that she grew paranoid during the ride to the ED, accusing  of trying to kill her in the hospital. She made a comment about wanting to jump off of the hospital parking ramp barrier. She was placed on a 72 hour hold upon arrival to the ED due to this stated and erratic behaviors. Pt was administered 10mg olanzapine at 1320 and was observed to be sleeping comfortably shortly after. Writer met with patient at 1845. At the time of assessment, she had just finished eating most of her dinner. She reported having slept well and no longer hearing voices. She stated that she had not sleeping for several days and was hearing voices, which she described as the voices of her son and . She described these voices as having started off as ruminating and intrusive thoughts due to feelings of guilt of feeling like a burden on her family. She denied suicidal and homicidal ideations. She denied engaging in self-harm behaviors..      Informed Consent and Assessment Methods  Explained the crisis assessment process, including  "applicable information disclosures and limits to confidentiality, assessed understanding of the process, and obtained consent to proceed with the assessment.  Assessment methods included conducting a formal interview with patient, review of medical records, collaboration with medical staff, and obtaining relevant collateral information from family and community providers when available.  : done     Patient response to interventions: acceptance expressed, verbalizes understanding  Coping skills were attempted to reduce the crisis:  Pt appears to be help seeking.     History of the Crisis   Patient is a 49 year old female with a history notable for thyroid cancer, thyroidectomy, unspecified psychosis, and mild alcohol use disorder. Pt was seen at this ED two previous times on 9/15 and 24 for similar presentation as today's in the context pf insomnia and increased substance use. Pt stated that she is an \"alcoholic,\" with no history of CD treatment. She currently drinks alcohol in moderation, with some beers throughout the week in the evening. Her last drink was 1.5 can of beer last night at 6 pm. She stated that she significantly cut down on alcohol since  on this year. She has been experiencing insomnia intermittently since then as well, which she acknowledged how sleep has been disrupted due to alcohol. Pt reported multiple psychosocial stressors including childhood trauma, and getting laid off from work last year. Her grandfather, who she was close with also  last year. She reported struggling with \"sudden panic attacks\" since the loss of her grandfather and employment. Her  works from home and has been supporting patient during this year. He manages her medications and helps her with medical and mental health outpatient appointments. Pt started [facility] on Thursday. She missed Friday's programming, but plans to resume on Monday. She also has an addictions therapist and an OP " "psychiatrist.    Brief Psychosocial History  Family:  , Children yes (25 year old son)  Support System:    Employment Status:  unemployed (Laid off one year ago. Pt used to work in a respiratory clinic designing software)  Source of Income:  none  Financial Environmental Concerns:  none (finanially supported by )  Current Hobbies:  gardening, television/movies/videos, social media/computer activities  Barriers in Personal Life:  mental health concerns    Significant Clinical History  Current Anxiety Symptoms:  panic attack, anxious, racing thoughts, excessive worry  Current Depression/Trauma:  negativistic, sense of doom, impaired decision making, excessive guilt  Current Somatic Symptoms:     Current Psychosis/Thought Disturbance:  forgetful, distractability  Current Eating Symptoms:  loss of appetite, weight loss  Chemical Use History:  Alcohol: Social  Last Use:: 10/11/24  Benzodiazepines: None  Opiates: None  Cocaine: None  Marijuana: None  Other Use: None   Past diagnosis:  Anxiety Disorder, Depression, Substance Use Disorder  Family history:  No known history of mental health or chemical health concerns  Past treatment:  Individual therapy, Psychiatric Medication Management, Primary Care  Details of most recent treatment:  Pt started [facility] on Thursday and plans to resume on Monday. She also has an addictions therapist and an OP psychiatrist.  Other relevant history:  No history of CD treatment, reports managing on her own.       Collateral Information  Is there collateral information: Yes     Collateral information name, relationship, phone number:  Mason (Spouse)  [phone] (Mobile)    What happened today: Patient's insomnia for this episode started on Tuesday. It started with her having ruminating thoughts that seemed to stem from feelings of guilt. She then started hearing \"voices,\" which she described as voices of family (Mason, their son, and Mason's parents). Last night, " when Mason was administering medications to their dog, pt stated that the voices told her the medications are for actually for COPD, and that she should be taking them. Pt does not have COPD. Pt's paranoia started today on the way to the hospital. She accused her  of cheating on her and trying to kill her in the hospital. She made a comment about wanting to jump off of the hospital parking ramp barrier, which would have been a 15 feet drop. Two hours after she was given medication in the ED, she became reality oriented and expressed remorse for her behavior.     What is different about patient's functioning: Since leaving [facility] one month ago, pt had been doing really well. She was less depressed, gardened a lot, and attended [facility], therapy, and psychiatry. She has been drinking alcohol minimally. Even though she has been improving, she suddenly became anxious and hasn't been able to sleep. She takes hydroxyzine and olanzapine PRN, which were effective at first, but not as of late.       Has patient made comments about wanting to kill themselves/others: no (Mason stated that pt's comment about wanting to jump off of the parking ramp was due to fear and paranoia, not suicidal.)    If d/c is recommended, can they take part in safety/aftercare planning: yes    Additional collateral information:  Mason feels that although she is reality oriented now, she could still benefit from more sleep and observation at the [facility] unit.     Risk Assessment  [place] Suicide Severity Rating Scale Full Clinical Version:  Suicidal Ideation  Q6 Suicide Behavior (Lifetime): no          [place] Suicide Severity Rating Scale Recent:   Suicidal Ideation (Recent)  Q1 Wished to be Dead (Past Month): no  Q2 Suicidal Thoughts (Past Month): no  Level of Risk per Screen: no risks indicated  Intensity of Ideation (Recent)  Deterrents (Past 1 Month): Does not apply  Reasons for Ideation (Past 1 Month): Does not apply  Suicidal Behavior  (Recent)  Actual Attempt (Past 3 Months): No  Has subject engaged in non-suicidal self-injurious behavior? (Past 3 Months): No  Interrupted Attempts (Past 3 Months): No  Aborted or Self-Interrupted Attempt (Past 3 Months): No  Preparatory Acts or Behavior (Past 3 Months): No    Environmental or Psychosocial Events: unemployment/underemployment, ongoing abuse of substances, other life stressors  Protective Factors: Protective Factors: strong bond to family unit, community support, or employment, intact marriage or domestic partnership, lives in a responsibly safe and stable environment, responsibilities and duties to others, including pets and children, sense of importance of health and wellness, help seeking, supportive ongoing medical and mental health care relationships, optimistic outlook - identification of future goals, sense of self-efficacy and/or positive self-esteem, good treatment engagement, cultural, spiritual , or Taoism beliefs associated with meaning and value in life, constructive use of leisure time, enjoyable activities, resilience    Does the patient have thoughts of harming others? Feels Like Hurting Others: no  Previous Attempt to Hurt Others: no  Current presentation:  (calm)  Is the patient engaging in sexually inappropriate behavior?: no    Is the patient engaging in sexually inappropriate behavior?  no        Mental Status Exam   Affect: Appropriate  Appearance: Appropriate  Attention Span/Concentration: Attentive  Eye Contact: Engaged    Fund of Knowledge: Appropriate   Language /Speech Content: Fluent  Language /Speech Volume: Normal  Language /Speech Rate/Productions: Normal  Recent Memory: Intact  Remote Memory: Intact  Mood: Normal  Orientation to Person: Yes   Orientation to Place: Yes  Orientation to Time of Day: Yes  Orientation to Date: Yes     Situation (Do they understand why they are here?): Yes  Psychomotor Behavior: Normal  Thought Content: Clear  Thought Form: Goal  "Directed          Medication  Psychotropic medications:   Medication Orders - Psychiatric (From admission, onward)      Start     Dose/Rate Route Frequency Ordered Stop    10/13/24 0800  venlafaxine (EFFEXOR XR) 24 hr capsule 37.5 mg         37.5 mg Oral DAILY 10/12/24 2108      10/12/24 2106  traZODone (DESYREL) tablet 50 mg         50 mg Oral AT BEDTIME PRN 10/12/24 2106      10/12/24 2105  OLANZapine zydis (zyPREXA) ODT tab 5 mg        Placed in \"Or\" Linked Group    5 mg Oral 4 TIMES DAILY PRN 10/12/24 2106      10/12/24 2105  OLANZapine (zyPREXA) injection 10 mg        Placed in \"Or\" Linked Group    10 mg Intramuscular 2 TIMES DAILY PRN 10/12/24 2106      10/12/24 2004  hydrOXYzine HCl (ATARAX) tablet 25 mg         25 mg Oral EVERY 4 HOURS PRN 10/12/24 2004               Current Care Team  Patient Care Team:  No Ref-Primary, Physician as PCP - General    Diagnosis  Patient Active Problem List   Diagnosis Code    Malignant neoplasm of thyroid gland (H) C73    Postsurgical hypothyroidism E89.0    Hirsutism L68.0    Oligomenorrhea N91.5    Anxiety F41.9    Major depression F32.9    Brief psychotic disorder (H) F23    Auditory hallucinations R44.0    Acute psychosis (H) F23    Alcoholic intoxication without complication (H) F10.920    Psychosis, unspecified psychosis type (H) F29       Primary Problem This Admission  Active Hospital Problems    Psychosis, unspecified psychosis type (H) F29      *Anxiety F41.9      Clinical Summary and Substantiation of Recommendations   Patient is a 49 year old female with a history notable for anxiety, unspecified psychosis, and mild alcohol use disorder who presented to the ED with concerns related to anxiety and psychosis involving paranoia and possible auditory hallucinations. She has been struggling with insomnia for the last four nights. She reported drinking alcohol in moderation. Her last drink was 1.5 can of beer last night at 6 pm. Pt was reported to have become reality " oriented two hours after Zyprexa was administered. At the time of assessment, pt reported having slept well while in the ED and is no longer hearing voices. When pt's  drove her to the ED, pt made a comment about wanting to jump off of the hospital parking ramp barrier, which would have been a 15 feet drop. Pt stated that this was due to a heightened state of paranoia, and was not a suicidal comment. She denied suicidal ideations and homicidal ideations. Pt initially requested to discharge home tonight, however after hearing her 's concerns, she acknowledged the importance of getting more sleep. She reported feeling drowsy currently and is agreeable to transfer to [facility] for observation. Her  expressed concern that pt's hydroxyzine and olanzapine PRN are no longer effective. Pt recently started [facility], which she is planning to resume on Monday. She has upcoming appointments with  established OP therapy and psychiatry. Pt and her  actively engaged in safety planning.                          Patient coping skills attempted to reduce the crisis:  Pt appears to be help seeking.    Disposition  Recommended disposition: Individual Therapy, Medication Management, Programmatic Care, Observation        Reviewed case and recommendations with attending provider. Attending Name: N/A pending Psych consult       Attending concurs with disposition:  (n/a)       Patient and/or validated legal guardian concurs with disposition: yes       Final disposition:  observation    Legal status on admission: Voluntary/Patient has signed consent for treatment    Assessment Details   Total duration spent with the patient: 45 min     CPT code(s) utilized: 24108 - Psychotherapy for Crisis - 60 (30-74*) min    RYAN Gregg, Psychotherapist  DEC - Triage & Transition Services  Callback: [phone]

## 2024-10-13 NOTE — PROGRESS NOTES
"Triage and Transition Services Extended Care Reassessment     Patient: Ambar goes by \"Ambar,\" uses she/her pronouns  Date of Service: 2024  Site of Service: [facility] EMERGENCY DEPT                             EMP02  Patient was seen yes  Mode of Assessment: In person     Reason for Reassessment:  (Daily reassessment as part of EmPATH protocol.)    History of Patient's Original Emergency Room Encounter: Patient is a 49 year old female with a history notable for thyroid cancer, thyroidectomy, unspecified psychosis, and mild alcohol use disorder. Pt was seen at this ED two previous times on 9/15 and 24 for similar presentation as today's in the context pf insomnia and increased substance use. Pt stated that she is an \"alcoholic,\" with no history of CD treatment. She currently drinks alcohol in moderation, with some beers throughout the week in the evening. Her last drink was 1.5 can of beer last night at 6 pm. She stated that she significantly cut down on alcohol since  on this year. She has been experiencing insomnia intermittently since then as well, which she acknowledged how sleep has been disrupted due to alcohol. Pt reported multiple psychosocial stressors including childhood trauma, and getting laid off from work last year. Her grandfather, who she was close with also  last year. She reported struggling with \"sudden panic attacks\" since the loss of her grandfather and employment. Her  works from home and has been supporting patient during this year. He manages her medications and helps her with medical and mental health outpatient appointments. Pt started [facility] on Thursday. She missed Friday's programming, but plans to resume on Monday. She also has an addictions therapist and an OP psychiatrist.    Current Patient Presentation: Pt is reporting that she is feeling much better and wants to discharge.    Presentation Summary: Pt reported that she is " "feeling much better today, like \"night and day\". She is glad that she is socializing while on the unit, explaining that last time she just isolated and ruminated while here. She explained that's he felt overwhelmed yesterday with house projects and felt that she let her  down. She hasn't been able to sleep much since starting her IOP program as she has felt overwhelmed with information and has needed to process this. She remembered the paranoia she experienced yesterday regarding her  and denies current paranoia. She feels confident in her relationship at this time. In regards to hearing voices yesterday, pt shared that her voices are related to anxiety and she wants to try to not have them \"control her\". Pt shared that she feels that her current IOP program in \"amazing\" and the work that is being done at [facility] is \"groundbreaking\". She reported that she got very good sleep last night and feels ready to discharge to home and IOP today. She denied current AH and VH as well as current SI, SIB and HI.    Changes Observed Since Initial Assessment: decrease in presenting symptoms    Therapeutic Interventions Provided: Engaged in safety planning, Reviewed healthy living that supports positive mental health, including looking at sleep hygiene, regular movement, nutrition, and regular socialization., Provided positive reinforcement for progress towards goals, gains in knowledge, and application of skills previously taught., Explored strategies for self-soothing., Explored and identified early warning signs to mental health crisis. Explored motivation for discharge.    Current Symptoms: anxious        Mental Status Exam   Affect: Appropriate  Appearance: Appropriate  Attention Span/Concentration: Attentive  Eye Contact: Engaged    Fund of Knowledge: Appropriate   Language /Speech Content: Fluent  Language /Speech Volume: Normal  Language /Speech Rate/Productions: Normal  Recent Memory: Intact  Remote Memory: " "Intact  Mood: Normal  Orientation to Person: Yes   Orientation to Place: Yes  Orientation to Time of Day: Yes  Orientation to Date: Yes     Situation (Do they understand why they are here?): Yes  Psychomotor Behavior: Normal  Thought Content: Clear  Thought Form: Intact, Goal Directed    Treatment Objective(s) Addressed: rapport building, identifying an appropriate aftercare plan, identifying and practicing coping strategies, processing feelings, assessing safety, identifying treatment goals, safety planning    Patient Response to Interventions: acceptance expressed, verbalizes understanding    Progress Towards Goals:  Patient Reports Symptoms Are: improving  Patient Progress Toward Goals: is making progress  Comment: Pt has been receptive to ED interventions.  Next Step to Work Toward Discharge: follow up on referrals  Symptom Stabilization Comment: Pt will discharge today.    Case Management: Summary of Interaction: None today. Pt is in IOP in will return to this tomorrow.    C-SSRS Since Last Contact:   1. Wish to be Dead (Since Last Contact): No  2. Non-Specific Active Suicidal Thoughts (Since Last Contact): No     Actual Attempt (Since Last Contact): No  Has subject engaged in non-suicidal self-injurious behavior? (Since Last Contact): No  Interrupted Attempts (Since Last Contact): No  Aborted or Self-Interrupted Attempt (Since Last Contact): No  Preparatory Acts or Behavior (Since Last Contact): No  Suicide (Since Last Contact): No     Calculated C-SSRS Risk Score (Since Last Contact): No Risk Indicated    Plan: Final Disposition / Recommended Care Path: discharge  Plan for Care reviewed with assigned Medical Provider: yes  Plan for Care Team Review: provider, RN  Comments: EVGENY Mike  Patient and/or validated legal guardian concurs: yes  Clinical Substantiation: Pt reported that she is feeling much better today, like \"night and day\". She is glad that she is socializing while on the unit, explaining " "that last time she just isolated and ruminated while here. She explained that's he felt overwhelmed yesterday with house projects and felt that she let her  down. She hasn't been able to sleep much since starting her IOP program as she has felt overwhelmed with information and has needed to process this. She remembered the paranoia she experienced yesterday regarding her  and denies current paranoia. She feels confident in her relationship at this time. In regards to hearing voices yesterday, pt shared that her voices are related to anxiety and she wants to try to not have them \"control her\". Pt shared that she feels that her current IOP program in \"amazing\" and the work that is being done at [facility] is \"groundbreaking\". She reported that she got very good sleep last night and feels ready to discharge to home and IOP today. She denied current AH and VH as well as current SI, SIB and HI.          After the period in observation care, the patient's circumstances and mental state were safe for outpatient management.  After therapeutic treatment, intervention and aftercare planning by [facility] care team and consultation with attending provider, the patient was discharged. Close follow-up with a psychiatrist and/or therapist was recommended and community psychiatric resources were provided. Patient is to return to the ED if any urgent or potentially life-threatening concerns arise.           At the time of discharge, the patient's acute suicide risk was determined to be low due to the following factors: reduction in the intensity of mood/anxiety symptoms that preceded the admission, denial of suicidal thoughts, denies feeling helpless or hopeless, not currently under the influence of alcohol or illicit substances, denies experiencing command hallucinations, and no immediate access to firearms. Protective factors include: voluntarily seeking mental health support, established relationship community mental " health provider(s), future focused thinking, displays insight, expresses desire to engage in treatment, sense of obligation to people/pets, and safe/stable housing.    Legal Status: Legal Status at Admission: Voluntary/Patient has signed consent for treatment    Session Status: Time session started: 1014  Time session ended: 1034  Session Duration (minutes): 20 minutes  Session Number: 1  Anticipated number of sessions or this episode of care: 1    Session Start Time: 1014  Session Stop Time: 1034  CPT codes: 41510 - Psychotherapy (with patient) - 30 (16-37*) min  Time Spent: 20 minutes      CPT code(s) utilized: 39975 - Psychotherapy (with patient) - 30 (16-37*) min    Diagnosis:   Patient Active Problem List   Diagnosis Code    Malignant neoplasm of thyroid gland (H) C73    Postsurgical hypothyroidism E89.0    Hirsutism L68.0    Oligomenorrhea N91.5    Anxiety F41.9    Major depression F32.9    Brief psychotic disorder (H) F23    Auditory hallucinations R44.0    Acute psychosis (H) F23    Alcoholic intoxication without complication (H) F10.920    Psychosis, unspecified psychosis type (H) F29       Primary Problem This Admission: Active Hospital Problems    Psychosis, unspecified psychosis type (H)      Acute psychosis (H)      *Anxiety        Maryam Virgen   Licensed Mental Health Professional (LMHP), [facility]  [phone]

## 2024-10-13 NOTE — ED NOTES
Discharge instructions reviewed with patient including follow-up care plan. Educated on medication regime and advised not to stop prescribed medication without consulting their physician.Will  meds at her outpatient pharmacy. Reviewed safety plan and outpatient resources/appointments.Verbalized understanding of discharge instructions. Denies SI. All belongings which where brought into the hospital have been returned to patient. Escorted off the unit @ 1300 with staff.     Discharged to home with family.

## 2024-10-13 NOTE — ED NOTES
[facility]  ED to EMPATH Checklist:      Goal for EMPATH: Time to stabilize    Current Behavior: Cooperative    Safety Concerns: Impulsive behaviors (reason for ED visit)    Legal Hold Status: 72 Hrs    Medically Cleared by ED provider: Yes    Patient Therapeutically Searched: Therapeutic search by ED staff (strings, belts, shoes, pockets, electronics, etc.)    Belongings: Sealed and put in locker per unit protocol    Independent Ambulation at Baseline: Yes/No: Yes    Participates in Care/Conversation: Yes/No: Yes    Patient Informed about EMPATH: Yes/No: Yes    DEC: Ordered and completed    Patient Ready to be Transferred to EMPATH? Yes/No: Yes

## 2024-10-13 NOTE — PROGRESS NOTES
"Pt was brought over to [facility] from ED16. Pt was initially brought in to ED for acute psychosis. Pt was placed on 72hr hold due to eratic behaviours and making statements to her  such as \"jumping off the edge\". Pt also complained of insomnia and panic attacks. She reports taking her medications as prescribed. Pt slept on ED 16 for about 6hr prior to transfer to [facility]. Upon arrival to [facility] pt was calm and cooperative. Pt denied any SI or HI. She denies any AV/H. 72hr was discontinued.    Nursing and risk assessments completed. Assessments reviewed with LMHP and physician. Admission information reviewed with patient. Patient given a tour of [facility] and instructions on using the facility. [facility]. Pt safety search completed.    "

## 2024-10-13 NOTE — PLAN OF CARE
Ambar SANDY Jony  October 12, 2024  Plan of Care Hand-off Note     Patient Care Path: observation    Plan for Care:   Patient is a 49 year old female with a history notable for anxiety, unspecified psychosis, and mild alcohol use disorder who presented to the ED with concerns related to anxiety and psychosis involving paranoia and possible auditory hallucinations. She has been struggling with insomnia for the last four nights. She reported drinking alcohol in moderation. Her last drink was 1.5 can of beer last night at 6 pm. Pt was reported to have become reality oriented two hours after Zyprexa was administered. At the time of assessment, pt reported having slept well while in the ED and is no longer hearing voices. When pt's  drove her to the ED, pt made a comment about wanting to jump off of the hospital parking ramp barrier, which would have been a 15 feet drop. Pt stated that this was due to a heightened state of paranoia, and was not a suicidal comment. She denied suicidal ideations and homicidal ideations. Pt initially requested to discharge home tonight, however after hearing her 's concerns, she acknowledged the importance of getting more sleep. She reported feeling drowsy currently and is agreeable to transfer to [facility] for observation. Her  expressed concern that pt's hydroxyzine and olanzapine PRN are no longer effective. Pt recently started [facility], which she is planning to resume on Monday. She has upcoming appointments with  established OP therapy and psychiatry. Pt and her  actively engaged in safety planning.        Overview:  Mason Borges (Spouse)  [phone] (Mobile)            Legal Status: Legal Status at Admission: Voluntary/Patient has signed consent for treatment    Psychiatry Consult: pending       Updated RN  regarding plan of care.           RYAN Gregg

## 2024-10-13 NOTE — ED PROVIDER NOTES
"EmPATH Unit - Psychiatry  Combined Observation Note and Discharge Summary  [facility] Emergency Department  Observation Initiation Date: Oct 12, 2024    Ambar Bogres MRN: [id number]   Age: 49 year old YOB: 1975     History     Chief Complaint   Patient presents with    Insomnia     HPI  Ambar Borges is a 49 year old female with a past history notable for depression, anxiety, panic disorder, opioid use disorder, and alcohol use disorder who presented to the ED on 10/12/24 with concerns for worsening anxiety, insomnia, paranoia, and possible auditory hallucinations. Patient reported she has been having difficulty sleeping x 4 days and had become increasingly paranoid about her  leaving her and trying to kill her by bringing her to the hospital. Upon initial assessment in the ED, patient made comments about jumping off the hospital parking ramp and was initially placed under a 72 hour hold, this was later discontinued following reassessment. Patient received olanzapine in the ED, slept, and was reported to appear significantly improved upon waking with resolution of paranoia. In the emergency department, this patient was determined to be medically stable and transferred to the EmPATH unit for psychiatric assessment.  Record [facility] and is currently participating in programmatic care at [facility]. They have currently been in the emergency department for 24 hours. Overnight there were no acute concerns.     Ambar was agreeable to meet with me and tells me she feels \"much better today.\" She denies SI/HI and denies AH/VH. She adamantly denies intent behind the statements made in the ED about jumping off of the parking garage, she explains she was fearful she would be held at the hospital triggering her to say this. She tells me that she started programmatic care last week and while she perceives this to be helpful, she found it " "very overwhelming leading to intensification of anxiety and decreased sleep. When anxious she explains that she \"over thinks everything\" and becomes \"hyper fixated on things.\" At time she starts to worry that her family will leave her. At times she misinterprets things she sees and thinks people are saying derogatory things about her.  She did not attend programming on Friday due to elevated anxiety. She continues to drink 1-2 beers or glasses of wine a day, she reports this is significantly decreased compared to previous drinking. She notes medication compliance with Effexor XR which was started 2 weeks ago. She has been utilizing hydroxyzine and klonopin for anxiety and panic. She found olanzapine to be quite helpful in the ED and asks if she can have a supply of this to discharge with should paranoia return. She does not desire any additional medication changes. She is requesting to discharge and follow-up with established providers and return to programmatic care tomorrow. Discussed importance of following up with programmatic care and established supports for ongoing observation and diagnostic clarity.       Past Medical History  Past Medical History:   Diagnosis Date    Chronic low back pain     Primary thyroid cancer (H)      Past Surgical History:   Procedure Laterality Date    HC HEMORROIDECTOMY, EXTERNAL, SINGLE COLUMN/GROUP  2006    HC REPAIR OF NASAL SEPTUM  2008    HC TOOTH EXTRACTION W/FORCEP  2010    nerve damage to sensation of lower jaw    MAMMOPLASTY AUGMENTATION  2004    THYROIDECTOMY  6/1/2012    Procedure:THYROIDECTOMY; Total Thyroidectomy and Selective Neck Dissection; Surgeon:KELSEY DONOVAN; Location:[facility] OR     B Complex Vitamins (VITAMIN  B COMPLEX) tablet  buprenorphine HCl-naloxone HCl (SUBOXONE) 8-2 MG per film  clonazePAM (KLONOPIN) 0.5 MG tablet  ibuprofen (ADVIL) 200 MG capsule  levothyroxine (SYNTHROID, LEVOTHROID) 150 MCG tablet  OLANZapine (ZYPREXA) 5 MG tablet  progesterone " "(PROMETRIUM) 100 MG capsule  Turmeric (QC TUMERIC COMPLEX PO)  venlafaxine (EFFEXOR XR) 37.5 MG 24 hr capsule      No Known Allergies  Family History  No family history on file.  Social History   Social History     Substance Use Topics    Alcohol use: Yes     Comment: Infrequently    Drug use: No          Review of Systems  A medically appropriate review of systems was performed with pertinent positives and negatives noted in the HPI, and all other systems negative.    Physical Examination   BP: 138/55  Pulse: 88  Temp: 97  F (36.1  C)  Resp: 16  Height: 165.1 cm (5' 5\")  Weight: 61.2 kg (135 lb)  SpO2: 98 %    Physical Exam  General: Appears stated age.   Neuro: Alert and fully oriented. Extremities appear to demonstrate normal strength on visual inspection.   Integumentary/Skin: no rash visualized, normal color    Psychiatric Examination   Appearance: awake, alert, adequately groomed, appeared as age stated, and casually dressed  Attitude:  cooperative  Eye Contact:  good  Mood:   \"I'm great\"  Affect:  appropriate and in normal range and mood congruent  Speech:  clear, coherent and normal prosody  Psychomotor Behavior:  no evidence of tardive dyskinesia, dystonia, or tics and intact station, gait and muscle tone  Thought Process:  logical and linear  Associations:  no loose associations  Thought Content:  no evidence of suicidal ideation or homicidal ideation and no evidence of psychotic thought  Insight:  fair  Judgement:  fair  Oriented to:  time, person, and place  Attention Span and Concentration:  fair  Recent and Remote Memory:  fair  Language: able to name/identify objects without impairment  Fund of Knowledge: intact with awareness of current and past events    ED Course     ED Course as of 10/13/24 1253   Sat Oct 12, 2024   1248 I evaluated the patient, obtained history, and performed a physical exam as detailed above.    1716  has gone home. Patient now resting comfortably on recheck.   1718 " Called pts  and updated him.       Labs Ordered and Resulted from Time of ED Arrival to Time of ED Departure   BASIC METABOLIC PANEL - Abnormal       Result Value    Sodium 140      Potassium 3.7      Chloride 102      Carbon Dioxide (CO2) 23      Anion Gap 15      Urea Nitrogen 16.5      Creatinine 0.71      GFR Estimate >90      Calcium 9.1      Glucose 127 (*)    TROPONIN T, HIGH SENSITIVITY - Normal    Troponin T, High Sensitivity 9     TROPONIN T, HIGH SENSITIVITY - Normal    Troponin T, High Sensitivity 14     CBC WITH PLATELETS AND DIFFERENTIAL    WBC Count 7.7      RBC Count 4.20      Hemoglobin 12.3      Hematocrit 38.1      MCV 91      MCH 29.3      MCHC 32.3      RDW 11.9      Platelet Count 256      % Neutrophils 83      % Lymphocytes 11      % Monocytes 4      % Eosinophils 1      % Basophils 1      % Immature Granulocytes 0      NRBCs per 100 WBC 0      Absolute Neutrophils 6.4      Absolute Lymphocytes 0.8      Absolute Monocytes 0.3      Absolute Eosinophils 0.1      Absolute Basophils 0.0      Absolute Immature Granulocytes 0.0      Absolute NRBCs 0.0     HCG QUALITATIVE URINE       Assessments & Plan (with Medical Decision Making)   Patient presenting with elevated anxiety, insomnia, and concerns for symptoms of psychosis including paranoia and hallucinations. Patient has previous dx of depression, anxiety, panic disorder, alcohol use disorder, and opioid use disorder (in remission) Nursing notes reviewed noting no acute issues. Following additional observation in the ED and olanzapine, patient was able to obtain sleep and symptoms have resolved. Denies SI/HI and AH/VH. Discussed how current presentation and symptoms exacerbation could be due to elevated anxiety compounded by lack of sleep yet continuing to work with outpatient supports will further aid in diagnostic clarity. Patient is requesting to discharge with prescription for olanzapine noting current and past perceived benefit.  Patient to discharge and follow-up with established outpatient supports and programmatic care.    I have reviewed the assessment completed by the [facility].     During the observation period, the patient did not require medications for agitation, and did not require restraints/seclusion for patient and/or provider safety.    The patient was found to have a psychiatric condition that would benefit from an observation stay in the emergency department for further psychiatric stabilization and/or coordination of a safe disposition. The observation plan includes serial assessments of psychiatric condition, potential administration of medications if indicated, further disposition pending the patient's psychiatric course during the monitoring period.     Preliminary diagnosis:    ICD-10-CM    1. Acute psychosis (H)  F23       2. Psychosis, unspecified psychosis type (H)  F29       3. KRYSTIN (generalized anxiety disorder)  F41.1       4. Panic attack  F41.0       5. Alcohol use disorder  F10.90       6. Opioid use disorder in remission  F11.91     taking suboxone      7. Moderate episode of recurrent major depressive disorder (H)  F33.1            Treatment Plan:  -Olanzapine 5mg daily as needed for acute anxiety, agitation, paranoia  -Continue Effexor XR 37.5mg for anxiety, consider additional dose optimization-patient requests to defer to established OP providers  -Continue hydroxyzine 25-50mg three times daily as needed for anxiety, patient reports she is transitioning off of Klonopin   -Continue all other PTA medications   -Medication education provided this visit including but not limited to: Rationale for medication, importance of medication adherence, medication interactions, common medication side effects, benefits of medications.  -Anticipate resuming outpatient medication management appointments. Psychotherapy, and programmatic care  -Problem focused supportive therapy and education provided today related to patient's  current and acute stressors, symptoms, and diagnoses.  -Patient is requesting to discharge and at time of assessment patient does not meet criteria for involuntary hold     After the period in observation care, the patient's circumstances and mental state were safe for outpatient management. After counseling on the diagnosis, work-up, and treatment plan, the patient was discharged. Close follow-up with a psychiatrist and/or therapist was recommended and community psychiatric resources were provided. Patient is to return to the ED if any urgent or potentially life-threatening concerns.      At the time of discharge, the patient's acute suicide risk was determined to be low due to the following factors: Reduction in the intensity of mood/anxiety symptoms that preceded the admission, denial of suicidal thoughts, denies feeling helpless or helpless, not currently under the influence of alcohol or illicit substances, denies experiencing command hallucinations, no immediate access to firearms. The patient's acute risk could be higher if noncompliant with their treatment plan, medications, follow-up appointments or using illicit substances or alcohol. Protective factors include: social supports, children, stable housing    --  EVGENY Mike CNP   [facility] EMERGENCY DEPT  EmPATH Unit        Delmis Guerrero APRN CNP  10/13/24 [id number]

## 2024-10-13 NOTE — ED NOTES
Pt not been able to sleep the first half of the night. She's been mostly awake and up. Pt did receive PRN Klonopin for her anxiety and Trazadone for sleep after 3 am and was able to rest in her chair. No signs of distress or acute issues noted.

## 2024-10-14 ENCOUNTER — HOSPITAL ENCOUNTER (OUTPATIENT)
Dept: BEHAVIORAL HEALTH | Facility: CLINIC | Age: 49
Discharge: HOME | End: 2024-10-14
Attending: PSYCHIATRY & NEUROLOGY
Payer: COMMERCIAL

## 2024-10-14 ENCOUNTER — TELEPHONE (OUTPATIENT)
Dept: BEHAVIORAL HEALTH | Facility: CLINIC | Age: 49
End: 2024-10-14
Payer: COMMERCIAL

## 2024-10-14 ENCOUNTER — PATIENT OUTREACH (OUTPATIENT)
Dept: CARE COORDINATION | Facility: CLINIC | Age: 49
End: 2024-10-14
Payer: COMMERCIAL

## 2024-10-14 VITALS
RESPIRATION RATE: 17 BRPM | DIASTOLIC BLOOD PRESSURE: 68 MMHG | SYSTOLIC BLOOD PRESSURE: 137 MMHG | OXYGEN SATURATION: 100 % | HEART RATE: 77 BPM

## 2024-10-14 PROCEDURE — H0035 MH PARTIAL HOSP TX UNDER 24H: HCPCS | Performed by: COUNSELOR

## 2024-10-14 PROCEDURE — H0035 MH PARTIAL HOSP TX UNDER 24H: HCPCS

## 2024-10-14 NOTE — GROUP NOTE
Psychoeducation Group Note    PATIENT'S NAME: Ambar Borges  MRN:   [id number]  :   1975  ACCT. NUMBER: [id number]  DATE OF SERVICE: 10/14/24  START TIME: 12:00 PM  END TIME: 12:50 PM  FACILITATOR: Osiris Morrison OT  TOPIC: MH Life Skills Group: Sensory Approaches in Mental Health  [facility] Adult Partial Hospitalization Program  TRACK: [facility]    NUMBER OF PARTICIPANTS: 3    Summary of Group / Topics Discussed:  Sensory Approaches to Mental Health: Relaxation: Patients were provided with verbal and experiential education to identify physical and sensorimotor based activities that can be utilized for relaxation, stress management, self-care, health maintenance, and self-regulation.  Patients went through an experiential practice of different relaxation skills and identified skills they can use in their daily life to decrease anxiety and promote relaxation. Patients increased knowledge and awareness of activities that support relaxation, build resiliency, and prevent relapse through healthy engagement in mindful focused activities.     Patient Session Goals / Objectives:   Identified specific physical and sensorimotor based activities for relaxation.   Improved awareness of activities that are meaningful focused activities that support relaxation.   Established a plan for practice of these skills in their own environments.   Practiced and reflected on how to generalize taught skills to their everyday life.        Patient Participation / Response:  Fully participated with the group by sharing personal reflections / insights and openly received / provided feedback with other participants.    Demonstrating understand of content through actively participating in relaxation techniques including square breathing, mental grounding, mindfulness, 5-4-3-2-1, body scanning, and progressive muscle relaxation (PMR). Patient's affect was appropriate to situation and mood congruent.        Treatment Plan:  Patient  has an initial individualized treatment plan that was created as part of their diagnostic assessment / admission process.  A master individualized treatment plan is in the process of being developed with the patient and multi-disciplinary care team.    Osiris Morrison OT

## 2024-10-14 NOTE — GROUP NOTE
Process Group Note    PATIENT'S NAME: Ambar Borges  MRN:   [id number]  :   1975  ACCT. NUMBER: [id number]  DATE OF SERVICE: 10/14/24  START TIME: 11:00 AM  END TIME: 11:50 AM  FACILITATOR: Jaren Atwood TH; Bentley Alcala RN  TOPIC:  Process Group    Diagnoses:  Principal DSM5 Diagnoses (Sustained by DSM5 Criteria Listed Above): 296.32 (F33.1) Major Depressive Disorder, Recurrent Episode, Moderate _ and With mixed features     [facility] Adult Partial Hospitalization Program  TRACK: [facility]    NUMBER OF PARTICIPANTS: 3        Data:    Session content: At the start of this group, patients were invited to check in by identifying themselves, describing their current emotional status, and identifying issues to address in this group.   Area(s) of treatment focus addressed in this session included Symptom Management, Personal Safety, and Community Resources/Discharge Planning.  Patient arrived on time for group and engaged with the prompted check-in questions pertaining to emotions, goals, boundaries, barriers, safety concerns, chemical use, medication management, and group feedback, and stated the following:  Pt reported feeling overwhelmed and anxious.  Pt reported getting acclimated to group.  Pt reported using coping skills of grounding.  Pt reported barriers include negative thought patterns.  Pt reported no safety concerns.  Pt denied SI/HI/SIB.  Pt reported taking medications as prescribed.  Pt reported being grateful for the group.  Pt reported not needing process time.      Therapeutic Interventions/Treatment Strategies:  Psychotherapist offered support, feedback and validation and reinforced use of skills. Treatment modalities used include Motivational Interviewing and Cognitive Behavioral Therapy. Interventions include Cognitive Restructuring:  Explored impact of ineffective thoughts / distortions on mood and activity, Assisted patient in formulating new neutral/positive  "alternatives to challenge less helpful / ineffective thoughts, and Facilitated recognition of the connection between negative thoughts and negative core beliefs, Coping Skills: Facilitated discussion on learning and applying radical acceptance skill, Discussed use of self-soothe skills to decrease distress in the body, Assisted patient in identifying 1-2 healthy distraction skills to reduce overall distress, Discussed how the use of intentional \"in the moment\" actions can help reduce distress, and Reviewed patients current calming practices and discussed a more formal way of practicing and accessing skills, Mindfulness: Facilitated discussion of when/how to use mindfulness skills to benefit general health, mental health symptoms, and stressors and Encouraged a plan to use mindfulness skills in daily life, and Symptoms Management: Promoted understanding of their diagnoses and how it impacts their functioning.    Assessment:    Patient response:   Patient responded to session by accepting feedback, giving feedback, listening, focusing on goals, being attentive, and accepting support    Possible barriers to participation / learning include: severity of symptoms    Health Issues:   None reported       Substance Use Review:   Substance Use: No active concerns identified.    Mental Status/Behavioral Observations  Appearance:   Appropriate   Eye Contact:   Good   Psychomotor Behavior: Normal   Attitude:   Cooperative   Orientation:   All  Speech   Rate / Production: Normal    Volume:  Normal   Mood:    Anxious  Depressed  Normal  Affect:    Appropriate   Thought Content:   Clear  Thought Form:  Coherent  Logical     Insight:    Good     Plan:   Safety Plan: No current safety concerns identified.  Recommended that patient call 911 or go to the local ED should there be a change in any of these risk factors.   Barriers to treatment:  Pt reported barriers include negative thought patterns.   Patient Contracts (see media tab):  " None  Substance Use: Not addressed in session   Continue or Discharge: Patient will continue in Adult Partial Hospitalization Program (PHP)  as planned. Patient is likely to benefit from learning and using skills as they work toward the goals identified in their treatment plan.      Jaren Atwood, TH October 14, 2024

## 2024-10-14 NOTE — GROUP NOTE
Psychotherapy Group Note    PATIENT'S NAME: Ambar Borges  MRN:   [id number]  :   1975  ACCT. NUMBER: [id number]  DATE OF SERVICE: 10/14/24  START TIME:  1:00 PM  END TIME:  1:50 PM  FACILITATOR: Megan Lyon [facility]; Bentley Alcala RN; Osiris Morrison OT; Jaren Atwood, TH  TOPIC:  EBP Group: Social Support  [facility] Adult Partial Hospitalization Program  TRACK: [facility]    NUMBER OF PARTICIPANTS: 3    Summary of Group / Topics Discussed:  Social Support:   Meeting: Patients and their support system participated in a session on creating family and community-based support.  The importance and role of family/social support was stressed to increase the effectiveness of treatment. Patients and their support system were given information on PHP structure, diagnoses served within the program, in addition to key goals and objectives. Additionally, physical symptoms, thoughts, and behaviors commonly observed in psychopathology were presented and discussed.  Lastly, information was presented on ways to help promote and support recovery.  The patients and their support system benefitted from this session by working through an exercise designed to clarify their needs for assistance and support in order to enhance their recovery. Staff helped each clarify their roles, so that the patients can be in charge of their stabilization and recovery.      Patient Session Goals / Objectives:  Normalize aspects of mental health conditions and mental illness  Provide education on  normal  vs.  abnormal  aspects of mental health  Clarify the role of partial hospitalization programming  Reduce stigma associated with mental health diagnosis (es).  Provide social support system with techniques and strategies to improve communication and enhance empathy.  Improve interpersonal communication regarding symptoms  Build a sense of mastery and self-respect      Patient Participation /  Response:  Fully participated with the group by sharing personal reflections / insights and openly received / provided feedback with other participants.    {OP MH PROGRESS NOTE PHP SOCIAL SUPPORT:111131    Treatment Plan:  Patient has a current master individualized treatment plan.  See Epic treatment plan for more information.    Jaren Atwood, TH

## 2024-10-14 NOTE — PROGRESS NOTES
Acknowledgement of Current Treatment Plan       I have reviewed my treatment plan with my treatment team members.  I agree with the plan as it is written in the electronic health record.     Name:                   Signature:                                                          Date:  Ambar Hussein MD  Psychiatrist/Medical Director

## 2024-10-14 NOTE — GROUP NOTE
Psychoeducation Group Note    PATIENT'S NAME: Ambar Borges  MRN:   [id number]  :   1975  ACCT. NUMBER: [id number]  DATE OF SERVICE: 10/14/24  START TIME:  8:30 AM  END TIME:  9:20 AM  FACILITATOR: Bentley Alcala RN  TOPIC:  Wellness Group: Mind/Body Practice & Complementary  [facility] Adult Partial Hospitalization Program  TRACK: [facility]   NUMBER OF PARTICIPANTS: 3    Summary of Group / Topics Discussed:  Mind/Body Practice & Complementary Therapies:  Complementary Therapies: Patients were educated on a variety of complementary therapies that can be used in conjunction with psychotherapy, pharmacotherapy, & other treatments or independently to promote holistic wellness and symptom reduction. Complementary therapies addressed included: acupuncture, chiropractic, meditation, aromatherapy, massage, spiritual/energy healing, yoga & other mind/body practices.    Patient Session Goals / Objectives:  Defined what a complementary therapy is and why it can be beneficial in conjunction or independently of other treatment modalities   Explained how the various complementary therapies are practiced and what are the expected effects  Practiced complementary therapy experiential       Patient Participation / Response:  Fully participated with the group by sharing personal reflections / insights and openly received / provided feedback with other participants.    Demonstrated understanding of topics discussed through group discussion and participation, Identified / Expressed personal readiness to practice skills, and Verbalized understanding of Mind/Body Practice & Complementary Therapies topic    Treatment Plan:  Patient has a current master individualized treatment plan.  See Epic treatment plan for more information.    Bentley Alcala RN

## 2024-10-14 NOTE — GROUP NOTE
Psychoeducation Group Note    PATIENT'S NAME: Ambar Borges  MRN:   [id number]  :   1975  ACCT. NUMBER: [id number]  DATE OF SERVICE: 10/14/24  START TIME:  9:30 AM  END TIME: 10:20 AM  FACILITATOR: Bentley Alcala RN  TOPIC:  Wellness Group: [facility] Adult Partial Hospitalization Program  TRACK: [facility]    NUMBER OF PARTICIPANTS: 3    Summary of Group / Topics Discussed:  [facility]:  [facility]: Neuroplasticity     Patient Session Goals / Objectives:  Understand what is Neuroplasticity  Effects of knowing Neuroplasticity  Know behaviors that can promote Neuroplasticity          Patient Participation / Response:  Fully participated with the group by sharing personal reflections / insights and openly received / provided feedback with other participants.    Demonstrated understanding of topics discussed through group discussion and participation, Identified / Expressed personal readiness to practice skills, and Verbalized understanding of this topic    Treatment Plan:  Patient has a current master individualized treatment plan.  See Epic treatment plan for more information.    Bentley Alcala RN

## 2024-10-15 ENCOUNTER — HOSPITAL ENCOUNTER (OUTPATIENT)
Dept: BEHAVIORAL HEALTH | Facility: CLINIC | Age: 49
Discharge: HOME | End: 2024-10-15
Attending: PSYCHIATRY & NEUROLOGY
Payer: COMMERCIAL

## 2024-10-15 ENCOUNTER — TELEPHONE (OUTPATIENT)
Dept: BEHAVIORAL HEALTH | Facility: CLINIC | Age: 49
End: 2024-10-15

## 2024-10-15 DIAGNOSIS — F29 PSYCHOSIS, UNSPECIFIED PSYCHOSIS TYPE (H): Primary | ICD-10-CM

## 2024-10-15 PROCEDURE — 99214 OFFICE O/P EST MOD 30 MIN: CPT | Performed by: PSYCHIATRY & NEUROLOGY

## 2024-10-15 RX ORDER — OLANZAPINE 5 MG/1
5 TABLET ORAL 2 TIMES DAILY
Qty: 7 TABLET | Refills: 0 | Status: SHIPPED | OUTPATIENT
Start: 2024-10-15

## 2024-10-15 RX ORDER — OLANZAPINE 5 MG/1
5 TABLET ORAL 2 TIMES DAILY
Qty: 60 TABLET | Refills: 1 | Status: SHIPPED | OUTPATIENT
Start: 2024-10-15

## 2024-10-15 NOTE — PROGRESS NOTES
"[facility]   Adult Mental Health Outpatient Programs  Psychiatric Progress Record    Program Track: [facility]    PATIENT'S NAME: Ambar Borges  MRN:   [id number]  :   1975  ACCT. NUMBER: [id number]  DATE OF SERVICE: 10/15/24    Interval History:  \"I am doing better than a few days ago.\" Ambar presents today for follow-up and ongoing program supervision.   Endorses:  Hearing voices that are strong currently.  Worse at night b/c by herself  She admits to drinking 1-2 drinks a night (suspect that it is more)  Taking 1.5 tabs of klonopin a day  Was in [facility] for a night due to return of severe hallucinations and paranoia 10/12-10/13  Pt denies SI now but states did have some thoughts when the sxs were at their worst prior to [facility]  Today finding it hard to focus in groups due to voices telling her negative things about herself  She was told by [facility] to take zyprexa prn, but she is taking nightly.  It is hard to know for sure of the compliance due to her being distracted today.      Review of Symptoms  Sleep: poor prior to [facility], last 2 nights ok  Appetite: ok  Suicidal ideation: denies current or recent suicidal ideation or behavior  Thoughts of non-suicidal self-injury: denied  Recent self-injurious behavior: denied  Homicidal ideation: denied  Other safety concerns: denied    Activities of Daily Living and Related Systems Impacted by Illness:  Hygiene: ok  Socialization: poor  Activities of Daily Living: (cleaning, shopping, bills, etc.): poor  Concerns related to work: jaleel    Substance use:  States drinking 1-2 drinks, wonder if minimizing    Response to Program Interventions:  Likes program, feels support is good and skills are good to learn, but struggling to focus due to symptoms    Medications:  Current Outpatient Medications   Medication Sig Dispense Refill    OLANZapine (ZYPREXA) 5 MG tablet Take 1 tablet (5 mg) by mouth 2 times daily. 7 tablet 0    OLANZapine (ZYPREXA) 5 MG tablet Take 1 " "tablet (5 mg) by mouth 2 times daily. 60 tablet 1    B Complex Vitamins (VITAMIN  B COMPLEX) tablet Take 1 tablet by mouth daily.      buprenorphine HCl-naloxone HCl (SUBOXONE) 8-2 MG per film Place 1 Film under the tongue 2 times daily as needed (symptoms).      clonazePAM (KLONOPIN) 0.5 MG tablet Take 0.5 mg by mouth 3 times daily as needed for anxiety.      ibuprofen (ADVIL) 200 MG capsule Take 600 mg by mouth 2 times daily as needed for mild pain.      levothyroxine (SYNTHROID, LEVOTHROID) 150 MCG tablet Take 1 tablet by mouth daily. 90 tablet 3    progesterone (PROMETRIUM) 100 MG capsule Take 200 mg by mouth at bedtime.      Turmeric (QC TUMERIC COMPLEX PO) Take 1 capsule by mouth daily.      venlafaxine (EFFEXOR XR) 37.5 MG 24 hr capsule Take 37.5 mg by mouth daily.         The above list was reviewed and updated in EPIC with patient today.     Patient is not taking medications as prescribed (suspect this is the case but no proof) and denies adverse effects    Laboratory Results:  Most recent labs reviewed. Pertinent updates/findings: None.     Mental Status Examination:  Vital Signs: [facility] 03/15/2012    Appearance: adequately groomed, appears stated age, and in moderate distress.  Attitude: cooperative   Eye Contact: fair   Muscle Strength and Tone: normal  Psychomotor Behavior: restless and fidgety   Gait and Station: normal width, turn, arm swing  Speech: clear, coherent, regular rate, regular rhythm, and fluent  Associations: No loosening of associations  Thought Process: coherent, goal directed, and perseverating  Thought Content: no evidence of suicidal ideation or homicidal ideation, auditory hallucinations present, no visual hallucinations present, and patient appears to be responding to internal stimuli  Mood: \"anxious\"  Affect: mood congruent  Insight: fair  Judgment: fair, adequate for safety  Impulse Control: fair  Oriented to: time, place, person, and situation  Attention Span and Concentration: " Normal  Language: Intact  Recent and Remote Memory: Delayed & immediate recall intact  Fund of Knowledge/Assessment of Intelligence: Average  Capacity of Activities of Daily Living: Independent, able to participate in programmatic care services.    Diagnosis/es:    ICD-10-CM    1. Psychosis, unspecified psychosis type (H)  F29 OLANZapine (ZYPREXA) 5 MG tablet        KRYSTIN severe  Panic disorder per history  MDD, recurrent severe with psychosis  Alcohol use disorder, moderate  Opioid use disorder, severe, in remission with suboxone        Assessment/Plan:  Ambar presents today for follow-up psychiatric evaluation and assessment of progress. Endorses off and on worsening symptoms.  Pt still drinking and taking klonopin which is most likely worsening symptoms.  She states she is taking zyprexa every night, but wonder if this is the case.  It did not appear to be the case per the [facility] documentation.  Once she gets a good night sleep, no alcohol and gets zyprexa, the symptoms are much improved.      Alcohol use  Overall unchanged   Pt needs to abstain from alcohol to be able to help treat her hallucinations, depression and anxiety  She sees her addiction provider tomorrow.  Encouraged her to ask about possible medications to help with the alcohol use  She was positive for THC at first [facility] visit, she denies use now and most recent [facility] visit did not have a utox.    Hallucinations  Overall worse  Will increase zyprexa to 5 mg bid.  This should help with both hallucinations and anxiety    Anxiety  Overall worse  Increasing zyprexa  Tapering off klonopin to 1 tab a day for 3 days and then 1/2 tab for 3 days and then stop.  Concerned that combo of benzo and alcohol is dangerous and making symptoms worse.  The was explained to the patient who states she understands. Also increased risk of combo of alcohol, benzo and suboxone.  Suspect this is part of the reason symptoms are not improving.    Depression  Overall  unchanged  Continue effexor xr with plan to increase but need to get improved symptoms from hallucination standpoint and substance free in order to increase dose and expect to see improvements    Safety Assessment:  Ambar reports suicidal ideation and/or non-suicidal self-injury or thoughts thereof as noted above  Ambar is future-oriented and is engaged in treatment planning   I do not feel that Ambar meets criteria for a 72-hour involuntary hold and remains appropriate for an outpatient level of care    Abe Hussein MD on 10/15/2024 at 11:22 AM    Visit Details:  Type of service: In-person  Location (patient and provider): [facility] Adult Mental Health Outpatient Programmatic Care Offices    Level of Medical Decision Making:   - *HIGH ACUITY* - At least 1 chronic problem with severe exacerbation, progression, or side effects of treatment  - Moderate (or greater) risk of harm to self or others  - Functional impairment that could lead to serious consequences  Discussion of management or test interpretation with external physician/other qualified healthcare professional/appropriate source - programmatic care multidisciplinary treatment team    **Time documented must exclude any time separately billed (e.g. add on therapy time)**:[id number]}     This document completed in part using "[employer] dictation software and therefore may contain inadvertent word or phrase substitutions.

## 2024-10-16 ENCOUNTER — TELEPHONE (OUTPATIENT)
Dept: BEHAVIORAL HEALTH | Facility: CLINIC | Age: 49
End: 2024-10-16
Payer: COMMERCIAL

## 2024-10-16 NOTE — PROGRESS NOTES
Pt and her  met with this writer, Osiris Story, Pierre Alcala, and Jaren Duran. Pt and her  reported that pt was experiencing voices that told her to discharge from the program, leading to her phone call last night requesting discharge. Pt's  expressed concerns about pt's medications and let staff know that an HENRI was signed so that staff could communicate with pt's outside prescriber and addiction specialist. Writer informed pt that because she requested to discharge, the spot has already been filled. Writer explained that if there is not another opening by the end of the day, pt will be added to the waitlist and she can readmit when there is another opening.

## 2024-10-16 NOTE — TELEPHONE ENCOUNTER
----- Message from Osiris Morrison sent at 10/16/2024  2:31 PM CDT -----  Regarding: Patient will be discharging from [facility] today. Please remove from DANIELLA.  Patient will be discharging from [facility] today. Please remove from DANIELLA.

## 2024-10-18 ENCOUNTER — TELEPHONE (OUTPATIENT)
Dept: BEHAVIORAL HEALTH | Facility: CLINIC | Age: 49
End: 2024-10-18
Payer: COMMERCIAL

## 2024-10-18 NOTE — TELEPHONE ENCOUNTER
----- Message from Megan Lyon sent at 10/18/2024 12:03 PM CDT -----  Regarding: Pt Starting [facility] on 10/21  Adult Mental Health Programmatic Care Schedule Request  Patient Name: Ambar Borges  Location of programming: [facility]  Start Date: 10/21    Adult Program Group: Adult Program Group: Adult Program Group: Adult Program Group: Adult Program Group: [facility] [[id number]]  Schedule:  M, T, W, TH, F  8:30AM - 2:00PM  25 hours per week for 3 weeks  Number of visits to be scheduled: 15 days    Attending Provider (MD):  Dr. Juan Pablo Marie ([place]); Dr. Abe Hussein ([place])  Visit Type:  In-Person    Accommodations Needed: n/a  Alerts Identified/Substantiation: n/a  Consulted with Supervisor: Megan Eubanks     Send to:   [[facility] ([id number])] ##ONLY if Start Date is determined##  [facility]'s (BEH BEHAVIORAL OUTPATIENT ASSESSMENTS [30339])   Cora Sweeney ([facility])  Krysten Alfredo (PHP)  Megan Lyon/Raimundo ([facility]: PHP & IOP)

## 2024-10-21 ENCOUNTER — HOSPITAL ENCOUNTER (OUTPATIENT)
Dept: BEHAVIORAL HEALTH | Facility: CLINIC | Age: 49
Discharge: HOME | End: 2024-10-21
Attending: PSYCHIATRY & NEUROLOGY
Payer: COMMERCIAL

## 2024-10-21 PROCEDURE — H0035 MH PARTIAL HOSP TX UNDER 24H: HCPCS | Performed by: COUNSELOR

## 2024-10-21 PROCEDURE — H0035 MH PARTIAL HOSP TX UNDER 24H: HCPCS

## 2024-10-21 PROCEDURE — 99215 OFFICE O/P EST HI 40 MIN: CPT | Performed by: PSYCHIATRY & NEUROLOGY

## 2024-10-21 PROCEDURE — G2211 COMPLEX E/M VISIT ADD ON: HCPCS | Performed by: PSYCHIATRY & NEUROLOGY

## 2024-10-21 NOTE — GROUP NOTE
Psychotherapy Group Note    PATIENT'S NAME: Ambar Borges  MRN:   [id number]  :   1975  ACCT. NUMBER: [id number]  DATE OF SERVICE: 10/21/24  START TIME: 12:00 PM  END TIME: 12:50 PM  FACILITATOR: Megan Lyon LPCC  TOPIC: MH EBP Group: Relationship Skills  [facility] Adult Partial Hospitalization Program  TRACK: [facility]    NUMBER OF PARTICIPANTS: 4    Summary of Group / Topics Discussed:  Relationship Skills: Relationship Mapping: Patients identified different types of relationships they have in their life and examined if there is conflict or closeness, the degree of conflict or closeness, and the reason for conflict. The goal of this topic is to help patients gain awareness of the relationships they have with others, identify types of conflict in patients  lives and how they impact symptoms/functioning, and identify how they can improve relationships with relationship and interpersonal skills they have learned.     Patient Session Goals / Objectives:  Familiarized patients with awareness to the different types of relationships they may have with different people and substances in their lives  Discussed and practiced strategies to promote healthier understanding of interpersonal relationships with a focus on awareness of conflict, the causes of conflict, and the ways to transform conflict  Discussed the use of substances and its impact on their relationships      Patient Participation / Response:  Fully participated with the group by sharing personal reflections / insights and openly received / provided feedback with other participants.    Demonstrated understanding of topics discussed through group discussion and participation, Demonstrated understanding of relationship skills and communication skills, Identified / Expressed personal readiness to incorporate effective communication skills, and Practiced skills in session    Treatment Plan:  Patient has an initial individualized treatment  plan that was created as part of their diagnostic assessment / admission process.  A master individualized treatment plan is in the process of being developed with the patient and multi-disciplinary care team.    [facility]

## 2024-10-21 NOTE — GROUP NOTE
Psychoeducation Group Note    PATIENT'S NAME: Ambar Borges  MRN:   [id number]  :   1975  ACCT. NUMBER: [id number]  DATE OF SERVICE: 10/21/24  START TIME:  8:30 AM  END TIME:  9:20 AM  FACILITATOR: Bentley Alcala RN  TOPIC:  Wellness Group: Health Maintenance  [facility] Adult Partial Hospitalization Program  TRACK: [facility] [place]    NUMBER OF PARTICIPANTS: 4    Summary of Group / Topics Discussed:  Health Maintenance: Eight Dimensions of Wellness: The concept of holistic health through the model of eight dimensions was introduced. Group members participated in identifying behaviors and activities in each of the dimensions of wellness.  The importance of each dimension was reinforced and the concept of balance in life as it relates to wellness was explored.      Patient Session Goals / Objectives:  Verbalized understanding of balance in wellness and how it relates to their life  Identified and explained the eight dimensions of wellness  Categorized activities and wellness needs into corresponding dimensions appropriately during exercise        Patient Participation / Response:  Fully participated with the group by sharing personal reflections / insights and openly received / provided feedback with other participants.    Demonstrated understanding of topics discussed through group discussion and participation, Identified / Expressed personal readiness to practice skills, and Verbalized understanding of health maintenance topic    Treatment Plan:  Patient has a current master individualized treatment plan.  See Epic treatment plan for more information.    Bentley Alcala RN

## 2024-10-21 NOTE — GROUP NOTE
Process Group Note    PATIENT'S NAME: Ambar Borges  MRN:   [id number]  :   1975  ACCT. NUMBER: [id number]  DATE OF SERVICE: 10/21/24  START TIME: 11:00 AM  END TIME: 11:50 AM  FACILITATOR: Jaren Atwood TH  TOPIC:  Process Group    Diagnoses:    Psychosis, unspecified psychosis type (H)        [facility] Adult Partial Hospitalization Program  TRACK: [facility]     NUMBER OF PARTICIPANTS: 4        Data:    Session content: At the start of this group, patients were invited to check in by identifying themselves, describing their current emotional status, and identifying issues to address in this group.   Area(s) of treatment focus addressed in this session included Symptom Management, Personal Safety, and Community Resources/Discharge Planning.  Patient arrived on time for group and engaged with the prompted check-in questions pertaining to emotions, goals, boundaries, barriers, safety concerns, chemical use, medication management, and group feedback, and stated the following:  Pt reported  feeling confident and happy today.   Pt reported trying to not ruminate.  Pt reported being  for 19yrs.  Pt stated worrying about her house and her pets.  Pt trying to  not be as self-conscious.   Pt reported trying to shift her mindset from being negative to positive. Pt reported being grateful for her family. Pt denied safety concerns.  Pt denied any SI/HI/SIB.      Therapeutic Interventions/Treatment Strategies:  Psychotherapist offered support, feedback and validation and reinforced use of skills. Treatment modalities used include Motivational Interviewing and Cognitive Behavioral Therapy.    Assessment:    Patient response:   Patient responded to session by accepting feedback, giving feedback, listening, focusing on goals, being attentive, and accepting support    Possible barriers to participation / learning include: severity of symptoms    Health Issues:   None reported       Substance Use  Review:   Substance Use: No active concerns identified.    Mental Status/Behavioral Observations  Appearance:   Appropriate   Eye Contact:   Good   Psychomotor Behavior: Normal   Attitude:   Cooperative   Orientation:   All  Speech   Rate / Production: Normal    Volume:  Normal   Mood:    Normal  Affect:    Appropriate   Thought Content:   Clear  Thought Form:  Coherent  Logical     Insight:    Good     Plan:   Safety Plan: No current safety concerns identified.  Recommended that patient call 911 or go to the local ED should there be a change in any of these risk factors.   Barriers to treatment:  Pt reported trying to not ruminate.  Patient Contracts (see media tab):  None  Substance Use: Not addressed in session   Continue or Discharge: Patient will continue in Adult Day Treatment (ADT)  as planned. Patient is likely to benefit from learning and using skills as they work toward the goals identified in their treatment plan.      Jaren Atwood, TH  October 21, 2024

## 2024-10-21 NOTE — GROUP NOTE
Psychoeducation Group Note    PATIENT'S NAME: Ambar Borges  MRN:   [id number]  :   1975  ACCT. NUMBER: [id number]  DATE OF SERVICE: 10/21/24  START TIME:  9:30 AM  END TIME: 10:20 AM  FACILITATOR: Osiris Morrison OT  TOPIC: MH PHP OT Group: Lifestyle Balance and Structure  [facility] Adult Partial Hospitalization Program  TRACK: [facility]     NUMBER OF PARTICIPANTS: 4    Summary of Group / Topics Discussed:  Difficulty Day Planning: Patients were guided through therapeutic discussion focused on identifying how they feel physically and emotionally when they are having a difficult day and are in need of support. Psychoeducation was offered regarding the potential benefits of developing a plan to follow when they are feeling dysregulated to assist with decision making, self-regulation, and ensuring safety. Patients then developed and personalized a functional difficult day plan which identifies skills and tools that are helpful in the areas of: medications, sleep, nutrition, movement, support, leisure or self-care activities, and coping skills. Patients identified a place they can keep this document to easily access it as needed.     Patient Session Goals / Objectives:      Identified how they feel physically and emotionally when they are having a difficult day, and what daily activities may become difficult.     Identified personalized skills to utilize when appropriate and incorporated them into a functional plan to follow as needed.     Established a plan for accessing their plan in their own environments.        Patient Participation / Response:  Fully participated with the group by sharing personal reflections / insights and openly received / provided feedback with other participants.    Demonstrated understanding of content through   identifying what a difficult day looks like and a cope ahead plan utilizing different strategies including: medication, sleep, nutrition, movement, support systems,  leisure/self-care activities, and coping skills. Patient's affect was appropriate to situation and mood congruent.      Treatment Plan:  Patient has an initial individualized treatment plan that was created as part of their diagnostic assessment / admission process.  A master individualized treatment plan is in the process of being developed with the patient and multi-disciplinary care team.    Osiris Morrison, OT

## 2024-10-21 NOTE — H&P
"[facility]   Adult Mental Health Outpatient Programs  Initial Psychiatric Evaluation    Patient: Ambar Borges  Preferred Name: Ambar  MRN: [id number]  : 1975  Acct. No.: [id number]  Date of Service: 10/21/24  Program Track: [facility]    Date of Diagnostic Assessment/Psychosocial Evaluation: 24    Identifying Data:  Ambar Borges, a 49 year old-year-old with reported history of anxiety, opioid dependence in remission, Alcohol use disorder, depression and panic disorder  , presents for initial visit to provide oversight of programmatic care. Patient attended the session alone, uses she/her pronouns, and prefers to be called: \"Ambar\"    Presenting Concern:  \"I wasn't ready to really start the program before.\"   Per diagnostic assessment: \"\"I have been an anxious person nearly my entire life. I thought I was handling it very well, and it started to take over about a month ago \"    History of Present Illness:  Chart reviewed, history as documented reviewed with Ambar. Patient endorses:  Quit program ([facility]) due to hearing voices telling her to drop out.  She admits that these are not hallucinations in that they are voices from someone else outside her head, but her own internal dialogue telling her things (usually negative things about herself)  The voices are worse in the morning.  Today they went away.    She admits that when she takes her medications regularly and does not drink, the voices and she in general, does better  She states her addiction provider (Dr. Austen Marie) is concerned about the quick taper of clonazepam  With further discussion, Ambar admits that she is not really sure how often she takes it.  She knows she is prescribed 0.5 mg tid prn.  She takes it every morning for sure but then sometimes does not take it in the afternoon.    She also does not always remember how much or how frequently she drinks.  She states her last drink was " "yesterday, one beer.  Denies SI    Goals for Treatment (also please see individualized treatment plan):  \"Understand more about myself\"  \"To quit drinking\"  \"To become a stable partner to my  and son\"    Review of Symptoms  Review of systems as recorded in diagnostic assessment reviewed with patient.  Today notes:  Sleep: ok  Appetite: less, has lost around 40 pounds over the last 4 months  Suicidal ideation: denies current or recent suicidal ideation or behavior  Thoughts of non-suicidal self-injury: denied  Recent self-injurious behavior: denied  Homicidal ideation: denied  Other safety concerns: denied    Activities of Daily Living and Related Systems Impacted by Illness:  Hygiene: ok  Socialization: hard, relies on  for most things, when alone at night, struggles the most  Activities of Daily Living: (cleaning, shopping, bills, etc.): minimal  Concerns related to work: n/a    Substance use:  Alcohol around 1 drink a night but could be more.       Current Medications:  Current Outpatient Medications   Medication Sig Dispense Refill    B Complex Vitamins (VITAMIN  B COMPLEX) tablet Take 1 tablet by mouth daily.      buprenorphine HCl-naloxone HCl (SUBOXONE) 8-2 MG per film Place 1 Film under the tongue 2 times daily as needed (symptoms).      clonazePAM (KLONOPIN) 0.5 MG tablet Take 0.5 mg by mouth 3 times daily as needed for anxiety.      ibuprofen (ADVIL) 200 MG capsule Take 600 mg by mouth 2 times daily as needed for mild pain.      levothyroxine (SYNTHROID, LEVOTHROID) 150 MCG tablet Take 1 tablet by mouth daily. 90 tablet 3    OLANZapine (ZYPREXA) 5 MG tablet Take 1 tablet (5 mg) by mouth 2 times daily. 7 tablet 0    OLANZapine (ZYPREXA) 5 MG tablet Take 1 tablet (5 mg) by mouth 2 times daily. 60 tablet 1    progesterone (PROMETRIUM) 100 MG capsule Take 200 mg by mouth at bedtime.      Turmeric (QC TUMERIC COMPLEX PO) Take 1 capsule by mouth daily.      venlafaxine (EFFEXOR XR) 37.5 MG 24 hr " "capsule Take 37.5 mg by mouth daily.         The above list was reviewed and updated in EPIC with patient today.     Patient is taking medications as prescribed and denies adverse effects    Medication History/Past Medication Trials:  Has tried other selective serotonin reuptake inhibitor's in the past but does not remember them.  Lexapro worked for a while    Remainder of history, including psychiatric, substance, family, social as documented in diagnostic assessment/psychosocial evaluation and/or above    Medical Review of Systems:  Pertinent: None    Laboratory Results:  Most recent labs reviewed. Pertinent updates/findings: None.       Mental Status Examination:  Vital Signs: [facility] 03/15/2012    Appearance: adequately groomed, appears older than stated age, and in no apparent distress.  Attitude: cooperative   Eye Contact: good   Muscle Strength and Tone: normal  Psychomotor Behavior: normal or unremarkable   Gait and Station: normal width, turn, arm swing  Speech: clear, coherent, decreased prosody, regular rate, regular rhythm, and fluent  Associations: No loosening of associations  Thought Process: coherent and goal directed  Thought Content: no evidence of suicidal ideation or homicidal ideation, no evidence of psychotic thought, no auditory hallucinations present, and no visual hallucinations present  Mood: \"anxious\"  Affect: mood congruent  Insight: good  Judgment: intact, adequate for safety  Impulse Control: intact  Oriented to: time, place, person, and situation  Attention Span and Concentration: Normal  Language: Intact  Recent and Remote Memory: Delayed & immediate recall intact  Fund of Knowledge/Assessment of Intelligence: Average  Capacity of Activities of Daily Living: Independent, able to participate in programmatic care services.    DSM5 Diagnosis/es:  No diagnosis found.  KRYSTIN severe  Panic disorder per history  MDD, recurrent, moderate with no psychosis  Alcohol use disorder, moderate  Opioid use " disorder, severe in remission with suboxone    Assessment/Plan:  Ambar presents today for initial psychiatric evaluation as part of oversight of programmatic care. Ambar is trying PHP for a second time.  She and her  had a discussion that this is important for her to do.  She states now she is in a better position to do the program and knows what it entails.  She does appear today brighter, better groomed, focusing better and able to track than previous.  There seems to be high anxiety and some cluster B traits that may be playing into her symptoms.  There seems to be some dependent personality and borderline personality that may be a part of the picture.  This could explain some disinhibition with the benzo over time.  There is still a need to know for sure how much klonopin she is taking and how much she is drinking.  There are concerns of medication compliance although she states she is taking the medications as prescribed.  Ambar states she can stop drinking and wants to do it on her own without medications or treatment.  If she continues to drink, not take her meds correctly or stops the program, she is at risk for worsening of symptoms and return to the ED and/or hospital.    Anxiety  Continue current medications.  Will decrease klonopin to 0.5 mg once in the morning and then another dose prn during the day.  She was told she is no longer able to take the 3rd dose.  She is going to journal when she takes it so we have a more accurate understanding of how much she is taking to help with the taper.    Most likely will need to increase effexor xr to 75 mg, but will wait for next week after a better understanding on her klonopin use.    Work the program    Depression  Continue current medications  Work the program  Continue to journal about med usage and how much she drinks each day.        Safety Assessment:  Ambar reports suicidal ideation and/or non-suicidal self-injury or thoughts thereof as noted  above  Ambar is future-oriented and is engaged in treatment planning   I do not feel that Ambar meets criteria for a 72-hour involuntary hold and remains appropriate for an outpatient level of care      Signed:   Abe Hussein MD   October 21, 2024      Visit Details:  Type of service: In-person  Location (patient and provider): [place] [facility]    Level of Medical Decision Making:   - *HIGH ACUITY* - At least 1 chronic problem with severe exacerbation, progression, or side effects of treatment  - Moderate (or greater) risk of harm to self or others  - Functional impairment that could lead to serious consequences  Discussion of management or test interpretation with external physician/other qualified healthcare professional/appropriate source - programmatic care multidisciplinary treatment team    Chart review as part of intake process includes diagnostic assessment/psychosocial evaluation and any recent updates, as well as recent ED and/or inpatient documentation, where applicable.The reader is referred to those sources for additional information.      **Time documented must exclude any time separately billed (e.g. add on therapy time)**:[id number]}     This document completed in part using [employer] dictation software and therefore may contain inadvertent word or phrase substitutions.

## 2024-10-21 NOTE — GROUP NOTE
Psychotherapy Group Note    PATIENT'S NAME: Ambar Borges  MRN:   [id number]  :   1975  ACCT. NUMBER: [id number]  DATE OF SERVICE: 10/21/24  START TIME:  1:00 PM  END TIME:  1:50 PM  FACILITATOR: Jaren Atwood TH; Bentley Alcala RN  TOPIC:  EBP Group: Social Support  [facility] Adult Partial Hospitalization Program  TRACK: [facility]    NUMBER OF PARTICIPANTS: 4    Summary of Group / Topics Discussed:  Social Support:   Meeting: Patients and their support system participated in a session on creating family and community-based support.  The importance and role of family/social support was stressed to increase the effectiveness of treatment. Patients and their support system were given information on PHP structure, diagnoses served within the program, in addition to key goals and objectives. Additionally, physical symptoms, thoughts, and behaviors commonly observed in psychopathology were presented and discussed.  Lastly, information was presented on ways to help promote and support recovery.  The patients and  system benefitted from this session by working through an exercise designed to clarify their needs for assistance and support in order to enhance their recovery. Staff helped each clarify their roles, so that the patients can be in charge of their stabilization and recovery.      Patient Session Goals / Objectives:  Normalize aspects of mental health conditions and mental illness  Provide education on  normal  vs.  abnormal  aspects of mental health  Clarify the role of partial hospitalization programming  Reduce stigma associated with mental health diagnosis (es).  Provide social support system with techniques and strategies to improve communication and enhance empathy.  Improve interpersonal communication regarding symptoms  Build a sense of mastery and self-respect      Patient Participation / Response:  Fully participated with the group by sharing  personal reflections / insights and openly received / provided feedback with other participants.    {OP MH PROGRESS NOTE PHP SOCIAL SUPPORT:[id number]    Treatment Plan:  Patient has a current master individualized treatment plan.  See Epic treatment plan for more information.    Jaren Atwood, TH

## 2024-10-22 ENCOUNTER — HOSPITAL ENCOUNTER (OUTPATIENT)
Dept: BEHAVIORAL HEALTH | Facility: CLINIC | Age: 49
Discharge: HOME | End: 2024-10-22
Attending: PSYCHIATRY & NEUROLOGY
Payer: COMMERCIAL

## 2024-10-22 PROCEDURE — H0035 MH PARTIAL HOSP TX UNDER 24H: HCPCS

## 2024-10-22 PROCEDURE — H0035 MH PARTIAL HOSP TX UNDER 24H: HCPCS | Performed by: COUNSELOR

## 2024-10-22 NOTE — GROUP NOTE
Psychotherapy Group Note    PATIENT'S NAME: Ambar Borges  MRN:   [id number]  :   1975  ACCT. NUMBER: [id number]  DATE OF SERVICE: 10/22/24  START TIME: 12:00 PM  END TIME: 12:50 PM  FACILITATOR: Jaren Atwood TH; Bentley Alcala RN  TOPIC: MH EBP Group: Specialty Awareness  [facility] Adult Partial Hospitalization Program  TRACK: [facility] PHP/IOP 2 Combined    NUMBER OF PARTICIPANTS: 9    Summary of Group / Topics Discussed:  Specialty Topics: Goal Setting: Provided education and assessment on patient's group programming goals. Evaluated patient's assessment of their ability to participate in groups, share emotions with group members, and openness to the group format. Provided education regarding appropriate individual-based group therapy goals.     Patient Session Goals and Objectives:  -Participate in reflective assessment of group readiness.  -Understand appropriate topics and methods to discuss those topics in group programming.  -Identify and problem solve barriers to group participation.  -Identify strategies to actively cope while engaging in group programming.   -Reflected up individualized treatment plans  -Meet with members of the treatment team to assess goal progress       Patient Participation / Response:  Fully participated with the group by sharing personal reflections / insights and openly received / provided feedback with other participants.    Demonstrated understanding of topics discussed through group discussion and participation, Identified / Expressed readiness to act on skill suggestions discussed in topic, and Verbalized understanding of ways to proactively manage illness    Treatment Plan:  Patient has a current master individualized treatment plan.  See Epic treatment plan for more information.    TASHA Syed

## 2024-10-22 NOTE — GROUP NOTE
Process Group Note    PATIENT'S NAME: Ambar Borges  MRN:   [id number]  :   1975  ACCT. NUMBER: [id number]  DATE OF SERVICE: 10/22/24  START TIME: 11:00 AM  END TIME: 11:50 AM  FACILITATOR: Megan Lyon LPCC  TOPIC:  Process Group    Diagnoses:  KRYSTIN severe  Panic disorder per history  MDD, recurrent, moderate with no psychosis  Alcohol use disorder, moderate  Opioid use disorder, severe in remission with suboxone    [facility] Mental Health Outpatient Programs  TRACK: [place] PHP merged with [facility]    NUMBER OF PARTICIPANTS: 9        Data:    Session content: At the start of this group, patients were invited to check in by identifying themselves, describing their current emotional status, and identifying issues to address in this group.   Area(s) of treatment focus addressed in this session included Symptom Management and Personal Safety.    Patient reported feeling 'nervous, also good.'   Patient discussed working toward learning how to focus on things.   Skills they plan to practice to address the goal include talking things out, being in group.   They identified a potential barrier as high anxiety, fear about digging in to places that are sensitive.   Patient reported no safety concerns or SIB.  Patient reported no substance use.  Patient reported taking medications as prescribed.  Patient reported feeling proud/grateful for group.      Therapeutic Interventions/Treatment Strategies:  Psychotherapist offered support, feedback and validation, set limits, provided redirection, and reinforced use of skills. Treatment modalities used include Motivational Interviewing. Interventions include Behavioral Activation: Reinforced benefits/challenges of change process through applying skills to replace unwanted behaviors and Explored how behaviors effect mood and interact with thoughts and feelings.    Assessment:    Patient response:   Patient responded to session by accepting feedback, giving  feedback, listening, focusing on goals, being attentive, accepting support, demonstrating behavior change, and verbalizing understanding    Possible barriers to participation / learning include:  history of poor attendance    Health Issues:   None reported       Substance Use Review:   Substance Use: No active concerns identified.    Mental Status/Behavioral Observations  Appearance:   Appropriate   Eye Contact:   Good   Psychomotor Behavior: Restless   Attitude:   Cooperative   Orientation:   All  Speech   Rate / Production: Normal/ Responsive   Volume:  Normal   Mood:    Anxious   Affect:    Worrisome   Thought Content:   Rumination  Thought Form:  Coherent     Insight:    Fair     Plan:   Safety Plan: No current safety concerns identified.  Recommended that patient call 911 or go to the local ED should there be a change in any of these risk factors.   Barriers to treatment: None identified  Patient Contracts (see media tab):  None  Substance Use: Not addressed in session   Continue or Discharge: Patient will continue in Adult Partial Hospitalization Program (PHP)  as planned. Patient is likely to benefit from learning and using skills as they work toward the goals identified in their treatment plan.      ABNER Shelton  October 22, 2024

## 2024-10-23 ENCOUNTER — HOSPITAL ENCOUNTER (OUTPATIENT)
Dept: BEHAVIORAL HEALTH | Facility: CLINIC | Age: 49
Discharge: HOME | End: 2024-10-23
Attending: PSYCHIATRY & NEUROLOGY
Payer: COMMERCIAL

## 2024-10-23 PROCEDURE — H0035 MH PARTIAL HOSP TX UNDER 24H: HCPCS

## 2024-10-23 PROCEDURE — H0035 MH PARTIAL HOSP TX UNDER 24H: HCPCS | Performed by: COUNSELOR

## 2024-10-23 NOTE — GROUP NOTE
Psychotherapy Group Note    PATIENT'S NAME: Ambar Borges  MRN:   [id number]  :   1975  ACCT. NUMBER: [id number]  DATE OF SERVICE: 10/23/24  START TIME: 12:00 PM  END TIME: 12:50 PM  FACILITATOR: Jaren Atwood TH  TOPIC: MH EBP Group: Relationship Skills  [facility] Adult Partial Hospitalization Program  TRACK: [facility] Combined    NUMBER OF PARTICIPANTS: 8    Summary of Group / Topics Discussed:  Relationship Skills: Assertive Communication: Patients were provided with a general overview of assertive communication skills and how practicing assertive communication skills will assist patients in developing healthier and more effective relationships. Patients reviewed their current awareness on ability to practice assertive communication, ways to increase assertive communication, and identified/problem solved barriers to assertive communication.     Patient Session Goals / Objectives:  Identified and discussed patient individual challenges with communication  Presented and practiced effective communication skills in session  Assisted patients in implementing more effective communication skills in their relationships      Patient Participation / Response:  Fully participated with the group by sharing personal reflections / insights and openly received / provided feedback with other participants.    Demonstrated understanding of topics discussed through group discussion and participation, Demonstrated understanding of relationship skills and communication skills, Identified / Expressed personal readiness to incorporate effective communication skills, and Verbalized understanding of communication skills, communication challenges, and communication strengths    Treatment Plan:  Patient has a current master individualized treatment plan.  See Epic treatment plan for more information.    TASHA Syed

## 2024-10-23 NOTE — GROUP NOTE
Process Group Note    PATIENT'S NAME: Ambar Borges  MRN:   [id number]  :   1975  ACCT. NUMBER: [id number]  DATE OF SERVICE: 10/23/24  START TIME: 11:00 AM  END TIME: 11:50 AM  FACILITATOR: Megan Lyon LPCC  TOPIC:  Process Group    Diagnoses:  KRYSTIN severe  Panic disorder per history  MDD, recurrent, moderate with no psychosis  Alcohol use disorder, moderate  Opioid use disorder, severe in remission with suboxone    [facility] Adult Partial Hospitalization Program  TRACK: [place] PHP merged with [facility]    NUMBER OF PARTICIPANTS: 7        Data:    Session content: At the start of this group, patients were invited to check in by identifying themselves, describing their current emotional status, and identifying issues to address in this group.   Area(s) of treatment focus addressed in this session included Symptom Management and Personal Safety.    Patient reported feeling 'anxious.'   Patient discussed working toward being patient with herself about her anxiety.   Skills they plan to practice to address the goal include being in group, distracting.   They identified a potential barrier as anxiety, resistance to treatment.   Patient reported no safety concerns or SIB.  Patient reported no substance use.  Patient reported taking medications as prescribed.  Patient reported feeling proud/grateful for the weather, her .       Therapeutic Interventions/Treatment Strategies:  Psychotherapist offered support, feedback and validation, set limits, provided redirection, and reinforced use of skills. Treatment modalities used include Motivational Interviewing and Cognitive Behavioral Therapy. Interventions include Behavioral Activation: Reinforced benefits/challenges of change process through applying skills to replace unwanted behaviors and Encouraged strategies to reduce individual procrastination and increase motivation by increasing goal-directed activities to enhance mood and reduce symptoms.  and Cognitive Restructuring:  Assisted patient in formulating new neutral/positive alternatives to challenge less helpful / ineffective thoughts.    Assessment:    Patient response:   Patient responded to session by accepting feedback, giving feedback, listening, focusing on goals, being attentive, accepting support, demonstrating behavior change, and verbalizing understanding    Possible barriers to participation / learning include: and no barriers identified    Health Issues:   None reported       Substance Use Review:   Substance Use: Last use: patient reports occasional alcohol use, history of opioid use    Mental Status/Behavioral Observations  Appearance:   Appropriate   Eye Contact:   Good   Psychomotor Behavior: Restless   Attitude:   Cooperative   Orientation:   All  Speech   Rate / Production: Normal/ Responsive   Volume:  Normal   Mood:    Depressed   Affect:    Appropriate   Thought Content:   Clear and Rumination  Thought Form:  Coherent  Logical     Insight:    Good     Plan:   Safety Plan: No current safety concerns identified.  Recommended that patient call 911 or go to the local ED should there be a change in any of these risk factors.   Barriers to treatment: None identified  Patient Contracts (see media tab):  None  Substance Use: Not addressed in session   Continue or Discharge: Patient will continue in Adult Partial Hospitalization Program (PHP)  as planned. Patient is likely to benefit from learning and using skills as they work toward the goals identified in their treatment plan.      Megan Lyon, ABNER  October 23, 2024

## 2024-10-23 NOTE — GROUP NOTE
Psychotherapy Group Note    PATIENT'S NAME: Ambar Borges  MRN:   [id number]  :   1975  ACCT. NUMBER: [id number]  DATE OF SERVICE: 10/22/24  START TIME:  1:00 PM  END TIME:  1:50 PM  FACILITATOR: Megan Lyon LPCC  TOPIC: MH EBP Group: Relationship Skills  [facility] Adult Partial Hospitalization Program  TRACK: [place] PHP merged with [facility]    NUMBER OF PARTICIPANTS: 7    Summary of Group / Topics Discussed:  Relationship Skills: Boundaries: Patients were provided with a general overview of interpersonal boundaries and how lack of boundaries relates to symptoms and functioning. The purpose is to help patients identify boundary issues and gain awareness and skills to work towards healthier interpersonal boundaries. Current awareness of healthy boundary characteristics and barriers to establishing healthy boundaries were discussed.    Patient Session Goals / Objectives:  Familiarized patients with the concept of interpersonal boundaries and their characteristics  Discussed and practiced strategies to promote healthier interpersonal boundaries  Identified boundary issues and identified plan to improve boundaries      Patient Participation / Response:  Fully participated with the group by sharing personal reflections / insights and openly received / provided feedback with other participants.    Demonstrated understanding of topics discussed through group discussion and participation, Demonstrated understanding of relationship skills and communication skills, Verbalized understanding of communication skills, communication challenges, and communication strengths, and Practiced skills in session    Treatment Plan:  Patient has a current master individualized treatment plan.  See Epic treatment plan for more information.    ABNER Shelton

## 2024-10-23 NOTE — GROUP NOTE
Psychoeducation Group Note    PATIENT'S NAME: Ambar Borges  MRN:   [id number]  :   1975  ACCT. NUMBER: [id number]  DATE OF SERVICE: 10/23/24  START TIME:  9:30 AM  END TIME: 10:20 AM  FACILITATOR: Bentley Alcala RN  TOPIC:  Wellness Group: Brain [facility] Adult Partial Hospitalization Program  TRACK: [facility]    NUMBER OF PARTICIPANTS: 4    Summary of Group / Topics Discussed:  Brain Health:  Pathophysiology of Mood Disorders: Patients were educated on mood disorder etiology and neuroscience, risk factors, symptoms, and pharmacologic, psychotherapeutic, and complementary treatment options. Patients were guided on a discussion of mental, behavioral, and physical symptoms and shared their symptoms with the group.     Patient Session Goals / Objectives:  Described what mood disorders are and identified risk factors   Explained how chemical imbalances in the brain can cause symptoms and how medications work to reverse this imbalance   Identified and described pharmacologic, psychotherapeutic, and complementary treatment options      Patient Participation / Response:  Fully participated with the group by sharing personal reflections / insights and openly received / provided feedback with other participants.    Demonstrated understanding of topics discussed through group discussion and participation, Identified / Expressed personal readiness to practice skills, and Verbalized understanding of brain health topic    Treatment Plan:  Patient has a current master individualized treatment plan.  See Epic treatment plan for more information.    Bentley Alcala RN

## 2024-10-23 NOTE — GROUP NOTE
Psychoeducation Group Note    PATIENT'S NAME: Ambar Borges  MRN:   [id number]  :   1975  ACCT. NUMBER: [id number]  DATE OF SERVICE: 10/23/24  START TIME:  8:30 AM  END TIME:  9:20 AM  FACILITATOR: Osiris Morrison OT  TOPIC:  PHP OT Group: Lifestyle Balance and Structure  [facility] Adult Partial Hospitalization Program  TRACK: PHP    NUMBER OF PARTICIPANTS: 4    Summary of Group / Topics Discussed:  Lifestyle Balance and Structure: Benefits of Leisure on Mental Health: Patients explored and learned about the benefits and possibilities of leisure activity to create lifestyle balance that supports their mental and physical wellbeing.  Patients were assisted to identify individualized leisure interests, recognized the benefits of leisure activity on mental health, and problem solved barriers to leisure engagement and strategies to overcome.  Patients independently planned leisure activities to engage in during the week. Validation, support, and feedback provided throughout group process.       Patient Session Goals / Objectives:   Increased awareness of the importance of engagement in leisure activities to support. lifestyle balance and perceived quality of life.   Identified strategies to recognize and challenge barriers to leisure participation.    Facilitated exploration of meaningful leisure interests and values.   Practiced and reflected on how to generalize taught skills to their everyday life.        Patient Participation / Response:  Fully participated with the group by sharing personal reflections / insights and openly received / provided feedback with other participants.    Demonstrated understanding of content through   Identifying barriers to participating in leisure, creating strategies to overcome barriers, and participated in planning ahead activity for increased incorporated leisure activities into daily routine.  Patient's affect was appropriate to situation and mood  congruent.      Treatment Plan:  Patient has an initial individualized treatment plan that was created as part of their diagnostic assessment / admission process.  A master individualized treatment plan is in the process of being developed with the patient and multi-disciplinary care team.    Osiris Morrison OT

## 2024-10-24 ENCOUNTER — HOSPITAL ENCOUNTER (OUTPATIENT)
Dept: BEHAVIORAL HEALTH | Facility: CLINIC | Age: 49
Discharge: HOME | End: 2024-10-24
Attending: PSYCHIATRY & NEUROLOGY
Payer: COMMERCIAL

## 2024-10-24 PROCEDURE — H0035 MH PARTIAL HOSP TX UNDER 24H: HCPCS

## 2024-10-24 PROCEDURE — H0035 MH PARTIAL HOSP TX UNDER 24H: HCPCS | Performed by: COUNSELOR

## 2024-10-24 NOTE — ADDENDUM NOTE
Encounter addended by: Megan Lyon [facility] on: 10/24/2024 9:58 AM   Actions taken: Clinical Note Signed, Charge Capture section accepted

## 2024-10-24 NOTE — GROUP NOTE
"Psychoeducation Group Note    PATIENT'S NAME: Ambar Borges  MRN:   [id number]  :   1975  ACCT. NUMBER: [id number]  DATE OF SERVICE: 10/24/24  START TIME:  9:30 AM  END TIME: 10:20 AM  FACILITATOR: Bentley Alcala RN  TOPIC:  Wellness Group: [facility] Adult Partial Hospitalization Program  TRACK: [place] [facility]    NUMBER OF PARTICIPANTS: 4    Summary of Group / Topics Discussed:  [facility]:  [facility]: Pick or Pass Activity    Patient Session Goals / Objectives:  Patients participated with the activity called \"Pick or Pass\"  This is to promote self confidence, boost self esteem.  This is a group activity that promotes group cohesion.          Patient Participation / Response:  Fully participated with the group by sharing personal reflections / insights and openly received / provided feedback with other participants.    Demonstrated understanding of topics discussed through group discussion and participation, Identified / Expressed personal readiness to practice skills, and Verbalized understanding of this topic    Treatment Plan:  Patient has a current master individualized treatment plan.  See Epic treatment plan for more information.    Bentley Alclaa RN  "

## 2024-10-24 NOTE — GROUP NOTE
Psychotherapy Group Note    PATIENT'S NAME: Ambar Borges  MRN:   [id number]  :   1975  ACCT. NUMBER: [id number]  DATE OF SERVICE: 10/24/24  START TIME: 12:00 PM  END TIME: 12:50 PM  FACILITATOR: Megan Lyon LPCC  TOPIC: MH EBP Group: Coping Skills  [facility] Adult Partial Hospitalization Program  TRACK: [facility] merged w/ [facility]    NUMBER OF PARTICIPANTS: 8    Summary of Group / Topics Discussed:  Coping Skills: Radical Acceptance: Patients learned radical acceptance as a way to tolerate heightened stress in the moment.  Patients identified situations that necessitate radical acceptance.  They focused on applying acceptance of the moment to tolerate difficult emotions and events. Patients discussed how to distinguish when this can be useful in their lives and when other skills are more relevant or helpful.    Patient Session Goals / Objectives:  Understand that some amount of pain exists for each of us in our lives  Process the difficulty of acceptance in our lives and benefits of radical acceptance to mood and functioning.  Demonstrate understanding of when to use the radical acceptance to tolerate distress by providing examples of situations where this could be applied.  Identify barriers to applying radical acceptance to difficult situations and explore strategies to overcome them      Patient Participation / Response:  Fully participated with the group by sharing personal reflections / insights and openly received / provided feedback with other participants.    Demonstrated understanding of topics discussed through group discussion and participation, Expressed understanding of the relevance / importance of coping skills at distressing times in life, Demonstrated knowledge of when to consider using a variety of coping skills in daily life, Identified barriers to applying coping skills, Practiced 2-3 new coping skills in session, and Identified / Expressed personal readiness to practice  new coping skills    Treatment Plan:  Patient has a current master individualized treatment plan.  See Epic treatment plan for more information.    Megan Lyon LPCC

## 2024-10-24 NOTE — GROUP NOTE
Psychotherapy Group Note    PATIENT'S NAME: Ambar Borges  MRN:   [id number]  :   1975  ACCT. NUMBER: [id number]  DATE OF SERVICE: 10/23/24  START TIME:  1:00 PM  END TIME:  1:50 PM  FACILITATOR: Megan Lyon LPCC  TOPIC: MH EBP Group: Behavioral Activation  [facility] Adult Partial Hospitalization Program  TRACK: [place] [facility] merged with [facility]    NUMBER OF PARTICIPANTS: 7    Summary of Group / Topics Discussed:  Behavioral Activation: [place]: {Patients identified situations that prompt unwanted and unhelpful emotions / thoughts / behaviors.   Patients discussed how to problem solve by proactively using coping skills in potentially difficult situations. Components included describing the situation, brainstorming coping skills, imagining how scenario can/will unfold, rehearsing the action plan, and practicing relaxation to follow.  Patients practiced using these skills to reduce symptom distress and increase effective coping  behaviors.      Patient Session Goals / Objectives:  Identify difficult situation(s), and gain proficiency with alternative behaviors / skills to problem solve.  Increase confidence using coping skills through group practice in session.  Receive and provide feedback regarding skill development.  Apply coping skills in daily life situations.    Patient Participation / Response:  Fully participated with the group by sharing personal reflections / insights and openly received / provided feedback with other participants.    Demonstrated understanding of topics discussed through group discussion and participation, Expressed understanding of the relationship between behaviors, thoughts, and feelings, Shared experiences and challenges with making behavioral changes, Identified barriers to change, Identified / Expressed personal readiness to make behavioral change, Identified ways to increase goal directed activities, and Practiced skills in session    Treatment Plan:  Patient  has a current master individualized treatment plan.  See Epic treatment plan for more information.    Megan Lyon LPCC

## 2024-10-24 NOTE — GROUP NOTE
OT Clinic Group Note    PATIENT'S NAME: Ambar Borges  MRN:   [id number]  :   1975  ACCT. NUMBER: [id number]  DATE OF SERVICE: 10/24/24  START TIME:  8:30 AM  END TIME:  9:20 AM  FACILITATOR: Osiris Morrison OT  TOPIC: [facility] OT Group: Occupational Therapy Clinic  [facility] Adult Partial Hospitalization Program  TRACK: [facility]    NUMBER OF PARTICIPANTS: 4    Summary of Group / Topics Discussed:  Occupational Therapy Clinic: Provided opportunity for patients to independently choose and engage in a therapeutic activity that supports progress towards their goals and psychiatric recovery. The Occupational Therapy Clinic provides a context for patients to monitor their symptoms, gain self-awareness, practice skills (self-regulation, mindfulness, self-talk, focus/concentration, social, productivity), build a sense of self efficacy and mastery, as well as receive validation, support, and resources. OT checks in, observes, assesses, and monitors performance skills and patterns in context with each group member. Patient completed the Occupational Self Assessment (SHARAD) in which identified functional areas their mental health status is impacting and which are most important for them to work on while in the Partial Hospitalization Program. Patient was provided orientation to the OT clinic and overview of purpose of the OT clinic in support of meeting their goals.     Patient Session Goals / Objectives:  Independently identify a purposeful and meaningful therapeutic activity.  Identified which goal(s) they are intentionally working on during session.   Reflected on their performance and share insight about their progress.  Practiced and identified a way to generalize a skill to their everyday life      Patient Participation / Response:  Fully participated with the group by sharing personal reflections / insights and openly received / provided feedback with other participants.    Demonstrated understanding of  content through Pt completed charcoal drawing for leisure exploration. Pt reported feeling that art is a good distraction and a previous passion. Patient's affect was appropriate to situation and mood congruent.      Treatment Plan:  Patient has an initial individualized treatment plan that was created as part of their diagnostic assessment / admission process.  A master individualized treatment plan is in the process of being developed with the patient and multi-disciplinary care team.    Osiris Morrison, OT

## 2024-10-24 NOTE — GROUP NOTE
Process Group Note    PATIENT'S NAME: Ambar Borges  MRN:   [id number]  :   1975  ACCT. NUMBER: [id number]  DATE OF SERVICE: 10/24/24  START TIME: 11:00 AM  END TIME: 11:50 AM  FACILITATOR: Jaren Atwood TH; Osiris Morrison OT  TOPIC:  Process Group    Diagnoses:  Psychosis, unspecified psychosis type (H)     [facility] Adult Partial Hospitalization Program  TRACK: [facility]/IOP 2 Combined    NUMBER OF PARTICIPANTS: 8        Data:    Session content: At the start of this group, patients were invited to check in by identifying themselves, describing their current emotional status, and identifying issues to address in this group.   Area(s) of treatment focus addressed in this session included Symptom Management, Personal Safety, and Community Resources/Discharge Planning.  Patient arrived on time for group and engaged with the prompted check-in questions pertaining to emotions, goals, boundaries, barriers, safety concerns, chemical use, medication management, and group feedback, and stated the following:       Feeling: crawl out of my skin, anxious, racing thoughts; know there is light at the end of the tunnel, mental fatigue     Challenging the negative thoughts helps diffuse the racing thoughts     Treatment Goal: trying to tone down the anxiety: self-reassurance of safety      Skills: positive self-talk      Barriers: myself, negative self-talk, letting myself do what I want to do (fantasizing about leaving/running down the hayward): hasn t worked in the past      Safety concerns: no     Chemicals: no     Meds: yes     Grateful: group: feel so much less alone, space to talk      Support: sharing their journey     Feedback: yes     Processing Time: no       Therapeutic Interventions/Treatment Strategies:  Psychotherapist offered support, feedback and validation and reinforced use of skills. Treatment modalities used include Motivational Interviewing and Cognitive Behavioral Therapy. Interventions  "include Behavioral Activation: Reinforced benefits/challenges of change process through applying skills to replace unwanted behaviors and Explored how behaviors effect mood and interact with thoughts and feelings, Cognitive Restructuring:  Explored impact of ineffective thoughts / distortions on mood and activity, Assisted patient in formulating new neutral/positive alternatives to challenge less helpful / ineffective thoughts, and Facilitated recognition of the connection between negative thoughts and negative core beliefs, Coping Skills: Facilitated discussion on learning and applying radical acceptance skill, Discussed use of self-soothe skills to decrease distress in the body, Assisted patient in identifying 1-2 healthy distraction skills to reduce overall distress, Discussed how the use of intentional \"in the moment\" actions can help reduce distress, and Reviewed patients current calming practices and discussed a more formal way of practicing and accessing skills, and Symptoms Management: Promoted understanding of their diagnoses and how it impacts their functioning.    Assessment:    Patient response:   Patient responded to session by accepting feedback, giving feedback, listening, focusing on goals, being attentive, and accepting support    Possible barriers to participation / learning include: severity of symptoms    Health Issues:   None reported       Substance Use Review:   Substance Use: No active concerns identified.    Mental Status/Behavioral Observations  Appearance:   Appropriate   Eye Contact:   Good   Psychomotor Behavior: Normal   Attitude:   Cooperative   Orientation:   All  Speech   Rate / Production: Normal    Volume:  Normal   Mood:    Anxious  Depressed  Normal  Affect:    Appropriate   Thought Content:   Clear and Rumination  Thought Form:  Coherent  Logical     Insight:    Good     Plan:   Safety Plan: Committed to safety and agreed to follow previously developed safety coping plan.  "   Barriers to treatment:  myself, negative self-talk, letting myself do what I want to do (fantasizing about leaving/running down the hayward): hasn t worked in the past.  Patient Contracts (see media tab):  None  Substance Use: Not addressed in session   Continue or Discharge: Patient will continue in Adult Partial Hospitalization Program (PHP)  as planned. Patient is likely to benefit from learning and using skills as they work toward the goals identified in their treatment plan.      Jaren Atwood, TH October 24, 2024

## 2024-10-24 NOTE — GROUP NOTE
Psychotherapy Group Note    PATIENT'S NAME: Ambar Borges  MRN:   [id number]  :   1975  ACCT. NUMBER: [id number]  DATE OF SERVICE: 10/24/24  START TIME:  1:00 PM  END TIME:  1:50 PM  FACILITATOR: Jaren Atwood TH  TOPIC:  EBP Group: Relationship Skills  [facility] Adult Partial Hospitalization Program  TRACK:  [facility]/IOP 2 Combined    NUMBER OF PARTICIPANTS: 8    Summary of Group / Topics Discussed:  Relationship Skills: Dependencies: Patients were provided with a general overview of different types of dependencies and how they relate to symptoms and functioning. The purpose is to help patients identify the different types of dependencies, how they may be impacted by them, and to gain awareness and skills to work towards healthier relationships.    Patient Session Goals / Objectives:  Familiarized patients with the concept of interpersonal relationships and their characteristics.    Discussed and practiced strategies to promote healthier understanding of interpersonal relationships with a focus on dependencies  Identified types of dependencies in patient s lives and how they impact their symptoms and functioning      Patient Participation / Response:  Fully participated with the group by sharing personal reflections / insights and openly received / provided feedback with other participants.    Demonstrated understanding of topics discussed through group discussion and participation, Demonstrated understanding of relationship skills and communication skills, Identified / Expressed personal readiness to incorporate effective communication skills, and Verbalized understanding of communication skills, communication challenges, and communication strengths    Treatment Plan:  Patient has a current master individualized treatment plan.  See Epic treatment plan for more information.    TASHA Syed

## 2024-10-25 ENCOUNTER — HOSPITAL ENCOUNTER (OUTPATIENT)
Dept: BEHAVIORAL HEALTH | Facility: CLINIC | Age: 49
Discharge: HOME | End: 2024-10-25
Attending: PSYCHIATRY & NEUROLOGY
Payer: COMMERCIAL

## 2024-10-25 PROCEDURE — H0035 MH PARTIAL HOSP TX UNDER 24H: HCPCS | Performed by: COUNSELOR

## 2024-10-25 PROCEDURE — H0035 MH PARTIAL HOSP TX UNDER 24H: HCPCS

## 2024-10-25 NOTE — GROUP NOTE
Psychoeducation Group Note    PATIENT'S NAME: Ambar Borges  MRN:   [id number]  :   1975  ACCT. NUMBER: [id number]  DATE OF SERVICE: 10/25/24  START TIME:  1:00 PM  END TIME:  1:50 PM  FACILITATOR: Osiris Morrison, OT; Jaren Atwood TH  TOPIC: MH Life Skills Group: Sensory Approaches in Mental Health  [facility] Adult Partial Hospitalization Program  TRACK: [facility] Combined    NUMBER OF PARTICIPANTS: 7    Summary of Group / Topics Discussed:  Sensory Approaches in Mental Health:  Sensory Approaches in Mental Health: Self-Regulation Through Sensory Modulation: Patients were provided with education on alerting/calming sensory input and taught skills that can be used immediately to help them calm down and regain self-control.  Patients were taught how to recognize specific signs and symptoms of their individualized state of arousal and how to make changes when needed.  Patients explored self-regulation in their daily life and discussed techniques to help manage symptoms and change levels of arousal when needed to be in control and comfortable, so they are able to function in different environments.  Validation, support, and feedback were provided throughout group process.     Patient Session Goals / Objectives:   Identified specific and individualized self-regulation skills to be used in their daily life to improve daily function.   Identified signs and symptoms of current level of arousal and ways to make changes in level of arousal when needed.     Established a plan for practice of these skills in their own environments.       Patient Participation / Response:  Fully participated with the group by sharing personal reflections / insights and openly received / provided feedback with other participants.    Verbalized understanding of content, Patient would benefit from additional opportunities to practice the content to be able to generalize it to their everyday life with increased  intentionality, consistency, and efficacy in support of their psychiatric recovery, and Patient worked towards initial treatment plan goals     Treatment Plan:  Patient has a current master individualized treatment plan.  See Epic treatment plan for more information.    Jaren Atwood, TH

## 2024-10-25 NOTE — GROUP NOTE
Process Group Note    PATIENT'S NAME: Ambar Borges  MRN:   [id number]  :   1975  ACCT. NUMBER: [id number]  DATE OF SERVICE: 10/25/24  START TIME: 11:00 AM  END TIME: 11:50 AM  FACILITATOR: Jaren Atwood TH; Bentley Alcala RN  TOPIC:  Process Group    Diagnoses:  Psychosis, unspecified psychosis type (H)     [facility] Adult Partial Hospitalization Program  TRACK: [facility] Combined    NUMBER OF PARTICIPANTS: 8        Data:    Session content: At the start of this group, patients were invited to check in by identifying themselves, describing their current emotional status, and identifying issues to address in this group.   Area(s) of treatment focus addressed in this session included Symptom Management, Personal Safety, and Community Resources/Discharge Planning.  Patient arrived on time for group and engaged with the prompted check-in questions pertaining to emotions, goals, boundaries, barriers, safety concerns, chemical use, medication management, and group feedback, and stated the following:    Feeling:  Pt reported feeling good today.   Treatment Goal: keep anxiety at bay   Skills: coming to group every day.   Barriers:  negative bias  Safety concerns: no  Chemicals: no  Meds:  yes, as prescribed.  Grateful: program group peers.  Support: hear from the group  Feedback: yes  Processing Time: No    Therapeutic Interventions/Treatment Strategies:  Psychotherapist offered support, feedback and validation and reinforced use of skills. Treatment modalities used include Motivational Interviewing and Cognitive Behavioral Therapy. Interventions include Behavioral Activation: Reinforced benefits/challenges of change process through applying skills to replace unwanted behaviors, Explored how behaviors effect mood and interact with thoughts and feelings, and Encouraged strategies to reduce individual procrastination and increase motivation by increasing goal-directed activities to  "enhance mood and reduce symptoms., Cognitive Restructuring:  Explored impact of ineffective thoughts / distortions on mood and activity, Assisted patient in formulating new neutral/positive alternatives to challenge less helpful / ineffective thoughts, and Facilitated recognition of the connection between negative thoughts and negative core beliefs, Coping Skills: Facilitated discussion on learning and applying radical acceptance skill, Discussed use of self-soothe skills to decrease distress in the body, Assisted patient in identifying 1-2 healthy distraction skills to reduce overall distress, Discussed how the use of intentional \"in the moment\" actions can help reduce distress, Reviewed patients current calming practices and discussed a more formal way of practicing and accessing skills, and Promoted understanding of how and when to apply grounding strategies to reduce distress and increase presence in the moment, and Symptoms Management: Promoted understanding of their diagnoses and how it impacts their functioning.    Assessment:    Patient response:   Patient responded to session by accepting feedback, giving feedback, listening, focusing on goals, being attentive, and accepting support    Possible barriers to participation / learning include: severity of symptoms    Health Issues:   None reported       Substance Use Review:   Substance Use: No active concerns identified.    Mental Status/Behavioral Observations  Appearance:   Appropriate   Eye Contact:   Good   Psychomotor Behavior: Normal   Attitude:   Cooperative   Orientation:   All  Speech   Rate / Production: Normal    Volume:  Normal   Mood:    Anxious  Normal  Affect:    Appropriate   Thought Content:   Clear  Thought Form:  Coherent  Logical     Insight:    Good     Plan:   Safety Plan: Committed to safety and agreed to follow previously developed safety coping plan.    Barriers to treatment:  negative bias.  Patient Contracts (see media tab):  " None  Substance Use: Not addressed in session   Continue or Discharge: Patient will continue in Adult Partial Hospitalization Program (PHP)  as planned. Patient is likely to benefit from learning and using skills as they work toward the goals identified in their treatment plan.      Jaren Atwood, TH  October 25, 2024

## 2024-10-25 NOTE — GROUP NOTE
Psychotherapy Group Note    PATIENT'S NAME: Ambar Borges  MRN:   [id number]  :   1975  ACCT. NUMBER: [id number]  DATE OF SERVICE: 10/25/24  START TIME:  9:30 AM  END TIME: 10:20 AM  FACILITATOR: Jaren Atwood TH; Osiris Morrison OT  TOPIC: MH EBP Group: Specialty Awareness  [facility] Adult Partial Hospitalization Program  TRACK: [facility]    NUMBER OF PARTICIPANTS: 4    Summary of Group / Topics Discussed:  Specialty Topics: Goal Setting: Provided education and assessment on patient's group programming goals. Evaluated patient's assessment of their ability to participate in groups, share emotions with group members, and openness to the group format. Provided education regarding appropriate individual-based group therapy goals.     Patient Session Goals and Objectives:  -Participate in reflective assessment of group readiness.  -Understand appropriate topics and methods to discuss those topics in group programming.  -Identify and problem solve barriers to group participation.  -Identify strategies to actively cope while engaging in group programming.   -Reflected up individualized treatment plans  -Meet with members of the treatment team to assess goal progress       Patient Participation / Response:  Fully participated with the group by sharing personal reflections / insights and openly received / provided feedback with other participants.    Demonstrated understanding of topics discussed through group discussion and participation, Identified / Expressed readiness to act on skill suggestions discussed in topic, Verbalized understanding of ways to proactively manage illness, and Identified plan to address barriers to practicing skills discussed in topic    Treatment Plan:  Patient has a current master individualized treatment plan.  See Epic treatment plan for more information.    TASHA Syed

## 2024-10-25 NOTE — GROUP NOTE
Psychotherapy Group Note    PATIENT'S NAME: Ambar Borges  MRN:   [id number]  :   1975  ACCT. NUMBER: [id number]  DATE OF SERVICE: 10/25/24  START TIME: 12:00 PM  END TIME: 12:50 PM  FACILITATOR: Jaren Atwood TH  TOPIC: MH EBP Group: Relationship Skills  [facility] Adult [facility] Hospitalization Program  TRACK: [facility] Combined    NUMBER OF PARTICIPANTS:  6    Summary of Group / Topics Discussed:  Relationship Skills: Social Bingo      Patient Participation / Response:  Fully participated with the group by sharing personal reflections / insights and openly received / provided feedback with other participants.    Demonstrated understanding of topics discussed through group discussion and participation, Demonstrated understanding of relationship skills and communication skills, and Identified / Expressed personal readiness to incorporate effective communication skills    Treatment Plan:  Patient has a current master individualized treatment plan.  See Epic treatment plan for more information.    TASHA Syed

## 2024-10-25 NOTE — GROUP NOTE
Psychoeducation Group Note    PATIENT'S NAME: Ambar Borges  MRN:   [id number]  :   1975  ACCT. NUMBER: [id number]  DATE OF SERVICE: 10/25/24  START TIME:  8:30 AM  END TIME:  9:20 AM  FACILITATOR: Bentley Alcala RN  TOPIC:  Wellness Group: Medication Education and Management  [facility] Partial Hospitalization Program  TRACK: [facility]     NUMBER OF PARTICIPANTS: 4    Summary of Group / Topics Discussed:  Medication Educations and Management:  Medication Jeopardy: Patients provided education regarding medication safety, antidepressants, side effects, neuroleptics, expected medication outcomes, knowledge of diagnosis, symptoms, and symptom management through an engaging jeopardy-style format.     Patient Session Goals / Objectives:    Participated in team-based Jeopardy game  Identified strategies for safe use, handling, and disposal of medications  Discussed basic aspects of medication safety, side effects, adverse outcomes and contraindications      Patient Participation / Response:  Fully participated with the group by sharing personal reflections / insights and openly received / provided feedback with other participants.     Demonstrated understanding of topics discussed through group discussion and participation, Identified / Expressed personal readiness to practice skills, and Verbalized understanding of medication education and management topic    Treatment Plan:  Patient has a current master individualized treatment plan.  See Epic treatment plan for more information.    Bentley Alcala RN

## 2024-10-28 ENCOUNTER — HOSPITAL ENCOUNTER (OUTPATIENT)
Dept: BEHAVIORAL HEALTH | Facility: CLINIC | Age: 49
Discharge: HOME | End: 2024-10-28
Attending: PSYCHIATRY & NEUROLOGY
Payer: COMMERCIAL

## 2024-10-28 PROCEDURE — H0035 MH PARTIAL HOSP TX UNDER 24H: HCPCS | Performed by: COUNSELOR

## 2024-10-28 PROCEDURE — H0035 MH PARTIAL HOSP TX UNDER 24H: HCPCS

## 2024-10-28 NOTE — GROUP NOTE
Psychotherapy Group Note    PATIENT'S NAME: Ambar Borges  MRN:   [id number]  :   1975  ACCT. NUMBER: [id number]  DATE OF SERVICE: 10/28/24  START TIME:  1:00 PM  END TIME:  1:50 PM  FACILITATOR: Jaren Atwood TH; Bentley Alcala RN; Dez Arias  TOPIC:  EBP Group: Social Support  [facility] Adult Partial Hospitalization Program  TRACK:  [facility]     NUMBER OF PARTICIPANTS: 4    Summary of Group / Topics Discussed:  Social Support:   Meeting: Patients and their support system participated in a session on creating family and community-based support.  The importance and role of family/social support was stressed to increase the effectiveness of treatment. Patients and their support system were given information on PHP structure, diagnoses served within the program, in addition to key goals and objectives. Additionally, physical symptoms, thoughts, and behaviors commonly observed in psychopathology were presented and discussed.  Lastly, information was presented on ways to help promote and support recovery.  The patients and their support system benefitted from this session by working through an exercise designed to clarify their needs for assistance and support in order to enhance their recovery. Staff helped each clarify their roles, so that the patients can be in charge of their stabilization and recovery.      Patient Session Goals / Objectives:  Normalize aspects of mental health conditions and mental illness  Provide education on  normal  vs.  abnormal  aspects of mental health  Clarify the role of partial hospitalization programming  Reduce stigma associated with mental health diagnosis (es).  Provide social support system with techniques and strategies to improve communication and enhance empathy.  Improve interpersonal communication regarding symptoms  Build a sense of mastery and self-respect      Patient Participation / Response:  Fully participated with the  group by sharing personal reflections / insights and openly received / provided feedback with other participants.    {OP MH PROGRESS NOTE PHP SOCIAL SUPPORT:527117    Treatment Plan:  Patient has a current master individualized treatment plan.  See Epic treatment plan for more information.    Jaren Atwood, TH

## 2024-10-28 NOTE — GROUP NOTE
Psychoeducation Group Note    PATIENT'S NAME: Ambar Borges  MRN:   [id number]  :   1975  ACCT. NUMBER: [id number]  DATE OF SERVICE: 10/28/24  START TIME:  8:30 AM  END TIME:  9:20 AM  FACILITATOR: Osiris Morrison OT  TOPIC: MH Life Skills Group: Sensory Approaches in Mental Health  [facility] Adult Partial Hospitalization Program  TRACK: [facility]    NUMBER OF PARTICIPANTS: 3    Summary of Group / Topics Discussed:  Sensory Approaches in Mental Health: Sensory: Patients were taught and provided with an opportunity to explore and practice how using sensory practices can help them stay grounded and as a way to manage mental health symptoms and stressors.  Patients explored the 8 different senses around their environment and identified sensory stimuli that they prefer and can be helpful for grounding and self-regulation. Patients discussed their preferences and aversions with peers. Validation, support, and feedback provided throughout group.     Patient Session Goals / Objectives:   Identified how using sensory practices can be used for grounding, stress management, and self-regulation.    Improved awareness of different types of sensory activities that assist with healthy coping of stress and symptoms.     Established a plan for practice of these skills in their own environments.   Practiced and reflected on how to generalize taught skills to their everyday life.        Patient Participation / Response:  Fully participated with the group by sharing personal reflections / insights and openly received / provided feedback with other participants.    Demonstrated understanding of content through exploration of the 8 senses and verbalized personalized plans for using sensory strategies for stressful life events.  Patient's affect was appropriate to situation and mood congruent.     Treatment Plan:  Patient has an initial individualized treatment plan that was created as part of their diagnostic assessment /  admission process.  A master individualized treatment plan is in the process of being developed with the patient and multi-disciplinary care team.    Osiris Morrison OT

## 2024-10-28 NOTE — GROUP NOTE
Process Group Note    PATIENT'S NAME: Ambar oBrges  MRN:   [id number]  :   1975  ACCT. NUMBER: [id number]  DATE OF SERVICE: 10/28/24  START TIME: 11:00 AM  END TIME: 11:50 AM  FACILITATOR: Jaren Atwood TH  TOPIC:  Process Group    Diagnoses:  Psychosis, unspecified psychosis type (H)     M [facility] Adult Partial Hospitalization Program  TRACK:  [facility]     NUMBER OF PARTICIPANTS: 3        Data:    Session content: At the start of this group, patients were invited to check in by identifying themselves, describing their current emotional status, and identifying issues to address in this group.   Area(s) of treatment focus addressed in this session included Symptom Management, Personal Safety, and Community Resources/Discharge Planning.  Patient arrived on time for group and engaged with the prompted check-in questions pertaining to emotions, goals, boundaries, barriers, safety concerns, chemical use, medication management, and group feedback, and stated the following:    Feeling:  Pt reported feeling anxious and calm at the same time. Pt reported better overall the past week.   Treatment Goal: trying not to be self-conscious.   Skills: grounding, opposite to emotion, using coping skills.   Barriers:  Pt is afraid of self-sabotage, negative thinking patterns.   Safety concerns:  Pt denied safety concerns.   Chemicals:  Pt denied taking any chemicals.   Meds:  taking meds as prescribed.   Grateful: Family, weather, and the PHP Group.  Support:  Pt is open to support or feedback.   Feedback: Yes  Processing Time:  No    Therapeutic Interventions/Treatment Strategies:  Psychotherapist offered support, feedback and validation and reinforced use of skills. Treatment modalities used include Motivational Interviewing and Cognitive Behavioral Therapy.    Assessment:    Patient response:   Patient responded to session by accepting feedback, giving feedback, listening, and focusing on goals    Possible  barriers to participation / learning include: severity of symptoms    Health Issues:   None reported       Substance Use Review:   Substance Use: No active concerns identified.    Mental Status/Behavioral Observations  Appearance:   Appropriate   Eye Contact:   Good   Psychomotor Behavior: Normal   Attitude:   Cooperative   Orientation:   All  Speech   Rate / Production: Normal    Volume:  Normal   Mood:    Anxious  Normal  Affect:    Appropriate   Thought Content:   Clear  Thought Form:  Coherent  Logical     Insight:    Good     Plan:   Safety Plan: No current safety concerns identified.  Recommended that patient call 911 or go to the local ED should there be a change in any of these risk factors.   Barriers to treatment:  Pt is afraid of self-sabotage, negative thinking patterns.   Patient Contracts (see media tab):  None  Substance Use: Not addressed in session   Continue or Discharge: Patient will continue in Adult Partial Hospitalization Program (PHP)  as planned. Patient is likely to benefit from learning and using skills as they work toward the goals identified in their treatment plan.      Jaren Atwood, TH  October 28, 2024

## 2024-10-28 NOTE — GROUP NOTE
Psychotherapy Group Note    PATIENT'S NAME: Ambar Borges  MRN:   [id number]  :   1975  ACCT. NUMBER: [id number]  DATE OF SERVICE: 10/28/24  START TIME: 12:00 PM  END TIME: 12:50 PM  FACILITATOR: Dez Arias TH; Megan Lyon LPCC  TOPIC: MH EBP Group: Social Support  [facility] Adult Partial Hospitalization Program  TRACK: [facility]    NUMBER OF PARTICIPANTS: 3    Summary of Group / Topics Discussed:  Social Support:  Building and educating social support systems: The patients engaged in a discussion of how their mental health symptoms are related to psychosocial impairment. The patients also shared experiences of when stigma associated with mental illness has created barriers between them and their social network. The patients benefitted from this group by exploring ways in which they can re-engage their social network to decrease feelings of isolation and loneliness.    Patient Session Goals / Objectives:  Reduce overidentification/fusion with mental illness as being a primary definer of identity.   Identify ways that support network can assist with recovery.   Reduce stigma associated with mental health diagnosis(es).  Improve interpersonal communication regarding symptoms  Build a sense of mastery and self-respect      Patient Participation / Response:  Fully participated with the group by sharing personal reflections / insights and openly received / provided feedback with other participants.    Patient was present and engaged throughout the group. She participated in group discussion and activity.     Treatment Plan:  Patient has a current master individualized treatment plan.  See Epic treatment plan for more information.    TASHA Armstrong

## 2024-10-28 NOTE — GROUP NOTE
Psychoeducation Group Note    PATIENT'S NAME: Ambar Borges  MRN:   [id number]  :   1975  ACCT. NUMBER: [id number]  DATE OF SERVICE: 10/28/24  START TIME:  8:30 AM  END TIME:  9:20 AM  FACILITATOR: Bentley Alcala RN  TOPIC:  Wellness Group: Mental Health Maintenance  [facility] Adult Partial Hospitalization Program  TRACK: [facility]    NUMBER OF PARTICIPANTS: 3    Summary of Group / Topics Discussed:  Mental Health Maintenance:  Assessments of Strengths: Patients completed a self-reflection on personal strengths worksheet. The concept of personal strength as it relates to resilience were explored. Patients shared responses with the group and participated in discussion.     Patient Session Goals / Objectives:  Patients identified 1-3 qualities they consider a personal strength.  Patients understood the concept of personal strengths and the connection it has to resiliency      Patient Participation / Response:  Fully participated with the group by sharing personal reflections / insights and openly received / provided feedback with other participants.    Demonstrated understanding of topics discussed through group discussion and participation, Identified / Expressed personal readiness to practice skills, and Verbalized understanding of mental health maintenance topic    Treatment Plan:  Patient has a current master individualized treatment plan.  See Epic treatment plan for more information.    Bentley Alcala, RN

## 2024-10-29 ENCOUNTER — HOSPITAL ENCOUNTER (OUTPATIENT)
Dept: BEHAVIORAL HEALTH | Facility: CLINIC | Age: 49
Discharge: HOME | End: 2024-10-29
Attending: PSYCHIATRY & NEUROLOGY
Payer: COMMERCIAL

## 2024-10-29 DIAGNOSIS — F41.9 ANXIETY: Primary | ICD-10-CM

## 2024-10-29 PROCEDURE — H0035 MH PARTIAL HOSP TX UNDER 24H: HCPCS | Performed by: COUNSELOR

## 2024-10-29 PROCEDURE — H0035 MH PARTIAL HOSP TX UNDER 24H: HCPCS

## 2024-10-29 PROCEDURE — 99214 OFFICE O/P EST MOD 30 MIN: CPT | Performed by: PSYCHIATRY & NEUROLOGY

## 2024-10-29 RX ORDER — VENLAFAXINE HYDROCHLORIDE 75 MG/1
75 CAPSULE, EXTENDED RELEASE ORAL DAILY
Qty: 30 CAPSULE | Refills: 0 | Status: SHIPPED | OUTPATIENT
Start: 2024-10-29

## 2024-10-29 RX ORDER — VENLAFAXINE HYDROCHLORIDE 75 MG/1
75 CAPSULE, EXTENDED RELEASE ORAL DAILY
Qty: 30 CAPSULE | Refills: 0 | COMMUNITY
Start: 2024-10-29

## 2024-10-29 NOTE — GROUP NOTE
Process Group Note    PATIENT'S NAME: Ambar Borges  MRN:   [id number]  :   1975  ACCT. NUMBER: [id number]  DATE OF SERVICE: 10/29/24  START TIME: 11:00 AM  END TIME: 11:50 AM  FACILITATOR: Dez Arias ; Megan Lyon LPCC  TOPIC:  Process Group    Diagnoses:  KRYSTIN severe  Panic disorder per history  MDD, recurrent, moderate with no psychosis  Alcohol use disorder, moderate  Opioid use disorder, severe in remission with suboxone    [facility] Adult Partial Hospitalization Program  TRACK: [facility]    NUMBER OF PARTICIPANTS: 4        Data:    Session content: At the start of this group, patients were invited to check in by identifying themselves, describing their current emotional status, and identifying issues to address in this group.   Area(s) of treatment focus addressed in this session included Symptom Management.      Therapeutic Interventions/Treatment Strategies:  Treatment modalities used include Motivational Interviewing.    Assessment:    Patient response:   Patient responded to session by accepting feedback, giving feedback, listening, focusing on goals, being attentive, accepting support, appearing alert, demonstrating behavior change, and verbalizing understanding    Possible barriers to participation / learning include: and no barriers identified    Health Issues:   None reported       Substance Use Review:   Substance Use: No active concerns identified.    Mental Status/Behavioral Observations  Appearance:   Appropriate   Eye Contact:   Good   Psychomotor Behavior: Normal   Attitude:   Cooperative   Orientation:   All  Speech   Rate / Production: Normal    Volume:  Normal   Mood:    Normal  Affect:    Appropriate   Thought Content:   Clear  Thought Form:  Coherent  Logical     Insight:    Fair     Plan:   Safety Plan: No current safety concerns identified.  Recommended that patient call 911 or go to the local ED should there be a change in any of these risk factors.    Barriers to treatment:  Patient denies substance use in group but has been identified with psychiatrist.   Patient Contracts (see media tab):  None  Substance Use: Not addressed in session   Continue or Discharge: Patient will continue in Adult Partial Hospitalization Program (PHP)  as planned. Patient is likely to benefit from learning and using skills as they work toward the goals identified in their treatment plan.    Patient reported feeling empathy despite having a panic attack previously.   Patient discussed working toward not overanalyzing everything and develop a better nighttime routine.    Skills they plan to practice to address the goal include being aware of what is real and what is not.   They identified a potential barrier as believing I am in danger when I am not.    Patient reported no safety concerns or SIB  Patient reported no substance use  Patient reported taking medications as prescribed  Patient reported feeling proud/grateful for the group.        Dez Arias,   October 29, 2024

## 2024-10-29 NOTE — PROGRESS NOTES
"[facility]   Adult Mental Health Outpatient Programs  Psychiatric Progress Record    Program Track: PHP [place]    PATIENT'S NAME: Ambar Borges  MRN:   [id number]  :   1975  ACCT. NUMBER: [id number]  DATE OF SERVICE: 10/29/24    Interval History:  \"The voices have been active and worsen though out the day.\" Ambar presents today for follow-up and ongoing program supervision.   Endorses:  Still having high anxiety and talking to herself.  She admits the voices are not outside her head but her own thoughts.  She states she has talked to herself for some time.    She wakes up in the morning and they are not there, but she admits she \"looks for them\"  Then they start.  It gets worse throughout the day  She is still drinking 1-2 drinks at night.  She tells herself that it was a hard day and she can use it.  She thinks she can stop on her own.  She sometimes will have a glass of water and pretend it is a beer.    She states she did not use a third klonopin over the last week.  She takes a tab in the morning and usually later in the day    Review of Symptoms  Sleep: ok  Appetite: ok  Suicidal ideation: denies current or recent suicidal ideation or behavior  Thoughts of non-suicidal self-injury: denied  Recent self-injurious behavior: denied  Homicidal ideation: denied  Other safety concerns: denied    Activities of Daily Living and Related Systems Impacted by Illness:  Hygiene: ok  Socialization: hard, lonely  Activities of Daily Living: (cleaning, shopping, bills, etc.): does some but mostly   Concerns related to work: n/a    Substance use:  ETOH every night.  Per team, in group she states she is not drinking at night.  Concerns that Ambar is not giving full story to group or myself    Response to Program Interventions:  Learning new skills, coming every day which is helpful and keeps her accountable    Medications:  Current Outpatient Medications   Medication Sig Dispense Refill    B Complex " Vitamins (VITAMIN  B COMPLEX) tablet Take 1 tablet by mouth daily.      buprenorphine HCl-naloxone HCl (SUBOXONE) 8-2 MG per film Place 1 Film under the tongue 2 times daily as needed (symptoms).      clonazePAM (KLONOPIN) 0.5 MG tablet Take 0.5 mg by mouth 3 times daily as needed for anxiety.      ibuprofen (ADVIL) 200 MG capsule Take 600 mg by mouth 2 times daily as needed for mild pain.      levothyroxine (SYNTHROID, LEVOTHROID) 150 MCG tablet Take 1 tablet by mouth daily. 90 tablet 3    OLANZapine (ZYPREXA) 5 MG tablet Take 1 tablet (5 mg) by mouth 2 times daily. 7 tablet 0    OLANZapine (ZYPREXA) 5 MG tablet Take 1 tablet (5 mg) by mouth 2 times daily. 60 tablet 1    progesterone (PROMETRIUM) 100 MG capsule Take 200 mg by mouth at bedtime.      Turmeric (QC TUMERIC COMPLEX PO) Take 1 capsule by mouth daily.      venlafaxine (EFFEXOR XR) 37.5 MG 24 hr capsule Take 37.5 mg by mouth daily.         The above list was reviewed and updated in EPIC with patient today.     Patient is taking medications as prescribed and denies adverse effects.  Concerns that she is taking more klonopin than she is stating.  She mentioned today that the addiction med doctor has a different taper plan but could not tell me what it was.  She tends to get vague about the amount she takes.      Laboratory Results:  Most recent labs reviewed. Pertinent updates/findings: None.     Mental Status Examination:  Vital Signs: [facility] 03/15/2012    Appearance: adequately groomed, appears stated age, and in no apparent distress.  Attitude: cooperative   Eye Contact: good   Muscle Strength and Tone: normal  Psychomotor Behavior: normal or unremarkable   Gait and Station: normal width, turn, arm swing  Speech: clear, coherent, decreased prosody, regular rate, regular rhythm, and fluent  Associations: No loosening of associations  Thought Process: coherent and goal directed  Thought Content: no evidence of suicidal ideation or homicidal ideation, no  "evidence of psychotic thought, no auditory hallucinations present, and no visual hallucinations present  Mood: \"anxious\"  Affect: mood congruent  Insight: fair  Judgment: fair, adequate for safety  Impulse Control: intact  Oriented to: time, place, person, and situation  Attention Span and Concentration: Normal  Language: Intact  Recent and Remote Memory: Delayed & immediate recall intact  Fund of Knowledge/Assessment of Intelligence: Average  Capacity of Activities of Daily Living: Independent, able to participate in programmatic care services.    Diagnosis/es:    KRYSTIN severe  Panic disorder per history  MDD, recurrent, moderate with no psychosis  Alcohol use disorder, moderate  Opioid use disorder, severe in remission with suboxone      Assessment/Plan:  Ambar presents today for follow-up psychiatric evaluation and assessment of progress. Endorses still having discussion in her head that often are of a negative tone.  She is still struggling to give the full story of her drinking, taking clonazepam and anxiety.  It seems she will disclose something to one team member and then not to another.  This seems stemmed from her long history of substance use and her high anxiety.  There may be some cluster B personality issues in plan as well.  We will attempt to get the addiction medicine providers notes.  Will make sure that an HENRI is signed.    Anxiety  Overall improved   Anxiety is still high but has decreased some in that pt can follow groups and keep focus which was not the case a few weeks ago  Will increase effexor xr to 75 mg once a day.  Hopefully this increase can start to improve the anxiety making it easier to get off the klonopin  The goal for the klonopin use is still to take 0.5 mg bid    Depression  Overall improved  Continue to work the program and take medications as prescribed    Alcohol use  Overall unchanged  Continue to offer more assistance in stopping drinking.    Working on honesty of use and some " behavioral modifications to help not have a drink  Discussed having  get rid of the alcohol in the house.  Pt stated that he would do this, but was not sure she would ask    Safety Assessment:  Ambar reports suicidal ideation and/or non-suicidal self-injury or thoughts thereof as noted above  Ambar is future-oriented and is engaged in treatment planning   I do not feel that Ambar meets criteria for a 72-hour involuntary hold and remains appropriate for an outpatient level of care    Abe Hussein MD on 10/29/2024 at 12:05 PM    Visit Details:  Type of service: In-person  Location (patient and provider): [facility] Adult Mental Health Outpatient Programmatic Care Offices    Level of Medical Decision Making:   - At least 1 chronic problem that is not stable  - Engaged in prescription drug management during visit (discussed any medication benefits, side effects, alternatives, etc.)  Discussion of management or test interpretation with external physician/other qualified healthcare professional/appropriate source - programmatic care multidisciplinary treatment team          This document completed in part using [employer] dictation software and therefore may contain inadvertent word or phrase substitutions.

## 2024-10-29 NOTE — GROUP NOTE
Psychoeducation Group Note    PATIENT'S NAME: Ambar Borges  MRN:   [id number]  :   1975  ACCT. NUMBER: [id number]  DATE OF SERVICE: 10/29/24  START TIME:  8:30 AM  END TIME:  9:20 AM  FACILITATOR: Bentley Alcala RN  TOPIC:  Wellness Group: [facility] Adult Partial Hospitalization Program  TRACK: [facility]    NUMBER OF PARTICIPANTS: 4    Summary of Group / Topics Discussed:  Foundations of Health: Nutrition: Macronutrients: Patient were provided education on major macronutrients, their role in the body, and why it is important to meet daily nutritional needs. Obstacles to meeting daily nutritional needs were identified in group discussion and strategies to promote improved nutrition were explored. Macronutrients discussed include: carbohydrates, proteins and amino acids, fats, fiber, and water.     Patient Session Goals / Objectives:  Discussed the role of diet on mood, physical health, energy level, and the development of chronic disease.  Identified daily nutritional needs recommended by the [employer] via [employer] education resources  Developing increased health literacy skills in finding credible nutrition information from reliable sources      Patient Participation / Response:  Fully participated with the group by sharing personal reflections / insights and openly received / provided feedback with other participants.    Demonstrated understanding of topics discussed through group discussion and participation, Identified / Expressed personal readiness to practice skills, and Verbalized understanding of foundations of health topic    Treatment Plan:  Patient has a current master individualized treatment plan.  See Epic treatment plan for more information.    Bentley Alcala RN

## 2024-10-29 NOTE — GROUP NOTE
Psychotherapy Group Note    PATIENT'S NAME: Ambar Borges  MRN:   [id number]  :   1975  ACCT. NUMBER: [id number]  DATE OF SERVICE: 10/29/24  START TIME: 12:00 PM  END TIME: 12:50 PM  FACILITATOR: Jaren Atwood TH; Bentley Alcala RN  TOPIC: MH EBP Group: Specialty Awareness  [facility] Adult Partial Hospitalization Program  TRACK: [place] PHP/IOP 2 Combined    NUMBER OF PARTICIPANTS: 7    Summary of Group / Topics Discussed:  Specialty Topics: Goal Setting: Provided education and assessment on patient's group programming goals. Evaluated patient's assessment of their ability to participate in groups, share emotions with group members, and openness to the group format. Provided education regarding appropriate individual-based group therapy goals.     Patient Session Goals and Objectives:  -Participate in reflective assessment of group readiness.  -Understand appropriate topics and methods to discuss those topics in group programming.  -Identify and problem solve barriers to group participation.  -Identify strategies to actively cope while engaging in group programming.   -Reflected up individualized treatment plans  -Meet with members of the treatment team to assess goal progress       Patient Participation / Response:  Fully participated with the group by sharing personal reflections / insights and openly received / provided feedback with other participants.    Demonstrated understanding of topics discussed through group discussion and participation, Identified / Expressed readiness to act on skill suggestions discussed in topic, and Verbalized understanding of ways to proactively manage illness    Treatment Plan:  Patient has a current master individualized treatment plan.  See Epic treatment plan for more information.    TASHA Syed

## 2024-10-30 ENCOUNTER — HOSPITAL ENCOUNTER (OUTPATIENT)
Dept: BEHAVIORAL HEALTH | Facility: CLINIC | Age: 49
Discharge: HOME | End: 2024-10-30
Attending: PSYCHIATRY & NEUROLOGY
Payer: COMMERCIAL

## 2024-10-30 PROCEDURE — H0035 MH PARTIAL HOSP TX UNDER 24H: HCPCS

## 2024-10-30 PROCEDURE — H0035 MH PARTIAL HOSP TX UNDER 24H: HCPCS | Performed by: COUNSELOR

## 2024-10-30 NOTE — GROUP NOTE
Psychoeducation Group Note    PATIENT'S NAME: Ambar Borges  MRN:   [id number]  :   1975  ACCT. NUMBER: [id number]  DATE OF SERVICE: 10/28/24  START TIME:  9:30 AM  END TIME: 10:20 AM  FACILITATOR: Osiris Morrison OT  TOPIC: MH Life Skills Group: Sensory Approaches in Mental Health  [facility] Adult Partial Hospitalization Program  TRACK: [facility]    NUMBER OF PARTICIPANTS: 3    Summary of Group / Topics Discussed:  Sensory Approaches in Mental Health: Sensory: Patients were taught and provided with an opportunity to explore and practice how using sensory practices can help them stay grounded and as a way to manage mental health symptoms and stressors.  Patients explored the 8 different senses around their environment and identified sensory stimuli that they prefer and can be helpful for grounding and self-regulation. Patients discussed their preferences and aversions with peers. Validation, support, and feedback provided throughout group.     Patient Session Goals / Objectives:   Identified how using sensory practices can be used for grounding, stress management, and self-regulation.    Improved awareness of different types of sensory activities that assist with healthy coping of stress and symptoms.     Established a plan for practice of these skills in their own environments.   Practiced and reflected on how to generalize taught skills to their everyday life.      Patient Participation / Response:  Fully participated with the group by sharing personal reflections / insights and openly received / provided feedback with other participants.    Demonstrated understanding of content through exploration of the 8 senses and verbalized personalized plans for using sensory strategies for stressful life events. Patient's affect was appropriate to situation and mood congruent.       Treatment Plan:  Patient has a current master individualized treatment plan.  See Epic treatment plan for more  information.    Osiris Morrison, OT

## 2024-10-30 NOTE — GROUP NOTE
"Process Group Note    PATIENT'S NAME: Ambar Borges  MRN:   [id number]  :   1975  ACCT. NUMBER: [id number]  DATE OF SERVICE: 10/30/24  START TIME: 11:00 AM  END TIME: 11:50 AM  FACILITATOR: Megan Lyon LPCC; Dez Arias  TOPIC:  Process Group    Diagnoses:  KRYSTIN severe  Panic disorder per history  MDD, recurrent, moderate with no psychosis  Alcohol use disorder, moderate  Opioid use disorder, severe in remission with suboxone    [facility] Adult Partial Hospitalization Program  TRACK: [facility]    NUMBER OF PARTICIPANTS: 6        Data:    Session content: At the start of this group, patients were invited to check in by identifying themselves, describing their current emotional status, and identifying issues to address in this group.   Area(s) of treatment focus addressed in this session included Symptom Management and Personal Safety.    Patient reported feeling 'good, confident, rested.'   Patient discussed working toward being in group.   Skills they plan to practice to address the goal include being calm, centered.   They identified a potential barrier as severe social anxiety.   Patient reported no safety concerns or SIB.  Patient reported no substance use.  Patient reported taking medications as prescribed.  Patient reported feeling proud/grateful for her family and .       Therapeutic Interventions/Treatment Strategies:  Psychotherapist offered support, feedback and validation, set limits, provided redirection, and reinforced use of skills. Treatment modalities used include Dialectical Behavioral Therapy. Interventions include Coping Skills: Discussed how the use of intentional \"in the moment\" actions can help reduce distress and Addressed barriers to utilizing coping skills when in distress.    Assessment:    Patient response:   Patient responded to session by accepting feedback, giving feedback, listening, focusing on goals, being attentive, accepting support, and " verbalizing understanding    Possible barriers to participation / learning include: and no barriers identified    Health Issues:   None reported       Substance Use Review:   Substance Use: alcohol .     Mental Status/Behavioral Observations  Appearance:   Appropriate   Eye Contact:   Good   Psychomotor Behavior: Restless   Attitude:   Cooperative   Orientation:   All  Speech   Rate / Production: Normal    Volume:  Normal   Mood:    Anxious   Affect:    Appropriate   Thought Content:   Rumination  Thought Form:  Coherent  Logical     Insight:    Fair     Plan:   Safety Plan: No current safety concerns identified.  Recommended that patient call 911 or go to the local ED should there be a change in any of these risk factors.   Barriers to treatment: None identified  Patient Contracts (see media tab):  None  Substance Use: Not addressed in session   Continue or Discharge: Patient will continue in Adult Partial Hospitalization Program (PHP)  as planned. Patient is likely to benefit from learning and using skills as they work toward the goals identified in their treatment plan.      Megan Lyon, ABNER  October 30, 2024

## 2024-10-30 NOTE — GROUP NOTE
Psychoeducation Group Note    PATIENT'S NAME: Ambar Borges  MRN:   [id number]  :   1975  ACCT. NUMBER: [id number]  DATE OF SERVICE: 10/30/24  START TIME:  9:30 AM  END TIME: 10:20 AM  FACILITATOR: Bentley Alcala RN  TOPIC:  Wellness Group: [facility] Adult Partial Hospitalization Program  TRACK: [facility]    NUMBER OF PARTICIPANTS: 4    Summary of Group / Topics Discussed:  Foundations of Health: Nutrition: Super Nutrients & Micronutrients: Super Nutrients are Foods that have high nutritional yield. Micronutrients are essential elements found in food or taken through supplements that are necessary for normal physiological functioning. This group was designed to complement the macronutrients group and build upon previous education. The changes that food makes on the brain (how the brain uses sugar) and nutrition as it relates to mental health was also discussed.       Patient Session Goals / Objectives:  Identified the health enhancing benefits to good nutrition  Verbalized ways in which the food we eat affects the brain  Explained the role of micronutrients in the body, how much we need, and how to get it      Patient Participation / Response:  Fully participated with the group by sharing personal reflections / insights and openly received / provided feedback with other participants.    Demonstrated understanding of topics discussed through group discussion and participation, Identified / Expressed personal readiness to practice skills, and Verbalized understanding of foundations of health topic    Treatment Plan:  Patient has a current master individualized treatment plan.  See Epic treatment plan for more information.    Bentley Alcala RN

## 2024-10-30 NOTE — GROUP NOTE
Psychotherapy Group Note    PATIENT'S NAME: Ambar Borges  MRN:   [id number]  :   1975  ACCT. NUMBER: [id number]  DATE OF SERVICE: 10/30/24  START TIME:  1:00 PM  END TIME:  1:50 PM  FACILITATOR: Megan Lyon LPCC; Dez Arias TH  TOPIC: MH EBP Group: Coping Skills  [facility] Mental Health Outpatient Programs  TRACK: [facility]    NUMBER OF PARTICIPANTS: 4    Summary of Group / Topics Discussed:  Coping Skills: Relapse Planning: Patients discussed and increased understanding of how anticipating and planning for possible increased symptoms is a proactive way to reduce the likelihood of relapse.  Patients shared individualized factors that may lead to increased symptoms and decompensation in functioning.  Each patient developed a relapse prevention plan designed to help them recognize when they may have increasing symptoms requiring them to take action and notify their key supports and care team.      Patient Session Goals / Objectives:  Identify and understand what factors may contribute to symptom relapse.  Identify actions that may be taken to manage increased symptoms/stressors.  Create an individualized written relapse plan  Problem solve barriers to plan creation and implementation  Share relapse plan with key support people      Patient Participation / Response:  Fully participated with the group by sharing personal reflections / insights and openly received / provided feedback with other participants.    Demonstrated understanding of topics discussed through group discussion and participation, Expressed understanding of the relevance / importance of coping skills at distressing times in life, Demonstrated knowledge of when to consider using a variety of coping skills in daily life, Identified barriers to applying coping skills, Identified 2-3 positive coping strategies that have helped maintain / improve symptoms in the past, and Identified / Expressed personal readiness to  practice new coping skills    Treatment Plan:  Patient has a current master individualized treatment plan.  See Epic treatment plan for more information.    Dez Arias TH

## 2024-10-30 NOTE — GROUP NOTE
Psychoeducation Group Note    PATIENT'S NAME: Ambar Borges  MRN:   [id number]  :   1975  ACCT. NUMBER: [id number]  DATE OF SERVICE: 10/30/24  START TIME:  8:30 AM  END TIME:  9:20 AM  FACILITATOR: Osiris Morrison OT  TOPIC:  Life Skills Group: Lifestyle Balance and Structure  [facility] Adult Partial Hospitalization Program  TRACK: [facility]    NUMBER OF PARTICIPANTS: 3    Summary of Group / Topics Discussed:  Lifestyle Balance and Structure: Self-care: Reviewed education on the framework of life balance that includes three intersecting circles (productivity, self-care, and leisure.)  Facilitated collaborative discussion about self-care including why it is important, barriers to engaging in it, and examples of current self-care practice. Brainstormed with the group to develop a list of self-care activities that meet the needs of one's mind, body, or spirit.  Educated on the importance of approaching self-care with self-compassion and nacho.  Discussed possible benefits of engaging in self-care including improved confidence, increased sense of structure and routine, and feeling rested.  Instructed group members to select one or two self-care activities they would like to explore in the coming weeks and provided an opportunity to brainstorm strategies for success.       Patient Session Goals / Objectives:   Reflected on self-care benefits and barriers through a group brainstorming process.    Identified areas of self-care that are a current priority and set realistic goals for these areas.   Identify strategies and skills to meet specific needs and address barriers.   Reflected on their current self-care engagement and shared insight about their progress.        Patient Participation / Response:  Fully participated with the group by sharing personal reflections / insights and openly received / provided feedback with other participants.    Demonstrated understanding of content through reflecting on  self-care activities that can be added to daily routine. Patient's affect was appropriate to situation and mood congruent.       Treatment Plan:  Patient has a current master individualized treatment plan.  See Epic treatment plan for more information.    Osiris Morrison OT

## 2024-10-30 NOTE — GROUP NOTE
Psychotherapy Group Note    PATIENT'S NAME: Ambar Borges  MRN:   [id number]  :   1975  ACCT. NUMBER: [id number]  DATE OF SERVICE: 10/30/24  START TIME: 12:00 PM  END TIME: 12:50 PM  FACILITATOR: Jaren Atwood TH  TOPIC: MH EBP Group: Specialty Awareness  [facility] Adult Partial Hospitalization Program  TRACK: [facility]/IOP 2 Combined    NUMBER OF PARTICIPANTS: 6    Summary of Group / Topics Discussed:  Specialty Topics: Hope: The topic of hope was presented in order to help patients better understand the symptoms of hopelessness and how to become more hopeful. Patients discussed their current awareness of the topic and relevance to their functioning. Individual experiences with symptoms and treatment options were also discussed. Patients explored options for ongoing/future treatment and symptom management.      Patient Session Goals / Objectives:  Discussed definition of hopelessness  Discussed how hopelessness impacts functioning  Set a plan to utilize skills to reduce hopelessness      Patient Participation / Response:  Fully participated with the group by sharing personal reflections / insights and openly received / provided feedback with other participants.    Demonstrated understanding of topics discussed through group discussion and participation, Identified / Expressed readiness to act on skill suggestions discussed in topic, and Verbalized understanding of ways to proactively manage illness    Treatment Plan:  Patient has a current master individualized treatment plan.  See Epic treatment plan for more information.    TASHA Syed

## 2024-10-30 NOTE — ADDENDUM NOTE
Encounter addended by: Osiris Morrison OT on: 10/30/2024 9:39 AM   Actions taken: Delete clinical note

## 2024-10-30 NOTE — ADDENDUM NOTE
Encounter addended by: Osiris Morrison OT on: 10/30/2024 9:41 AM   Actions taken: Clinical Note Signed

## 2024-10-31 ENCOUNTER — HOSPITAL ENCOUNTER (OUTPATIENT)
Dept: BEHAVIORAL HEALTH | Facility: CLINIC | Age: 49
Discharge: HOME | End: 2024-10-31
Attending: PSYCHIATRY & NEUROLOGY
Payer: COMMERCIAL

## 2024-10-31 PROCEDURE — H0035 MH PARTIAL HOSP TX UNDER 24H: HCPCS

## 2024-10-31 PROCEDURE — H0035 MH PARTIAL HOSP TX UNDER 24H: HCPCS | Performed by: COUNSELOR

## 2024-10-31 NOTE — GROUP NOTE
Process Group Note    PATIENT'S NAME: Ambar Borges  MRN:   [id number]  :   1975  ACCT. NUMBER: [id number]  DATE OF SERVICE: 10/31/24  START TIME: 11:00 AM  END TIME: 11:50 AM  FACILITATOR: Jaren Atwood TH  TOPIC:  Process Group    Diagnoses:  Psychosis, unspecified psychosis type (H)     [facility] Adult Partial Hospitalization Program  TRACK: [facility]/IOP 2 Combined    NUMBER OF PARTICIPANTS: 5        Data:    Session content: At the start of this group, patients were invited to check in by identifying themselves, describing their current emotional status, and identifying issues to address in this group.   Area(s) of treatment focus addressed in this session included Symptom Management, Personal Safety, and Community Resources/Discharge Planning.  Patient arrived on time for group and engaged with the prompted check-in questions pertaining to emotions, goals, boundaries, barriers, safety concerns, chemical use, medication management, and group feedback, and stated the following:    Feeling:  Pt reported feeling worry due to feeling taken aback for feeling good today.  Pt reported sleeping from 3am to 6am.   Treatment Goal:  Pt reported to think more positive thoughts.   Skills:  opposite to emotion, attend group, mindfulness. Self-compassion.   Barriers:   Pt reported self-sabotage, excessive worrying.   Safety concerns:  Denied  Chemicals:  Denied  Meds:  Pt reported taking medication as prescribed.   Grateful: For the IOP Group.   Support:    Pt reported the group support.   Feedback: Open to feedback.   Processing Time:  No    Therapeutic Interventions/Treatment Strategies:  Psychotherapist offered support, feedback and validation and reinforced use of skills. Treatment modalities used include Motivational Interviewing and Cognitive Behavioral Therapy.    Assessment:    Patient response:   Patient responded to session by accepting feedback, giving feedback, listening, focusing on goals,  being attentive, and accepting support    Possible barriers to participation / learning include: severity of symptoms    Health Issues:   None reported       Substance Use Review:   Substance Use: No active concerns identified.    Mental Status/Behavioral Observations  Appearance:   Appropriate   Eye Contact:   Good   Psychomotor Behavior: Normal   Attitude:   Cooperative   Orientation:   All  Speech   Rate / Production: Normal    Volume:  Normal   Mood:    Normal  Affect:    Appropriate   Thought Content:   Clear  Thought Form:  Coherent  Logical     Insight:    Good     Plan:   Safety Plan: Committed to safety and agreed to follow previously developed safety coping plan.    Barriers to treatment:  Pt reported self-sabotage, excessive worrying.  Patient Contracts (see media tab):  None  Substance Use: Not addressed in session   Continue or Discharge: Patient will continue in Adult Partial Hospitalization Program (PHP)  as planned. Patient is likely to benefit from learning and using skills as they work toward the goals identified in their treatment plan.      Jaern Atwood,   October 31, 2024

## 2024-10-31 NOTE — ADDENDUM NOTE
Encounter addended by: Dez Arias,  on: 10/31/2024 2:29 PM   Actions taken: Clinical Note Signed, Charge Capture section accepted

## 2024-10-31 NOTE — GROUP NOTE
Psychotherapy Group Note    PATIENT'S NAME: Ambar Borges  MRN:   [id number]  :   1975  ACCT. NUMBER: [id number]  DATE OF SERVICE: 10/31/24  START TIME: 12:00 PM  END TIME: 12:50 PM  FACILITATOR: Dez Arias ; Megan Lyon LPCC  TOPIC: MH EBP Group: Self-Awareness  [facility] Partial Hospitalization Program  TRACK: [facility]    NUMBER OF PARTICIPANTS: 3    Summary of Group / Topics Discussed:  Self-Awareness: Values: Patients identified personal values by examining development of their current values and how their values influence their daily functioning and life choices. Patients explored the impact of their values on their thoughts, feelings, and actions. Patients discussed definition of personal values and how they develop and change over time. The goal is to help patients reconcile value conflicts and achieve balance and flexibility to improve mood and daily functioning.     Patient Session Goals / Objectives:  Examined development of values and impact of values on functioning  Identified and prioritized important values related to current well-being   Identified strategies to change or enhance values to positively impact symptoms  Assisted patients to find ways to adapt functioning to better fit their values      Patient Participation / Response:  Fully participated with the group by sharing personal reflections / insights and openly received / provided feedback with other participants.    Demonstrated understanding of topics discussed through group discussion and participation, Demonstrated understanding of values, strengths, and challenges to learn about themselves, Identified / Expressed readiness to act intentionally, increase self-compassion, promote personal growth, Verbalized understanding of ways to proactively manage illness, and Identified plan to address barriers to practicing skills that promote self-awareness     Treatment Plan:  Patient has a current master  individualized treatment plan.  See Epic treatment plan for more information.    Dez Arias, TH

## 2024-10-31 NOTE — GROUP NOTE
BD Dexa Axial Skeleton order extended.    PSR: Please notify patient. Thank you.   Process Group Note     PATIENT'S NAME:    Ambar Borges  MRN:                           [id number]  :                           1975  ACCT. NUMBER:       [id number]  DATE OF SERVICE:  10/29/24  START TIME: 11:00 AM  END TIME: 11:50 AM  FACILITATOR: Dez Arias ; Megan Lyon LPCC  TOPIC:  Process Group     Diagnoses:  KRYSTIN severe  Panic disorder per history  MDD, recurrent, moderate with no psychosis  Alcohol use disorder, moderate  Opioid use disorder, severe in remission with suboxone     [facility] Adult Partial Hospitalization Program  TRACK: [facility]     NUMBER OF PARTICIPANTS: 4           Data:     Session content: At the start of this group, patients were invited to check in by identifying themselves, describing their current emotional status, and identifying issues to address in this group.   Area(s) of treatment focus addressed in this session included Symptom Management.        Therapeutic Interventions/Treatment Strategies:  Treatment modalities used include Motivational Interviewing.     Assessment:     Patient response:   Patient responded to session by accepting feedback, giving feedback, listening, focusing on goals, being attentive, accepting support, appearing alert, demonstrating behavior change, and verbalizing understanding     Possible barriers to participation / learning include: and no barriers identified     Health Issues:              None reported                  Substance Use Review:              Substance Use: No active concerns identified.     Mental Status/Behavioral Observations  Appearance:                            Appropriate   Eye Contact:                           Good   Psychomotor Behavior:          Normal   Attitude:                                   Cooperative   Orientation:                             All  Speech              Rate / Production:       Normal               Volume:                       Normal   Mood:                                       Normal  Affect:                                      Appropriate   Thought Content:                    Clear  Thought Form:                        Coherent  Logical     Insight:                                     Fair      Plan:   Safety Plan: No current safety concerns identified.  Recommended that patient call 911 or go to the local ED should there be a change in any of these risk factors.   Barriers to treatment:  Patient denies substance use in group but has been identified with psychiatrist.   Patient Contracts (see media tab):  None  Substance Use: Not addressed in session   Continue or Discharge: Patient will continue in Adult Partial Hospitalization Program (PHP)  as planned. Patient is likely to benefit from learning and using skills as they work toward the goals identified in their treatment plan.     Patient reported feeling empathy despite having a panic attack previously.   Patient discussed working toward not overanalyzing everything and develop a better nighttime routine.    Skills they plan to practice to address the goal include being aware of what is real and what is not.   They identified a potential barrier as believing I am in danger when I am not.    Patient reported no safety concerns or SIB  Patient reported no substance use  Patient reported taking medications as prescribed  Patient reported feeling proud/grateful for the group.

## 2024-10-31 NOTE — GROUP NOTE
Psychotherapy Group Note    PATIENT'S NAME: Ambar Borges  MRN:   [id number]  :   1975  ACCT. NUMBER: [id number]  DATE OF SERVICE: 10/31/24  START TIME:  1:00 PM  END TIME:  1:50 PM  FACILITATOR: Jaren Atwood TH  TOPIC:  EBP Group: Self-Awareness  [facility] Adult Partial Hospitalization Program  TRACK: [facility]/IOP 2 Combined    NUMBER OF PARTICIPANTS: 5    Summary of Group / Topics Discussed:  Self-Awareness:   Willingness/Willfulness   Adapted from Distress Tolerance Handout 5 & 13     WILLINGNESS: Doing what works, focusing on effectiveness.   Focusing on the skills and behaviors that work.   Open mindedness.  Hearing other perspectives out.   Trying; Trying again.   Being an active participant.   Listening to our wise mind.   Ways I have been a willing participant in my mental health journey:   WILLFULNESS: Resistance to doing what works or trying something new.   Assuming  that won t work  to new skills.   Rejecting, avoiding, fighting reality.   Stuck in our position, situation or emotions (rigid/inflexible).   What causes us to act willful sometimes?       Patient Participation / Response:  Fully participated with the group by sharing personal reflections / insights and openly received / provided feedback with other participants.    Demonstrated understanding of topics discussed through group discussion and participation, Demonstrated understanding of values, strengths, and challenges to learn about themselves, Identified / Expressed readiness to act intentionally, increase self-compassion, promote personal growth, and Verbalized understanding of ways to proactively manage illness    Treatment Plan:  Patient has a current master individualized treatment plan.  See Epic treatment plan for more information.    TASHA Syed

## 2024-10-31 NOTE — GROUP NOTE
OT Clinic Group Note    PATIENT'S NAME: Ambar Borges  MRN:   [id number]  :   1975  ACCT. NUMBER: [id number]  DATE OF SERVICE: 10/31/24  START TIME:  8:30 AM  END TIME:  9:20 AM  FACILITATOR: Osiris Morrison OT  TOPIC: [facility] OT Group: Occupational Therapy Clinic  [facility] Adult Partial Hospitalization Program  TRACK: [facility]     NUMBER OF PARTICIPANTS: 3    Summary of Group / Topics Discussed:  Occupational Therapy Clinic: Provided opportunity for patients to independently choose and engage in a therapeutic activity that supports progress towards their goals and psychiatric recovery. The Occupational Therapy Clinic provides a context for patients to monitor their symptoms, gain self-awareness, practice skills (self-regulation, mindfulness, self-talk, focus/concentration, social, productivity), build a sense of self efficacy and mastery, as well as receive validation, support, and resources. OT checks in, observes, assesses, and monitors performance skills and patterns in context with each group member. Patient completed the Occupational Self Assessment (SHARAD) in which identified functional areas their mental health status is impacting and which are most important for them to work on while in the Partial Hospitalization Program. Patient was provided orientation to the OT clinic and overview of purpose of the OT clinic in support of meeting their goals.     Patient Session Goals / Objectives:  Independently identify a purposeful and meaningful therapeutic activity.  Identified which goal(s) they are intentionally working on during session.   Reflected on their performance and share insight about their progress.  Practiced and identified a way to generalize a skill to their everyday life      Patient Participation / Response:  Fully participated with the group by sharing personal reflections / insights and openly received / provided feedback with other participants.    Patient demonstrated understanding  "of context through participation of a personal productive, leisure, or self care activity (created a card for spouse). Pt reported that it \"feels good  to do something good for someone else\" after group. Patient's affect was appropriate to situation and mood congruent.       Treatment Plan:  Patient has a current master individualized treatment plan.  See Epic treatment plan for more information.    Osiris Morrison, OT  "

## 2024-10-31 NOTE — GROUP NOTE
Psychoeducation Group Note    PATIENT'S NAME: Ambar Borges  MRN:   [id number]  :   1975  ACCT. NUMBER: [id number]  DATE OF SERVICE: 10/31/24  START TIME:  9:30 AM  END TIME: 10:20 AM  FACILITATOR: Bentley Alcala RN  TOPIC:  Wellness Group: [facility] Adult Partial Hospitalization Program  TRACK: [facility]    NUMBER OF PARTICIPANTS: 3    Summary of Group / Topics Discussed:  [facility]:  [facility]: Relationship with Diagnosis    Patient Session Goals / Objectives:  Patients shared and identified their relationship with their diagnosis  Patients shared and identified what and how they feel about their diagnosis  Patient learns how cope in a healthy manner by sharing, identifying, making a conscious effort to progress with peers.          Patient Participation / Response:  Fully participated with the group by sharing personal reflections / insights and openly received / provided feedback with other participants.    Demonstrated understanding of topics discussed through group discussion and participation, Identified / Expressed personal readiness to practice skills, and Verbalized understanding of this topic    Treatment Plan:  Patient has a current master individualized treatment plan.  See Epic treatment plan for more information.    Bentley Alcala, RN

## 2024-11-01 ENCOUNTER — HOSPITAL ENCOUNTER (OUTPATIENT)
Dept: BEHAVIORAL HEALTH | Facility: CLINIC | Age: 49
Discharge: HOME | End: 2024-11-01
Attending: PSYCHIATRY & NEUROLOGY
Payer: COMMERCIAL

## 2024-11-01 PROCEDURE — 90853 GROUP PSYCHOTHERAPY: CPT | Performed by: COUNSELOR

## 2024-11-01 PROCEDURE — H0035 MH PARTIAL HOSP TX UNDER 24H: HCPCS | Performed by: COUNSELOR

## 2024-11-01 PROCEDURE — H0035 MH PARTIAL HOSP TX UNDER 24H: HCPCS

## 2024-11-01 NOTE — ADDENDUM NOTE
Encounter addended by: Bentley Alcala RN on: 11/1/2024 3:10 PM   Actions taken: Clinical Note Signed

## 2024-11-01 NOTE — GROUP NOTE
Process Group Note    PATIENT'S NAME: Ambar Borges  MRN:   [id number]  :   1975  ACCT. NUMBER: [id number]  DATE OF SERVICE: 24  START TIME: 11:00 AM  END TIME: 11:50 AM  FACILITATOR: Dez Arias TH; Jaren Atwood TH  TOPIC:  Process Group    Diagnoses:  KRYSTIN severe  Panic disorder per history  MDD, recurrent, moderate with no psychosis  Alcohol use disorder, moderate  Opioid use disorder, severe in remission with suboxone    [facility] Partial Hospitalization Program  TRACK: [facility]    NUMBER OF PARTICIPANTS: 3        Data:    Session content: At the start of this group, patients were invited to check in by identifying themselves, describing their current emotional status, and identifying issues to address in this group.   Area(s) of treatment focus addressed in this session included Symptom Management.  Patient reported feeling anxious  Patient discussed working toward combating negative self-talk.   Skills they plan to practice to address the goal include replacing negative self-talk.   They identified a potential barrier as having too many thoughts.   Patient reported no safety concerns or SIB  Patient reported no substance use  Patient reported taking medications as prescribed  Patient reported feeling proud/grateful for the group reminding her she is not alone.       Therapeutic Interventions/Treatment Strategies:  Treatment modalities used include Motivational Interviewing.    Assessment:    Patient response:   Patient responded to session by accepting feedback, giving feedback, listening, focusing on goals, being attentive, accepting support, and appearing alert    Possible barriers to participation / learning include: and no barriers identified    Health Issues:   None reported       Substance Use Review:   Substance Use: Last use: 10/31/24    Mental Status/Behavioral Observations  Appearance:   Appropriate   Eye Contact:   Good   Psychomotor Behavior: Normal    Attitude:   Cooperative   Orientation:   All  Speech   Rate / Production: Normal    Volume:  Normal   Mood:    Normal  Affect:    Appropriate   Thought Content:   Clear  Thought Form:  Coherent  Logical     Insight:    Good     Plan:   Safety Plan: No current safety concerns identified.  Recommended that patient call 911 or go to the local ED should there be a change in any of these risk factors.   Barriers to treatment: None identified  Patient Contracts (see media tab):  None  Substance Use: Not addressed in session   Continue or Discharge: Patient will continue in Adult Partial Hospitalization Program (PHP)  as planned. Patient is likely to benefit from learning and using skills as they work toward the goals identified in their treatment plan.      Dez Arias, TH  November 1, 2024

## 2024-11-01 NOTE — GROUP NOTE
Psychotherapy Group Note    PATIENT'S NAME: Ambar Borges  MRN:   [id number]  :   1975  ACCT. NUMBER: [id number]  DATE OF SERVICE: 24  START TIME:  9:30 AM  END TIME: 10:30 AM  FACILITATOR: Jaren Atwood TH; Bentley Alcala RN; Dez Arias  TOPIC: MH EBP Group: Specialty Awareness  [facility] Adult Partial Hospitalization Program  TRACK:  [place] PHP     NUMBER OF PARTICIPANTS:  2    Summary of Group / Topics Discussed:  Specialty Topics: Goal Setting: Provided education and assessment on patient's group programming goals. Evaluated patient's assessment of their ability to participate in groups, share emotions with group members, and openness to the group format. Provided education regarding appropriate individual-based group therapy goals.     Patient Session Goals and Objectives:  -Participate in reflective assessment of group readiness.  -Understand appropriate topics and methods to discuss those topics in group programming.  -Identify and problem solve barriers to group participation.  -Identify strategies to actively cope while engaging in group programming.   -Reflected up individualized treatment plans  -Meet with members of the treatment team to assess goal progress       Patient Participation / Response:  Fully participated with the group by sharing personal reflections / insights and openly received / provided feedback with other participants.    Demonstrated understanding of topics discussed through group discussion and participation, Identified / Expressed readiness to act on skill suggestions discussed in topic, and Verbalized understanding of ways to proactively manage illness    Treatment Plan:  Patient has a current master individualized treatment plan.  See Epic treatment plan for more information.    TASHA Syed

## 2024-11-01 NOTE — GROUP NOTE
Psychotherapy Group Note    PATIENT'S NAME: Ambar Borges  MRN:   [id number]  :   1975  ACCT. NUMBER: [id number]  DATE OF SERVICE: 24  START TIME: 12:00 PM  END TIME: 12:50 PM  FACILITATOR: Jaren Atwood TH  TOPIC:  EBP Group: Self-Awareness  [facility] Adult Partial Hospitalization Program  TRACK: [facility]/IOP 2 Combined    NUMBER OF PARTICIPANTS: 5    Summary of Group / Topics Discussed:  Self-Awareness: Self-Compassion: Patients received overview of key concepts in developing self-compassion. Patients discussed mindfulness, self-kindness, and finding common humanity. Patients identified their current approach to problems in their lives and learned skills for increasing self-compassion. Patients identified ways they can put self-compassion skills into practice and problem solve barriers to application of skills.     Patient Session Goals / Objectives:  [place] components of self-compassion  Identify ways to practice self-compassion in daily life  Problem solve barriers to self-compassion practice      Patient Participation / Response:  Fully participated with the group by sharing personal reflections / insights and openly received / provided feedback with other participants.    Demonstrated understanding of topics discussed through group discussion and participation, Demonstrated understanding of values, strengths, and challenges to learn about themselves, Identified / Expressed readiness to act intentionally, increase self-compassion, promote personal growth, and Verbalized understanding of ways to proactively manage illness    Treatment Plan:  Patient has a current master individualized treatment plan.  See Epic treatment plan for more information.    TASHA Syed

## 2024-11-01 NOTE — GROUP NOTE
"Psychotherapy Group Note    PATIENT'S NAME: Ambar Borges  MRN:   [id number]  :   1975  ACCT. NUMBER: [id number]  DATE OF SERVICE: 24  START TIME:  1:00 PM  END TIME:  1:50 PM  FACILITATOR: Dez Arias TH; Jaren Atwood TH  TOPIC:  EBP Group: Self-Awareness  [facility] Partial Hospitalization Program  TRACK: [place] PHP    NUMBER OF PARTICIPANTS: 2    Summary of Group / Topics Discussed:  Self-Awareness: Gratitude: Topic focused on assisting patients in identifying key concepts in gratitude. Patients discussed the benefits of practicing gratitude and its impact on mood improvement, mindfulness, and perspective. Patients worked to increase time spent on recognition and appreciation of what is positive and working in their lives. Patients discussed the concepts and benefits of feeling grateful. The goal is to reduce rumination and negative thinking resulting in increased mindfulness and resilience. Patients specifically discussed how they can practice and problem solve barriers to daily gratitude practice.     Patients engaged in artistic activity creating \"gratitude tree\" to express things they are currently grateful for.     Patient Session Goals / Objectives:  [place] the concept and benefits of gratitude  Identified ways to practice gratitude in daily life  Problem solved barriers to practicing gratitude      Patient Participation / Response:  Fully participated with the group by sharing personal reflections / insights and openly received / provided feedback with other participants.    Demonstrated understanding of topics discussed through group discussion and participation, Demonstrated understanding of values, strengths, and challenges to learn about themselves, Identified / Expressed readiness to act intentionally, increase self-compassion, promote personal growth, Verbalized understanding of ways to proactively manage illness, and Practiced skills in session    Treatment " Plan:  Patient has a current master individualized treatment plan.  See Epic treatment plan for more information.    Dez Arias TH

## 2024-11-04 ENCOUNTER — HOSPITAL ENCOUNTER (OUTPATIENT)
Dept: BEHAVIORAL HEALTH | Facility: CLINIC | Age: 49
Discharge: HOME | End: 2024-11-04
Attending: PSYCHIATRY & NEUROLOGY
Payer: COMMERCIAL

## 2024-11-04 ENCOUNTER — TELEPHONE (OUTPATIENT)
Dept: BEHAVIORAL HEALTH | Facility: CLINIC | Age: 49
End: 2024-11-04
Payer: COMMERCIAL

## 2024-11-04 PROCEDURE — H0035 MH PARTIAL HOSP TX UNDER 24H: HCPCS | Performed by: COUNSELOR

## 2024-11-04 PROCEDURE — G2211 COMPLEX E/M VISIT ADD ON: HCPCS | Performed by: PSYCHIATRY & NEUROLOGY

## 2024-11-04 PROCEDURE — H0035 MH PARTIAL HOSP TX UNDER 24H: HCPCS

## 2024-11-04 PROCEDURE — 99214 OFFICE O/P EST MOD 30 MIN: CPT | Performed by: PSYCHIATRY & NEUROLOGY

## 2024-11-04 NOTE — PROGRESS NOTES
"[facility]   Adult Mental Health Outpatient Programs  Psychiatric Progress Record    Program Track: [facility]    PATIENT'S NAME: Ambar Borges  MRN:   [id number]  :   1975  ACCT. NUMBER: [id number]  DATE OF SERVICE: 24    Interval History:  \"Things have been going well, my  is worried I might be hypomanic.\" Ambar presents today for follow-up and ongoing program supervision.   Endorses:  The last few days have gone fairly well.  \"Voices\" in her head are no longer from others but her own thoughts.  She understands that they have always been her own thoughts but when it was worse, she attributed them to other family members talking to her through telekinesis.  That has not happened in 4 days.    She increased effexor xr to 75 mg.  Denies any side effects or issues with that.  She wonders if it is helping  She wonders if she has ADHD as her focus has always been an issue  She last had a drink on Sat night.  Had two drinks on .  She feels she does not need the drinks like she used to  She has not taken more than two tabs of klonopin for around 2 weeks  Her  controls the meds, so she is not sure exactly how long she has only taken 2 tabs of klonopin (1 mg total in a day)    Review of Symptoms  Sleep: good  Appetite: ok  Suicidal ideation: denies current or recent suicidal ideation or behavior  Thoughts of non-suicidal self-injury: denied  Recent self-injurious behavior: denied  Homicidal ideation: denied  Other safety concerns: denied    Activities of Daily Living and Related Systems Impacted by Illness:  Hygiene: good, had lipstick on today  Socialization: still hard, still feels lonely  Activities of Daily Living: (cleaning, shopping, bills, etc.): getting better, but still difficult, she wonders if this has something to do with ADHD  Concerns related to work: n/a    Substance use:  Still 1-2 drinks most nights, she is trying to cut that down.  This is significantly better " than previous    Response to Program Interventions:  More insight into anxiety, working on applying skills when at home, likes the groups and looks forward to them    Medications:  Current Outpatient Medications   Medication Sig Dispense Refill    B Complex Vitamins (VITAMIN  B COMPLEX) tablet Take 1 tablet by mouth daily.      buprenorphine HCl-naloxone HCl (SUBOXONE) 8-2 MG per film Place 1 Film under the tongue 2 times daily as needed (symptoms).      clonazePAM (KLONOPIN) 0.5 MG tablet Take 0.5 mg by mouth 3 times daily as needed for anxiety.      ibuprofen (ADVIL) 200 MG capsule Take 600 mg by mouth 2 times daily as needed for mild pain.      levothyroxine (SYNTHROID, LEVOTHROID) 150 MCG tablet Take 1 tablet by mouth daily. 90 tablet 3    OLANZapine (ZYPREXA) 5 MG tablet Take 1 tablet (5 mg) by mouth 2 times daily. 7 tablet 0    OLANZapine (ZYPREXA) 5 MG tablet Take 1 tablet (5 mg) by mouth 2 times daily. 60 tablet 1    progesterone (PROMETRIUM) 100 MG capsule Take 200 mg by mouth at bedtime.      Turmeric (QC TUMERIC COMPLEX PO) Take 1 capsule by mouth daily.      venlafaxine (EFFEXOR XR) 75 MG 24 hr capsule Take 1 capsule (75 mg) by mouth daily. 30 capsule 0    venlafaxine (EFFEXOR XR) 75 MG 24 hr capsule Take 1 capsule (75 mg) by mouth daily. 30 capsule 0       The above list was reviewed and updated in [facility] with patient today.     Patient is taking medications as prescribed and denies adverse effects    Laboratory Results:  Most recent labs reviewed. Pertinent updates/findings: None.     Mental Status Examination:  Vital Signs: [facility] 03/15/2012    Appearance: well groomed, appears stated age, and in no apparent distress.  Attitude: cooperative   Eye Contact: good   Muscle Strength and Tone: normal  Psychomotor Behavior: normal or unremarkable   Gait and Station: normal width, turn, arm swing  Speech: clear, coherent, regular rate, regular rhythm, and fluent  Associations: No loosening of associations  Thought  "Process: coherent and goal directed  Thought Content: no evidence of suicidal ideation or homicidal ideation, no evidence of psychotic thought, no auditory hallucinations present, and no visual hallucinations present  Mood: \"anxious\"  Affect: mood congruent  Insight: good  Judgment: intact, adequate for safety  Impulse Control: intact  Oriented to: time, place, person, and situation  Attention Span and Concentration: Normal  Language: Intact  Recent and Remote Memory: Delayed & immediate recall intact  Fund of Knowledge/Assessment of Intelligence: Average  Capacity of Activities of Daily Living: Independent, able to participate in programmatic care services.    Diagnosis/es:  KRYSTIN severe  Panic disorder per history  MDD, recurrent, moderate with no psychosis  Alcohol use disorder, moderate  Opioid use disorder, severe in remission with suboxone    Assessment/Plan:  Ambar presents today for follow-up psychiatric evaluation and assessment of progress. Endorses still having a lot of anxiety, but more under control.  Mood is doing better.  Still having some drinks but has been able to decrease even more.  Also has decreased klonopin to be consistently 2 tabs a day.      Anxiety  Overall improved   Continue effexor xr 75 mg daily.  No current side effects.  Pt does not appear hypomanic nor manic, but she does admit she feels good.  Will need to continue to monitor this to make sure mood does not become expansive and hypomanic. If this does not occur, would expect the need to increase effexor xr to 150 mg in 2-4 weeks or so  Continue zyprexa 5 mg bid  Continue to work on taper off of klonopin.  Addiction med provider will continue to work on this taper  Pt will check on need for refills as does not have a psychiatrist yet.  Will refill meds prior to graduation from program to make sure she does not have a gap of medications.    Referral was put in for psychiatry  Pt considering IOP    Depression  Overall markedly " improved  Same plan as above    Alcohol use  Overall improved  Pt is decreasing use, but still drinking multiple times a week.  This is reduced in amount and frequency.    Pt states she would like to stop all together, but there does not seem to be much urgency in doing so  Education given on reasons to stop alcohol and taper off klonopin.        Safety Assessment:  Ambar reports suicidal ideation and/or non-suicidal self-injury or thoughts thereof as noted above  Ambar is future-oriented and is engaged in treatment planning   I do not feel that Ambar meets criteria for a 72-hour involuntary hold and remains appropriate for an outpatient level of care    Abe Hussein MD on 11/4/2024 at 10:40 AM    Visit Details:  Type of service: In-person  Location (patient and provider): [facility] Adult Mental Health Outpatient Programmatic Care Offices    Level of Medical Decision Making:   - At least 1 chronic problem that is not stable  - Engaged in prescription drug management during visit (discussed any medication benefits, side effects, alternatives, etc.)  Discussion of management or test interpretation with external physician/other qualified healthcare professional/appropriate source - programmatic care multidisciplinary treatment team      **Time documented must exclude any time separately billed (e.g. add on therapy time)**:[id number]}     This document completed in part using Yapp dictation software and therefore may contain inadvertent word or phrase substitutions.

## 2024-11-04 NOTE — ADDENDUM NOTE
Encounter addended by: Megan Lyon [facility] on: 11/4/2024 11:22 AM   Actions taken: Flowsheet accepted

## 2024-11-04 NOTE — GROUP NOTE
Psychotherapy Group Note    PATIENT'S NAME: Ambar Borges  MRN:   [id number]  :   1975  ACCT. NUMBER: [id number]  DATE OF SERVICE: 24  START TIME:  1:00 PM  END TIME:  1:50 PM  FACILITATOR: Bentley Alcala RN; Jaren Atwood TH; Dez Arias  TOPIC:  EBP Group: Social Support  [facility] Adult Partial Hospitalization Program  TRACK: [place]     NUMBER OF PARTICIPANTS: 1    Summary of Group / Topics Discussed:  Social Support:   Meeting: Patients and their support system participated in a session on creating family and community-based support.  The importance and role of family/social support was stressed to increase the effectiveness of treatment. Patients and their support system were given information on PHP structure, diagnoses served within the program, in addition to key goals and objectives. Additionally, physical symptoms, thoughts, and behaviors commonly observed in psychopathology were presented and discussed.  Lastly, information was presented on ways to help promote and support recovery.  The patients and their support system benefitted from this session by working through an exercise designed to clarify their needs for assistance and support in order to enhance their recovery. Staff helped each clarify their roles, so that the patients can be in charge of their stabilization and recovery.      Patient Session Goals / Objectives:  Normalize aspects of mental health conditions and mental illness  Provide education on  normal  vs.  abnormal  aspects of mental health  Clarify the role of partial hospitalization programming  Reduce stigma associated with mental health diagnosis (es).  Provide social support system with techniques and strategies to improve communication and enhance empathy.  Improve interpersonal communication regarding symptoms  Build a sense of mastery and self-respect      Patient Participation / Response:  Fully participated with the group by  sharing personal reflections / insights and openly received / provided feedback with other participants.    {OP MH PROGRESS NOTE PHP SOCIAL SUPPORT:727188    Treatment Plan:  Patient has a current master individualized treatment plan.  See Epic treatment plan for more information.    Jaren Atwood, TH

## 2024-11-04 NOTE — GROUP NOTE
Psychotherapy Group Note    PATIENT'S NAME: Ambar Borges  MRN:   [id number]  :   1975  ACCT. NUMBER: [id number]  DATE OF SERVICE: 24  START TIME: 12:00 PM  END TIME: 12:50 PM  FACILITATOR: Megan Lyon LPCC  TOPIC: MH EBP Group: Emotions Management  [facility] Adult Partial Hospitalization Program  TRACK: [place] PHP - Billed as individual, since only 1 patient present    NUMBER OF PARTICIPANTS: 1    Summary of Group / Topics Discussed:  Emotions Management: Model of Emotions: Patients were introduced to the cyclical model of emotions.  Explored emotions are shaped by different events and one s interpretation of events.  Group discussed how emotions begin with an event, followed by one s interpretation, followed by associated feelings.  Discussion included a review of personal urges and actions that can/do follow an emotional experience in the patient s life, and the end results.    Patient Session Goals / Objectives:  Demonstrate understanding of types various emotions.  Identify and discuss specific emotions and when they occur; understand triggers.  Identify individual emotions and physical sensations that accompany them.  Discuss urges and actions, and how to influence the intensity of emotional reactions and disrupt the cycle.    Discuss barriers to emotional regulation.  Choose 1-2 strategies to assist with emotional response to potentially distressing situations.      Patient Participation / Response:  Fully participated with the group by sharing personal reflections / insights and openly received / provided feedback with other participants.    Demonstrated understanding of topics discussed through group discussion and participation, Expressed understanding of the relevance / importance of emotions management skills at distressing times in life, Self-aware of experiences with difficult emotions, and strategies to employ to manage them, Demonstrated knowledge of when to consider  applying a variety of emotions management skills in daily life, Demonstrated understanding and practice strategies to manage difficult emotions and move towards healing, Identified barriers to applying emotions management strategies, Identified strategies to overcome barriers to use of emotions management skills, Practiced 2-3 new skills in session, and Identified / Expressed personal readiness to practice new emotions management skills    Treatment Plan:  Patient has a current master individualized treatment plan.  See Epic treatment plan for more information.    Megan Lyon LPCC

## 2024-11-04 NOTE — GROUP NOTE
Psychoeducation Group Note    PATIENT'S NAME: Ambar Borges  MRN:   [id number]  :   1975  ACCT. NUMBER: [id number]  DATE OF SERVICE: 24  START TIME:  8:30 AM  END TIME:  9:30 AM  FACILITATOR: Bentley Alcala RN; Jaren Atwood TH  TOPIC:  Wellness Group: Mind/Body Practice & Complementary  [facility] Adult Partial Hospitalization Program  TRACK: [facility]    NUMBER OF PARTICIPANTS: 1    Summary of Group / Topics Discussed:  Mind/Body Practice & Complementary Therapies:  Relaxation Techniques: In this group, patients were educated on a variety of relaxation and mindfulness skills to reduce distress and improve coping skills. The skills were taught to the group and then practiced through an organized exercise.     Skills taught & practiced included:  Personal Relaxation Scene, Deep Breathing Exercise, Sensory Exercise, Mindful Eating,  Mindful Walking Exercise, Guided Imagery, TIPP skill.     Patient Session Goals / Objectives:  Identified relaxation skills  Explained how the various relaxation skills are practiced  Practiced relaxation experiential       Patient Participation / Response:  Fully participated with the group by sharing personal reflections / insights and openly received / provided feedback with other participants.    Demonstrated understanding of topics discussed through group discussion and participation, Identified / Expressed personal readiness to practice skills, and Verbalized understanding of Mind/Body Practice & Complementary Therapies topic    Treatment Plan:  Patient has a current master individualized treatment plan.  See Epic treatment plan for more information.    TASHA Syed

## 2024-11-04 NOTE — GROUP NOTE
Process Group Note    PATIENT'S NAME: Ambar Borges  MRN:   [id number]  :   1975  ACCT. NUMBER: [id number]  DATE OF SERVICE: 24  START TIME: 11:00 AM  END TIME: 11:50 AM  FACILITATOR: Dez Arias TH  TOPIC:  Process Group    Diagnoses:  KRYSTIN severe  Panic disorder per history  MDD, recurrent, moderate with no psychosis  Alcohol use disorder, moderate  Opioid use disorder, severe in remission with suboxone    [facility] Adult Partial Hospitalization Program  TRACK: [facility]    NUMBER OF PARTICIPANTS: 1        Data:    Session content: At the start of this group, patients were invited to check in by identifying themselves, describing their current emotional status, and identifying issues to address in this group.   Area(s) of treatment focus addressed in this session included Symptom Management    Patient reported feeling great, relaxed, content, happy, and secure.    Patient discussed working toward attention.   Skills they plan to practice to address the goal include focusing better.   They identified a potential barrier as difficulty with remaining focused.   Patient reported no safety concerns or SIB  Patient reported having a beer on Saturday.   Patient reported taking medications as prescribed  Patient reported feeling proud/grateful for her  for allowing her to use his car.      Therapeutic Interventions/Treatment Strategies:  Treatment modalities used include Motivational Interviewing.    Assessment:    Patient response:   Patient responded to session by accepting feedback, listening, focusing on goals, being attentive, and appearing alert    Possible barriers to participation / learning include: and no barriers identified    Health Issues:   None reported       Substance Use Review:   Substance Use: Last use: Saturday     Mental Status/Behavioral Observations  Appearance:   Appropriate   Eye Contact:   Good   Psychomotor Behavior: Normal   Attitude:   Cooperative    Orientation:   All  Speech   Rate / Production: Normal    Volume:  Normal   Mood:    Normal  Affect:    Appropriate   Thought Content:   Clear  Thought Form:  Coherent  Logical     Insight:    Good     Plan:   Safety Plan: No current safety concerns identified.  Recommended that patient call 911 or go to the local ED should there be a change in any of these risk factors.   Barriers to treatment: None identified  Patient Contracts (see media tab):  None  Substance Use: Not addressed in session   Continue or Discharge: Patient will continue in Adult Partial Hospitalization Program (PHP)  as planned. Patient is likely to benefit from learning and using skills as they work toward the goals identified in their treatment plan.      Dez Arias, TH  November 4, 2024

## 2024-11-04 NOTE — GROUP NOTE
Psychoeducation Group Note    PATIENT'S NAME: Ambar Borges  MRN:   [id number]  :   1975  ACCT. NUMBER: [id number]  DATE OF SERVICE: 24  START TIME:  9:30 AM  END TIME: 10:20 AM  FACILITATOR: Osiris Morrison OT; Dez Arias TH  TOPIC: MH Life Skills Group: Sensory Approaches in Mental Health  [facility] Partial Hospitalization Program  TRACK: [facility]    NUMBER OF PARTICIPANTS: 1    Sensory Approaches to Mental Health: Relaxation: Patients were provided with verbal and experiential education to identify physical and sensorimotor based activities that can be utilized for relaxation, stress management, self-care, health maintenance, and self-regulation.  Patients went through an experiential practice of different relaxation skills and identified skills they can use in their daily life to decrease anxiety and promote relaxation. Patients increased knowledge and awareness of activities that support relaxation, build resiliency, and prevent relapse through healthy engagement in mindful focused activities.     Patient Session Goals / Objectives:   Identified specific physical and sensorimotor based activities for relaxation.   Improved awareness of activities that are meaningful focused activities that support relaxation.   Established a plan for practice of these skills in their own environments.   Practiced and reflected on how to generalize taught skills to their everyday life.       Patient Participation / Response:  Fully participated with the group by sharing personal reflections / insights and openly received / provided feedback with other participants.    Verbalized understanding of content    Treatment Plan:  Patient has a current master individualized treatment plan.  See Epic treatment plan for more information.    TASHA Armstrong

## 2024-11-05 ENCOUNTER — HOSPITAL ENCOUNTER (OUTPATIENT)
Dept: BEHAVIORAL HEALTH | Facility: CLINIC | Age: 49
Discharge: HOME | End: 2024-11-05
Attending: PSYCHIATRY & NEUROLOGY | Admitting: PSYCHIATRY & NEUROLOGY
Payer: COMMERCIAL

## 2024-11-05 PROBLEM — F33.1 MAJOR DEPRESSIVE DISORDER, RECURRENT EPISODE, MODERATE WITH MIXED FEATURES (H): Status: ACTIVE | Noted: 2024-11-05

## 2024-11-05 PROCEDURE — H0035 MH PARTIAL HOSP TX UNDER 24H: HCPCS | Performed by: COUNSELOR

## 2024-11-05 PROCEDURE — H0035 MH PARTIAL HOSP TX UNDER 24H: HCPCS

## 2024-11-05 PROCEDURE — 90832 PSYTX W PT 30 MINUTES: CPT | Performed by: COUNSELOR

## 2024-11-05 NOTE — GROUP NOTE
Psychoeducation Group Note    PATIENT'S NAME: Ambar Borges  MRN:   [id number]  :   1975  ACCT. NUMBER: [id number]  DATE OF SERVICE: 24  START TIME:  8:30 AM  END TIME:  9:20 AM  FACILITATOR: Megan Lyon LPCC; Bentley Alcala RN  TOPIC:  Wellness Group: Mind/Body Practice & Complementary  [facility] Adult Partial Hospitalization Program  TRACK: [facility]    NUMBER OF PARTICIPANTS: 2    Summary of Group / Topics Discussed:  Mind/Body Practice & Complementary Therapies:  Progressive Muscle Relaxation: In addition to affecting our mood and behavior, psychological stress can cause a myriad of physical symptoms in our body. Patients were educated on these effects and guided to increased self-awareness of how stress affects their body. The purpose, benefits, history, and techniques of progressive muscle relaxation were discussed. In an instructor guided experiential, patients were guided to practice PMR to help reduce physical symptoms of psychological stress and achieve a more balanced feeling of well-being.    Patient Session Goals / Objectives:  Identified physiological symptoms of stress on the body  Listed & Explained the purpose and benefits to practicing PMR   Practiced progressive muscle relaxation experiential      Patient Participation / Response:  Fully participated with the group by sharing personal reflections / insights and openly received / provided feedback with other participants.    Demonstrated understanding of topics discussed through group discussion and participation, Identified / Expressed personal readiness to practice skills, and Verbalized understanding of Mind/Body Practice & Complementary Therapies topic    Treatment Plan:  Patient has a current master individualized treatment plan.  See Epic treatment plan for more information.    ABNER Shelton

## 2024-11-05 NOTE — GROUP NOTE
OT Clinic Group Note    PATIENT'S NAME: Ambar Borges  MRN:   [id number]  :   1975  ACCT. NUMBER: [id number]  DATE OF SERVICE: 24  START TIME:  9:30 AM  END TIME: 10:20 AM  FACILITATOR: Megan Lyon LPCC; Osiris Story RN  TOPIC: MH PHP OT Group: Occupational Therapy Clinic  [facility] Adult Partial Hospitalization Program  TRACK: [facility]    NUMBER OF PARTICIPANTS: 2    Summary of Group / Topics Discussed:  Occupational Therapy Clinic: Provided opportunity for patients to independently choose and engage in a therapeutic activity that supports progress towards their goals and psychiatric recovery. The Occupational Therapy Clinic provides a context for patients to monitor their symptoms, gain self-awareness, practice skills (self-regulation, mindfulness, self-talk, focus/concentration, social, productivity), build a sense of self efficacy and mastery, as well as receive validation, support, and resources. OT checks in, observes, assesses, and monitors performance skills and patterns in context with each group member. Patient completed the Occupational Self Assessment (SHARAD) in which identified functional areas their mental health status is impacting and which are most important for them to work on while in the Partial Hospitalization Program. Patient was provided orientation to the OT clinic and overview of purpose of the OT clinic in support of meeting their goals.     Patient Session Goals / Objectives:  Independently identify a purposeful and meaningful therapeutic activity.  Identified which goal(s) they are intentionally working on during session.   Reflected on their performance and share insight about their progress.  Practiced and identified a way to generalize a skill to their everyday life      Patient Participation / Response:  {OP  PROGRESS NOTE PATIENT PARTICIPATION:282582}    {OP  PROGRESS NOTE PATIENT RESPONSE - LIFE SKILLS:338476}    Treatment Plan:  Patient has  {OP  PROGRAMMATIC TX PLAN STATUS:137232}    Megan Lyon, [facility]

## 2024-11-05 NOTE — GROUP NOTE
Psychotherapy Group Note    PATIENT'S NAME: Ambar Borges  MRN:   [id number]  :   1975  ACCT. NUMBER: [id number]  DATE OF SERVICE: 24  START TIME:  1:00 PM  END TIME:  1:50 PM  FACILITATOR: Dez Arias TH  TOPIC:  EBP Group: Coping Skills  [facility] Adult Partial Hospitalization Program  TRACK: [place] [facility]    NUMBER OF PARTICIPANTS: 2    Summary of Group / Topics Discussed:  Coping Skills: Building Positive Experiences: Patients discussed the importance of planning and engaging in positive experiences, as strategies to increase positive thinking, hope, and self-worth.  Explored the benefits of planning / creating positive experiences, including recognizing and reducing negativity bias by focusing on and building positive experiences.   Several approaches to building positive experiences were presented and discussed relevant to each patient.      Patient Session Goals / Objectives:  Understand the purpose of planning / creating / participating / sharing in positive experiences.  Explore patient s experiences related to negative thinking and how it influences activities and moodIdentify current positive events in patient s life.   Set goals to increase a variety of positive experiences.  Address barriers to planning / engaging in positive experiences.      Patient Participation / Response:  Fully participated with the group by sharing personal reflections / insights and openly received / provided feedback with other participants.    Demonstrated understanding of topics discussed through group discussion and participation, Expressed understanding of the relevance / importance of coping skills at distressing times in life, and Demonstrated knowledge of when to consider using a variety of coping skills in daily life    Treatment Plan:  Patient has a current master individualized treatment plan.  See Epic treatment plan for more information.    TASHA Armstrong

## 2024-11-05 NOTE — GROUP NOTE
OT Clinic Group Note    PATIENT'S NAME: Ambar Borges  MRN:   [id number]  :   1975  ACCT. NUMBER: [id number]  DATE OF SERVICE: 24  START TIME: 9:55 AM  END TIME: 10:20 AM  FACILITATOR: Osiris Morrison OT; Megan Lyon LPCC  TOPIC:  PHP OT Group: Occupational Therapy Clinic  [facility] Adult Partial Hospitalization Program  TRACK: [facility]    NUMBER OF PARTICIPANTS: 2    Summary of Group / Topics Discussed:  Occupational Therapy Clinic: Provided opportunity for patients to independently choose and engage in a therapeutic activity that supports progress towards their goals and psychiatric recovery. The Occupational Therapy Clinic provides a context for patients to monitor their symptoms, gain self-awareness, practice skills (self-regulation, mindfulness, self-talk, focus/concentration, social, productivity), build a sense of self efficacy and mastery, as well as receive validation, support, and resources. OT checks in, observes, assesses, and monitors performance skills and patterns in context with each group member. Patient completed the Occupational Self Assessment (SHARAD) in which identified functional areas their mental health status is impacting and which are most important for them to work on while in the Partial Hospitalization Program. Patient was provided orientation to the OT clinic and overview of purpose of the OT clinic in support of meeting their goals.     Patient Session Goals / Objectives:  Independently identify a purposeful and meaningful therapeutic activity.  Identified which goal(s) they are intentionally working on during session.   Reflected on their performance and share insight about their progress.  Practiced and identified a way to generalize a skill to their everyday life      Patient Participation / Response:  Fully participated with the group by sharing personal reflections / insights and openly received / provided feedback with other participants.    Patient  demonstrated understanding of context through participation of a personal productive, leisure, or self care activity (leisure/hobby activity). Pt reported feeling that it was difficult to distract from racing thoughts after group. Patient's affect was appropriate to situation and mood congruent.       Treatment Plan:  Patient has a current master individualized treatment plan.  See Epic treatment plan for more information.    Megan Lyon, [facility]

## 2024-11-05 NOTE — GROUP NOTE
Process Group Note    PATIENT'S NAME: Ambar Borges  MRN:   [id number]  :   1975  ACCT. NUMBER: [id number]  DATE OF SERVICE: 24  START TIME: 11:00 AM  END TIME: 11:50 AM  FACILITATOR: Dez Arias TH  TOPIC:  Process Group    Diagnoses:  KRYSTIN severe  Panic disorder per history  MDD, recurrent, moderate with no psychosis  Alcohol use disorder, moderate  Opioid use disorder, severe in remission with suboxone    [facility] Adult Partial Hospitalization Program  TRACK: [facility]    NUMBER OF PARTICIPANTS: 2        Data:    Session content: At the start of this group, patients were invited to check in by identifying themselves, describing their current emotional status, and identifying issues to address in this group.   Area(s) of treatment focus addressed in this session included Symptom Management.  Patient reported feeling anxiety and calm  Patient discussed working toward convincing self that thoughts are not actually people.   Skills they plan to practice to address the goal include remind myself I am in my brain.   They identified a potential barrier as thoughts  Patient reported no safety concerns or SIB  Patient reported no substance use  Patient reported taking medications as prescribed  Patient reported feeling proud/grateful for today and being able to come to group.      Therapeutic Interventions/Treatment Strategies:  Treatment modalities used include Motivational Interviewing.    Assessment:    Patient response:   Patient responded to session by accepting feedback, giving feedback, listening, focusing on goals, being attentive, accepting support, and appearing alert    Possible barriers to participation / learning include: and no barriers identified    Health Issues:   None reported       Substance Use Review:   Substance Use: No active concerns identified.    Mental Status/Behavioral Observations  Appearance:   Appropriate   Eye Contact:   Good   Psychomotor Behavior: Normal    Attitude:   Cooperative   Orientation:   All  Speech   Rate / Production: Normal    Volume:  Normal   Mood:    Normal  Affect:    Appropriate   Thought Content:   Clear  Thought Form:  Coherent  Logical     Insight:    Good     Plan:   Safety Plan: No current safety concerns identified.  Recommended that patient call 911 or go to the local ED should there be a change in any of these risk factors.   Barriers to treatment: None identified  Patient Contracts (see media tab):  None  Substance Use: Not addressed in session   Continue or Discharge: Patient will continue in Adult Partial Hospitalization Program (PHP)  as planned. Patient is likely to benefit from learning and using skills as they work toward the goals identified in their treatment plan.      Dez Arias, TH  November 5, 2024

## 2024-11-06 ENCOUNTER — HOSPITAL ENCOUNTER (OUTPATIENT)
Dept: BEHAVIORAL HEALTH | Facility: CLINIC | Age: 49
Discharge: HOME | End: 2024-11-06
Attending: PSYCHIATRY & NEUROLOGY
Payer: COMMERCIAL

## 2024-11-06 PROCEDURE — 90832 PSYTX W PT 30 MINUTES: CPT | Performed by: COUNSELOR

## 2024-11-06 PROCEDURE — H0035 MH PARTIAL HOSP TX UNDER 24H: HCPCS | Performed by: COUNSELOR

## 2024-11-06 PROCEDURE — H0035 MH PARTIAL HOSP TX UNDER 24H: HCPCS

## 2024-11-06 NOTE — GROUP NOTE
Process Group Note    PATIENT'S NAME: Ambar Borges  MRN:   [id number]  :   1975  ACCT. NUMBER: [id number]  DATE OF SERVICE: 24  START TIME: 11:00 AM  END TIME: 11:50 AM  FACILITATOR: Megan Lyon LPCC  TOPIC:  Process Group    Diagnoses:  KRYTSIN severe  Panic disorder per history  MDD, recurrent, moderate with no psychosis  Alcohol use disorder, moderate  Opioid use disorder, severe in remission with suboxone    [facility] Adult Partial Hospitalization Program  TRACK: [place] [facility]    NUMBER OF PARTICIPANTS: 4        Data:    Session content: At the start of this group, patients were invited to check in by identifying themselves, describing their current emotional status, and identifying issues to address in this group.   Area(s) of treatment focus addressed in this session included Symptom Management and Personal Safety.    Patient reported feeling 'great, tired.'   Patient discussed working toward trying to figure out how to make the voices her friends or to get rid of them completely.   Skills they plan to practice to address the goal include meditation, distraction.  They identified a potential barrier as stubbornness, voices.   Patient reported no safety concerns or SIB.  Patient reported no substance use.  Patient reported taking medications as prescribed.  Patient reported feeling proud/grateful for making it to the second to last group.       Therapeutic Interventions/Treatment Strategies:  Psychotherapist offered support, feedback and validation, set limits, provided redirection, and reinforced use of skills. Treatment modalities used include Dialectical Behavioral Therapy. Interventions include Cognitive Restructuring:  Assisted patient in formulating new neutral/positive alternatives to challenge less helpful / ineffective thoughts and Emotions Management:  Reinforced the purpose and biological basis of emotions, Discussed barriers to emotional regulation, and  Increased awareness of daily mood patterns/changes.    Assessment:    Patient response:   Patient responded to session by accepting feedback, giving feedback, listening, focusing on goals, being attentive, and accepting support    Possible barriers to participation / learning include: and no barriers identified    Health Issues:   None reported       Substance Use Review:   Substance Use: alcohol .     Mental Status/Behavioral Observations  Appearance:   Appropriate   Eye Contact:   Good   Psychomotor Behavior: Restless   Attitude:   Cooperative   Orientation:   All  Speech   Rate / Production: Talkative   Volume:  Normal   Mood:    Euthymic  Affect:    Lethargic   Thought Content:   Rumination. Pt describes hearing voices, but understands them to be her own thoughts.   Thought Form:  Coherent  Logical     Insight:    Good     Plan:   Safety Plan: No current safety concerns identified.  Recommended that patient call 911 or go to the local ED should there be a change in any of these risk factors.   Barriers to treatment: None identified  Patient Contracts (see media tab):  None  Substance Use: Not addressed in session   Continue or Discharge: Patient will continue in Adult Partial Hospitalization Program (PHP)  as planned. Patient is likely to benefit from learning and using skills as they work toward the goals identified in their treatment plan.      ABNER Shelton  November 6, 2024

## 2024-11-06 NOTE — GROUP NOTE
Psychoeducation Group Note    PATIENT'S NAME: Ambar Borges  MRN:   [id number]  :   1975  ACCT. NUMBER: [id number]  DATE OF SERVICE: 24  START TIME: 9:55 AM  END TIME: 10:20 AM  FACILITATOR: Bentley Alcala RN; Megan Lyon LPCC  TOPIC:  Wellness Group: [facility] Adult Partial Hospitalization Program  TRACK: [facility]    NUMBER OF PARTICIPANTS: 2    Summary of Group / Topics Discussed:  Foundations of Health: Sleep: Case study/sleep hygiene: Patients explored the connection between sleep and mental illness. Patients learned about how adequate sleep can improve health, productivity, wellness, quality of life, and safety.     Patient Session Goals / Objectives:  Demonstrated understanding of sleep hygiene practices and benefits of sleep  Identified sleep hygiene strategies to utilize   Described the connection between sleep disturbances and mental illness      Patient Participation / Response:  Fully participated with the group by sharing personal reflections / insights and openly received / provided feedback with other participants.    Demonstrated understanding of topics discussed through group discussion and participation, Identified / Expressed personal readiness to practice skills, and Verbalized understanding of foundations of health topic    Treatment Plan:  Patient has a current master individualized treatment plan.  See Epic treatment plan for more information.    ABNER Shelton

## 2024-11-06 NOTE — GROUP NOTE
"Psychotherapy Group Note    PATIENT'S NAME: Ambar Borges  MRN:   [id number]  :   1975  ACCT. NUMBER: [id number]  DATE OF SERVICE: 24  START TIME: 12:00 PM  END TIME: 12:50 PM  FACILITATOR: Dez Arias TH; Jaren Atwood TH  TOPIC: MH EBP Group: Relationship Skills  [facility] Partial Hospitalization Program  TRACK: [facility]    NUMBER OF PARTICIPANTS: 2    Summary of Group / Topics Discussed:  Relationship Skills: Conflict Resolution: Topic of conflict resolution in interpersonal communication was discussed. Patients received an overview of how communication skills during times of conflict impact symptoms and functioning. Patients discussed their current communication challenges and how this impacts their functioning. Patients also verbalized understanding of skills learned and practiced in session.     The group completed \"What's your conflict management style\" quiz and discussed the results as a group.     Patient Session Goals / Objectives:  Identified and discussed patient individual challenges with communication  Presented and practiced effective communication skills in session  Assisted patients in implementing more effective communication skills in their relationships      Patient Participation / Response:  Fully participated with the group by sharing personal reflections / insights and openly received / provided feedback with other participants.    Demonstrated understanding of topics discussed through group discussion and participation, Demonstrated understanding of relationship skills and communication skills, Identified / Expressed personal readiness to incorporate effective communication skills, and Verbalized understanding of communication skills, communication challenges, and communication strengths    Treatment Plan:  Patient has a current master individualized treatment plan.  See Epic treatment plan for more information.    TASHA Armstrong   "

## 2024-11-06 NOTE — GROUP NOTE
Psychotherapy Group Note    PATIENT'S NAME: Ambar Borges  MRN:   [id number]  :   1975  ACCT. NUMBER: [id number]  DATE OF SERVICE: 24  START TIME:  1:00 PM  END TIME:  1:50 PM  FACILITATOR: Jaren Atwood TH  TOPIC:  EBP Group: Specialty Awareness  [facility] Adult [facility] Hospital[facility]  TRACK: [facility]/IOP 2 Combined    NUMBER OF PARTICIPANTS: 5    Summary of Group / Topics Discussed:  Specialty Topics: Grief/Transitions: Patients received an overview of the grief process.  Patients explored their relationship to loss and how that has affected their mental health symptoms.  Strategies for recognizing loss and ideas for engaging in the grief process was presented and discussed.  Patients identified needs for support and coping skills to manage loss.  The purpose of this specialty topic is to help patients identify the aspects of change due to loss that individuals experience in addition to mental health symptoms to better cope with the grief, loss, and life transitions.      Patient Session Goals / Objectives:  Identified grief, loss, and life transitions   Discussed how grief impacts mental health symptoms and disrupts usual functioning  Identified needs for support and coping with grief and planned further action for coping      Patient Participation / Response:  Fully participated with the group by sharing personal reflections / insights and openly received / provided feedback with other participants.    Demonstrated understanding of topics discussed through group discussion and participation, Identified / Expressed readiness to act on skill suggestions discussed in topic, and Verbalized understanding of ways to proactively manage illness    Treatment Plan:  Patient has a current master individualized treatment plan.  See Epic treatment plan for more information.    TASHA Syed

## 2024-11-06 NOTE — GROUP NOTE
Psychoeducation Group Note    PATIENT'S NAME: Ambar Borges  MRN:   [id number]  :   1975  ACCT. NUMBER: [id number]  DATE OF SERVICE: 24  START TIME:  8:30 AM  END TIME:  9:20 AM  FACILITATOR: Megan Lyon LPCC; Osiris Story RN  TOPIC: MH Life Skills Group: Resiliency Development  [facility] Adult Partial Hospitalization Program  TRACK: [facility]    NUMBER OF PARTICIPANTS: 1    Summary of Group / Topics Discussed:  Resiliency Development:  Coping Skills: Aftercare Coping Cards: Patients were taught how to begin to develop a relapse management kit for both mental and substance use/abuse by creating individualized coping cards.    Patient Session Goals / Objectives:  Identified individualized coping skills they can use for effectively managing both mental health and substance abuse/abuse symptoms   Improved awareness of different types of coping skills and how to personalize them to their unique situation and circumstances    Established a plan for practice of these skills in their own environments  Practiced and reflected on how to generalize taught skills to their everyday life      Patient Participation / Response:  Fully participated with the group by sharing personal reflections / insights and openly received / provided feedback with other participants.    Patient presentation: engaged, attentive, Demonstrated understanding of content through participation in group activity, discussion, making coping cards , and Verbalized understanding of content    Treatment Plan:  Patient has a current master individualized treatment plan.  See Epic treatment plan for more information.    ABNER Shelton

## 2024-11-07 ENCOUNTER — TELEPHONE (OUTPATIENT)
Dept: BEHAVIORAL HEALTH | Facility: CLINIC | Age: 49
End: 2024-11-07
Payer: COMMERCIAL

## 2024-11-07 ENCOUNTER — HOSPITAL ENCOUNTER (OUTPATIENT)
Dept: BEHAVIORAL HEALTH | Facility: CLINIC | Age: 49
Discharge: HOME | End: 2024-11-07
Attending: PSYCHIATRY & NEUROLOGY
Payer: COMMERCIAL

## 2024-11-07 PROBLEM — F41.1 GENERALIZED ANXIETY DISORDER: Status: ACTIVE | Noted: 2024-11-07

## 2024-11-07 PROCEDURE — H0035 MH PARTIAL HOSP TX UNDER 24H: HCPCS | Performed by: COUNSELOR

## 2024-11-07 PROCEDURE — H0035 MH PARTIAL HOSP TX UNDER 24H: HCPCS

## 2024-11-07 NOTE — GROUP NOTE
Psychotherapy Group Note    PATIENT'S NAME: Ambar Borges  MRN:   [id number]  :   1975  ACCT. NUMBER: [id number]  DATE OF SERVICE: 24  START TIME:  9:30 AM  END TIME: 10:30 AM  FACILITATOR: Jaren Atwood TH  TOPIC:  DBT Group: Emotion Regulation  [facility] Adult Partial Hospitalization Program  TRACK: [facility]     NUMBER OF PARTICIPANTS: 3    Summary of Group/Topics Discussed:  Emotion Regulation: The Emotion Regulation skills teach Patients to manage their emotions, even though complete emotional control cannot be achieved. To a certain extent, we all are who we are, and emotionality is part of us; however, Patients can learn to have more control. Patients are provided education around understanding and identifying emotions, the functions emotions serve and how to change emotional responses. Change skills help Patients reduce the intensity and/or duration of painful or unwanted emotions. Patients will also be taught skills that support a reduction in emotional vulnerability and increase resilience. Today Patients discussed SAD and seasonal changes.       Patient Session Goals/Objectives:  Demonstrate understanding of SAD   Identify barriers to increased SAD symptoms.   Engage in discussion around the pros and cons of using different interventions.  Verbalize ways that they can be used in their own lives.      Patient Participation / Response:  Fully participated with the group by sharing personal reflections / insights and openly received / provided feedback with other participants.    Demonstrated understanding of topics discussed through group discussion and participation, Expressed understanding of the relationship between behaviors, thoughts, and feelings, and Shared experiences and challenges with making behavioral changes    Treatment Plan:  Patient has a current master individualized treatment plan.  See Epic treatment plan for more information.    TASHA Syed

## 2024-11-07 NOTE — GROUP NOTE
Psychoeducation Group Note    PATIENT'S NAME: Ambar Borges  MRN:   [id number]  :   1975  ACCT. NUMBER: [id number]  DATE OF SERVICE: 24  START TIME: 12:00 PM  END TIME: 12:50 PM  FACILITATOR: Dez Arias TH  TOPIC:  Life Skills Group: Communication and Social Skills Development  [facility] Partial Hospitalization Program  TRACK: [place] [facility]    NUMBER OF PARTICIPANTS: 2    Summary of Group / Topics Discussed:  Communication and Social Skills Development: Communication Styles: Anatomy of Trust: BRAVING Skill: Patients were taught and gained awareness of interpersonal relationships, specifically trust.  Patients watched a video by Dayana Ashton and were introduced to the acronym BRAVING as a tool to reflect on issues in important relationships in a more manageable way and use skills to express the true issue of the conflict.  Patients also reflected on self trust and self compassion.      Patient Session Goals / Objectives:  Identified skills that help improve interpersonal relationships and express conflict      Improved awareness of important aspects of trust in interpersonal relationships and how this relates to mental health recovery      Established a plan for practice of these skills in their own environments  Practiced and reflected on how to generalize taught skills to their everyday life      Patient Participation / Response:  Fully participated with the group by sharing personal reflections / insights and openly received / provided feedback with other participants.    Verbalized understanding of content    Treatment Plan:  Patient has a current master individualized treatment plan.  See Epic treatment plan for more information.    TASHA Armstrong

## 2024-11-07 NOTE — GROUP NOTE
Psychotherapy Group Note    PATIENT'S NAME: Ambar Borges  MRN:   [id number]  :   1975  ACCT. NUMBER: [id number]  DATE OF SERVICE: 24  START TIME: 12:00 PM  END TIME: 12:50 PM  FACILITATOR: Jaren Atwood TH; Bentley Alcala RN  TOPIC: MH EBP Group: Specialty Awareness  [facility] Adult Partial Hospitalization Program  TRACK: [place] [facility]/I[facility] Combined    NUMBER OF PARTICIPANTS: 5    Summary of Group / Topics Discussed:  Specialty Topics: Goal Setting: Provided education and assessment on patient's group programming goals. Evaluated patient's assessment of their ability to participate in groups, share emotions with group members, and openness to the group format. Provided education regarding appropriate individual-based group therapy goals.     Patient Session Goals and Objectives:  -Participate in reflective assessment of group readiness.  -Understand appropriate topics and methods to discuss those topics in group programming.  -Identify and problem solve barriers to group participation.  -Identify strategies to actively cope while engaging in group programming.   -Reflected up individualized treatment plans  -Meet with members of the treatment team to assess goal progress       Patient Participation / Response:  Fully participated with the group by sharing personal reflections / insights and openly received / provided feedback with other participants.    Demonstrated understanding of topics discussed through group discussion and participation, Identified / Expressed readiness to act on skill suggestions discussed in topic, and Verbalized understanding of ways to proactively manage illness    Treatment Plan:  Patient has a current master individualized treatment plan.  See Epic treatment plan for more information.    TASHA Syed

## 2024-11-07 NOTE — GROUP NOTE
Psychoeducation Group Note    PATIENT'S NAME: Ambar Borges  MRN:   [id number]  :   1975  ACCT. NUMBER: [id number]  DATE OF SERVICE: 24  START TIME:  1:00 PM  END TIME:  1:50 PM  FACILITATOR: Megan Lyon LPCC; Bentley Alcala RN  TOPIC:  Wellness Group: [facility] Adult Partial Hospitalization Program  TRACK: [facility]    NUMBER OF PARTICIPANTS: 4    Summary of Group / Topics Discussed:  Foundations of Health: Nutrition: Macronutrients: Patient were provided education on major macronutrients, their role in the body, and why it is important to meet daily nutritional needs. Obstacles to meeting daily nutritional needs were identified in group discussion and strategies to promote improved nutrition were explored. Macronutrients discussed include: carbohydrates, proteins and amino acids, fats, fiber, and water.     Patient Session Goals / Objectives:  Discussed the role of diet on mood, physical health, energy level, and the development of chronic disease.  Identified daily nutritional needs recommended by the [employer] via [employer] education resources  Developing increased health literacy skills in finding credible nutrition information from reliable sources      Patient Participation / Response:  Fully participated with the group by sharing personal reflections / insights and openly received / provided feedback with other participants.    Demonstrated understanding of topics discussed through group discussion and participation, Identified / Expressed personal readiness to practice skills, and Verbalized understanding of foundations of health topic    Treatment Plan:  Patient has See Epic Treatment Plan - Patient is discharging.    ABNER Shelton

## 2024-11-07 NOTE — TELEPHONE ENCOUNTER
----- Message from Bentley CEBALLOS sent at 11/7/2024  3:39 PM CST -----  Regarding: Patient Discharging [facility] today please remove from DANIELLA  Patient Discharging [facility] today please remove from DANIELLA

## 2024-11-07 NOTE — GROUP NOTE
Process Group Note    PATIENT'S NAME: Ambar Borges  MRN:   [id number]  :   1975  ACCT. NUMBER: [id number]  DATE OF SERVICE: 24  START TIME: 11:00 AM  END TIME: 11:50 AM  FACILITATOR: Jaren Atwood TH  TOPIC:  Process Group    Diagnoses:  Psychosis, unspecified psychosis type (H)     [facility] Adult Partial Hospitalization Program  TRACK:  [place] [facility]/IOP 2    NUMBER OF PARTICIPANTS:  5        Data:    Session content: At the start of this group, patients were invited to check in by identifying themselves, describing their current emotional status, and identifying issues to address in this group.   Area(s) of treatment focus addressed in this session included Symptom Management, Personal Safety, and Community Resources/Discharge Planning.  Patient arrived on time for group and engaged with the prompted check-in questions pertaining to emotions, goals, boundaries, barriers, safety concerns, chemical use, medication management, and group feedback, and stated the following:    Feeling:  apprehensive, pt reported it is her last day of programming.   Treatment Goal:  negative thinking patterns,  Skills:  distraction, healthy habits,   Barriers:   pt reported herself.   Safety concerns:  Pt denied.  Chemicals:  Pt denied.   Meds: Pt reported taking as prescribed.    Grateful: Pt reported being grateful for the group and her .   Support:  Pt reported being open to support.   Feedback: Pt reported being open to feedback.   Processing Time:  Not needed.    Therapeutic Interventions/Treatment Strategies:  Psychotherapist offered support, feedback and validation, set limits, and provided redirection. Treatment modalities used include Motivational Interviewing and Cognitive Behavioral Therapy.    Assessment:    Patient response:   Patient responded to session by accepting feedback, giving feedback, listening, focusing on goals, and being attentive    Possible barriers to participation /  learning include: severity of symptoms    Health Issues:   None reported       Substance Use Review:   Substance Use: No active concerns identified.    Mental Status/Behavioral Observations  Appearance:   Appropriate   Eye Contact:   Good   Psychomotor Behavior: Normal   Attitude:   Cooperative   Orientation:   All  Speech   Rate / Production: Normal    Volume:  Normal   Mood:    Anxious  Normal  Affect:    Appropriate   Thought Content:   Clear  Thought Form:  Coherent  Logical     Insight:    Good     Plan:   Safety Plan: No current safety concerns identified.  Recommended that patient call 911 or go to the local ED should there be a change in any of these risk factors.   Barriers to treatment:  pt reported herself.   Patient Contracts (see media tab):  None  Substance Use: Not addressed in session   Continue or Discharge: Patient is being discharged today. See Treatment Plan and Discharge Summary.       Jaren Atwood, TH November 7, 2024

## 2024-11-07 NOTE — GROUP NOTE
OT Clinic Group Note    PATIENT'S NAME: Ambar Borges  MRN:   [id number]  :   1975  ACCT. NUMBER: [id number]  DATE OF SERVICE: 24  START TIME:  8:30 AM  END TIME:  9:20 AM  FACILITATOR: Osiris Morrison OT; Jaren Atwood TH  TOPIC: [facility] OT Group: Occupational Therapy Clinic  [facility] Adult Partial Hospitalization Program  TRACK: [facility]    NUMBER OF PARTICIPANTS: 3    Summary of Group / Topics Discussed:  Occupational Therapy Clinic: Provided opportunity for patients to independently choose and engage in a therapeutic activity that supports progress towards their goals and psychiatric recovery. The Occupational Therapy Clinic provides a context for patients to monitor their symptoms, gain self-awareness, practice skills (self-regulation, mindfulness, self-talk, focus/concentration, social, productivity), build a sense of self efficacy and mastery, as well as receive validation, support, and resources. OT checks in, observes, assesses, and monitors performance skills and patterns in context with each group member. Patient completed the Occupational Self Assessment (SHARAD) in which identified functional areas their mental health status is impacting and which are most important for them to work on while in the Partial Hospitalization Program. Patient was provided orientation to the OT clinic and overview of purpose of the OT clinic in support of meeting their goals.     Patient Session Goals / Objectives:  Independently identify a purposeful and meaningful therapeutic activity.  Identified which goal(s) they are intentionally working on during session.   Reflected on their performance and share insight about their progress.  Practiced and identified a way to generalize a skill to their everyday life      Patient Participation / Response:  Fully participated with the group by sharing personal reflections / insights and openly received / provided feedback with other participants.    Patient  demonstrated understanding of context through participation of a personal productive, leisure, or self care activity (leisure exploration through scratch art). Pt reported feeling good after group. Patient's affect was appropriate to situation and mood congruent.      Treatment Plan:  Patient has a current master individualized treatment plan.  See Epic treatment plan for more information.    Osiris Morrison, OT

## 2024-11-08 NOTE — ADDENDUM NOTE
Encounter addended by: Jaren Atwood, TH on: 11/8/2024 2:25 PM   Actions taken: Clinical Note Signed, Charge Capture section accepted

## 2024-11-08 NOTE — GROUP NOTE
Psychotherapy Group Note    PATIENT'S NAME: Ambar Borges  MRN:   [id number]  :   1975  ACCT. NUMBER: [id number]  DATE OF SERVICE: 24  START TIME: 12:00 PM  END TIME: 12:50 PM  FACILITATOR: Jaren Atwood TH; Bentley Alcala RN  TOPIC: MH EBP Group: Specialty Awareness  TRACK:  [place] [facility]/IOP 2 Combined    Patient Participation / Response:  Fully participated with the group by sharing personal reflections / insights and openly received / provided feedback with other participants.    Demonstrated understanding of topics discussed through group discussion and participation, Identified / Expressed readiness to act on skill suggestions discussed in topic, and Verbalized understanding of ways to proactively manage illness    Treatment Plan:  Patient has a current master individualized treatment plan.  See Epic treatment plan for more information.    TASHA Syed

## 2025-03-11 ENCOUNTER — TELEPHONE (OUTPATIENT)
Dept: PSYCHOLOGY | Facility: CLINIC | Age: 50
End: 2025-03-11
Payer: COMMERCIAL

## 2025-03-11 NOTE — TELEPHONE ENCOUNTER
Left message about intake and if patient would like to keep follow up for intake next week.  Asked for a a return call and gave phone number asking for confirmation.

## 2025-03-11 NOTE — TELEPHONE ENCOUNTER
Called patient at 1113am via [employer].  She has an intake and is not on the video.  The phone rings a couple of times and then goes to a dial tone.  I will send the invite per e mail as requested again and by phone to see if patient gets on

## 2025-04-24 ENCOUNTER — HOSPITAL ENCOUNTER (OUTPATIENT)
Facility: CLINIC | Age: 50
Setting detail: OBSERVATION
End: 2025-04-24
Attending: EMERGENCY MEDICINE | Admitting: INTERNAL MEDICINE
Payer: COMMERCIAL

## 2025-04-24 ENCOUNTER — APPOINTMENT (OUTPATIENT)
Dept: MRI IMAGING | Facility: CLINIC | Age: 50
End: 2025-04-24
Attending: EMERGENCY MEDICINE
Payer: COMMERCIAL

## 2025-04-24 ENCOUNTER — APPOINTMENT (OUTPATIENT)
Dept: CT IMAGING | Facility: CLINIC | Age: 50
End: 2025-04-24
Payer: COMMERCIAL

## 2025-04-24 DIAGNOSIS — T42.6X1A: ICD-10-CM

## 2025-04-24 DIAGNOSIS — R41.0 CONFUSION: ICD-10-CM

## 2025-04-24 DIAGNOSIS — R79.89 INCREASED AMMONIA LEVEL: ICD-10-CM

## 2025-04-24 LAB
ALBUMIN SERPL BCG-MCNC: 4.4 G/DL (ref 3.5–5.2)
ALP SERPL-CCNC: 109 U/L (ref 40–150)
ALT SERPL W P-5'-P-CCNC: 36 U/L (ref 0–50)
AMMONIA PLAS-SCNC: 193 UMOL/L (ref 11–51)
AMMONIA PLAS-SCNC: 38 UMOL/L (ref 11–51)
AMMONIA PLAS-SCNC: 40 UMOL/L (ref 11–51)
AMMONIA PLAS-SCNC: 80 UMOL/L (ref 11–51)
ANION GAP SERPL CALCULATED.3IONS-SCNC: 16 MMOL/L (ref 7–15)
ANION GAP SERPL CALCULATED.3IONS-SCNC: 21 MMOL/L (ref 7–15)
APTT PPP: 27 SECONDS (ref 22–38)
AST SERPL W P-5'-P-CCNC: 26 U/L (ref 0–45)
ATRIAL RATE - MUSE: 92 BPM
BASOPHILS # BLD AUTO: 0 10E3/UL (ref 0–0.2)
BASOPHILS NFR BLD AUTO: 1 %
BILIRUB DIRECT SERPL-MCNC: 0.17 MG/DL (ref 0–0.3)
BILIRUB SERPL-MCNC: 0.3 MG/DL
BUN SERPL-MCNC: 11.2 MG/DL (ref 6–20)
BUN SERPL-MCNC: 11.8 MG/DL (ref 6–20)
CALCIUM SERPL-MCNC: 8.9 MG/DL (ref 8.8–10.4)
CALCIUM SERPL-MCNC: 9.2 MG/DL (ref 8.8–10.4)
CHLORIDE SERPL-SCNC: 100 MMOL/L (ref 98–107)
CHLORIDE SERPL-SCNC: 102 MMOL/L (ref 98–107)
CREAT SERPL-MCNC: 0.92 MG/DL (ref 0.51–0.95)
CREAT SERPL-MCNC: 1.05 MG/DL (ref 0.51–0.95)
DIASTOLIC BLOOD PRESSURE - MUSE: NORMAL MMHG
EGFRCR SERPLBLD CKD-EPI 2021: 64 ML/MIN/1.73M2
EGFRCR SERPLBLD CKD-EPI 2021: 75 ML/MIN/1.73M2
EOSINOPHIL # BLD AUTO: 0 10E3/UL (ref 0–0.7)
EOSINOPHIL NFR BLD AUTO: 1 %
ERYTHROCYTE [DISTWIDTH] IN BLOOD BY AUTOMATED COUNT: 15 % (ref 10–15)
ETHANOL SERPL-MCNC: <0.01 G/DL
GLUCOSE SERPL-MCNC: 112 MG/DL (ref 70–99)
GLUCOSE SERPL-MCNC: 115 MG/DL (ref 70–99)
HCG SERPL QL: NEGATIVE
HCO3 SERPL-SCNC: 17 MMOL/L (ref 22–29)
HCO3 SERPL-SCNC: 22 MMOL/L (ref 22–29)
HCT VFR BLD AUTO: 37.6 % (ref 35–47)
HGB BLD-MCNC: 12.3 G/DL (ref 11.7–15.7)
IMM GRANULOCYTES # BLD: 0 10E3/UL
IMM GRANULOCYTES NFR BLD: 0 %
INR PPP: 1.09 (ref 0.85–1.15)
INTERPRETATION ECG - MUSE: NORMAL
LYMPHOCYTES # BLD AUTO: 1.7 10E3/UL (ref 0.8–5.3)
LYMPHOCYTES NFR BLD AUTO: 55 %
MCH RBC QN AUTO: 31.5 PG (ref 26.5–33)
MCHC RBC AUTO-ENTMCNC: 32.7 G/DL (ref 31.5–36.5)
MCV RBC AUTO: 96 FL (ref 78–100)
MONOCYTES # BLD AUTO: 0.3 10E3/UL (ref 0–1.3)
MONOCYTES NFR BLD AUTO: 8 %
NEUTROPHILS # BLD AUTO: 1.1 10E3/UL (ref 1.6–8.3)
NEUTROPHILS NFR BLD AUTO: 35 %
NRBC # BLD AUTO: 0 10E3/UL
NRBC BLD AUTO-RTO: 0 /100
P AXIS - MUSE: 27 DEGREES
PLATELET # BLD AUTO: 170 10E3/UL (ref 150–450)
POTASSIUM SERPL-SCNC: 4.3 MMOL/L (ref 3.4–5.3)
POTASSIUM SERPL-SCNC: 4.3 MMOL/L (ref 3.4–5.3)
PR INTERVAL - MUSE: 150 MS
PROT SERPL-MCNC: 7.1 G/DL (ref 6.4–8.3)
QRS DURATION - MUSE: 90 MS
QT - MUSE: 362 MS
QTC - MUSE: 447 MS
R AXIS - MUSE: 19 DEGREES
RBC # BLD AUTO: 3.91 10E6/UL (ref 3.8–5.2)
SODIUM SERPL-SCNC: 138 MMOL/L (ref 135–145)
SODIUM SERPL-SCNC: 140 MMOL/L (ref 135–145)
SYSTOLIC BLOOD PRESSURE - MUSE: NORMAL MMHG
T AXIS - MUSE: 29 DEGREES
TROPONIN T SERPL HS-MCNC: 6 NG/L
TROPONIN T SERPL HS-MCNC: <6 NG/L
TSH SERPL DL<=0.005 MIU/L-ACNC: 4.03 UIU/ML (ref 0.3–4.2)
VALPROATE SERPL-MCNC: 78.5 UG/ML
VALPROATE SERPL-MCNC: 84.3 UG/ML
VALPROATE SERPL-MCNC: 85 UG/ML
VENTRICULAR RATE- MUSE: 92 BPM
WBC # BLD AUTO: 3.1 10E3/UL (ref 4–11)

## 2025-04-24 PROCEDURE — 250N000009 HC RX 250: Performed by: INTERNAL MEDICINE

## 2025-04-24 PROCEDURE — 96361 HYDRATE IV INFUSION ADD-ON: CPT

## 2025-04-24 PROCEDURE — 70496 CT ANGIOGRAPHY HEAD: CPT

## 2025-04-24 PROCEDURE — 250N000011 HC RX IP 250 OP 636: Performed by: EMERGENCY MEDICINE

## 2025-04-24 PROCEDURE — G0378 HOSPITAL OBSERVATION PER HR: HCPCS

## 2025-04-24 PROCEDURE — 250N000013 HC RX MED GY IP 250 OP 250 PS 637: Performed by: NURSE PRACTITIONER

## 2025-04-24 PROCEDURE — 80164 ASSAY DIPROPYLACETIC ACD TOT: CPT | Performed by: EMERGENCY MEDICINE

## 2025-04-24 PROCEDURE — 36415 COLL VENOUS BLD VENIPUNCTURE: CPT | Performed by: EMERGENCY MEDICINE

## 2025-04-24 PROCEDURE — A9585 GADOBUTROL INJECTION: HCPCS | Performed by: EMERGENCY MEDICINE

## 2025-04-24 PROCEDURE — 99223 1ST HOSP IP/OBS HIGH 75: CPT | Performed by: NURSE PRACTITIONER

## 2025-04-24 PROCEDURE — 36415 COLL VENOUS BLD VENIPUNCTURE: CPT | Performed by: INTERNAL MEDICINE

## 2025-04-24 PROCEDURE — 82248 BILIRUBIN DIRECT: CPT | Performed by: EMERGENCY MEDICINE

## 2025-04-24 PROCEDURE — 258N000003 HC RX IP 258 OP 636: Performed by: INTERNAL MEDICINE

## 2025-04-24 PROCEDURE — 70450 CT HEAD/BRAIN W/O DYE: CPT

## 2025-04-24 PROCEDURE — 99207 PR APP CREDIT; MD BILLING SHARED VISIT: CPT | Performed by: HOSPITALIST

## 2025-04-24 PROCEDURE — 255N000002 HC RX 255 OP 636: Performed by: EMERGENCY MEDICINE

## 2025-04-24 PROCEDURE — 258N000003 HC RX IP 258 OP 636: Performed by: HOSPITALIST

## 2025-04-24 PROCEDURE — 96374 THER/PROPH/DIAG INJ IV PUSH: CPT | Mod: 59

## 2025-04-24 PROCEDURE — 96376 TX/PRO/DX INJ SAME DRUG ADON: CPT

## 2025-04-24 PROCEDURE — 85610 PROTHROMBIN TIME: CPT | Performed by: EMERGENCY MEDICINE

## 2025-04-24 PROCEDURE — 80048 BASIC METABOLIC PNL TOTAL CA: CPT | Performed by: EMERGENCY MEDICINE

## 2025-04-24 PROCEDURE — 84484 ASSAY OF TROPONIN QUANT: CPT | Performed by: EMERGENCY MEDICINE

## 2025-04-24 PROCEDURE — 85730 THROMBOPLASTIN TIME PARTIAL: CPT | Performed by: EMERGENCY MEDICINE

## 2025-04-24 PROCEDURE — 85004 AUTOMATED DIFF WBC COUNT: CPT | Performed by: EMERGENCY MEDICINE

## 2025-04-24 PROCEDURE — 82310 ASSAY OF CALCIUM: CPT | Performed by: INTERNAL MEDICINE

## 2025-04-24 PROCEDURE — 250N000012 HC RX MED GY IP 250 OP 636 PS 637: Performed by: NURSE PRACTITIONER

## 2025-04-24 PROCEDURE — 84703 CHORIONIC GONADOTROPIN ASSAY: CPT | Performed by: EMERGENCY MEDICINE

## 2025-04-24 PROCEDURE — 250N000009 HC RX 250: Performed by: HOSPITALIST

## 2025-04-24 PROCEDURE — 99285 EMERGENCY DEPT VISIT HI MDM: CPT | Mod: 25

## 2025-04-24 PROCEDURE — 250N000013 HC RX MED GY IP 250 OP 250 PS 637: Performed by: INTERNAL MEDICINE

## 2025-04-24 PROCEDURE — 99223 1ST HOSP IP/OBS HIGH 75: CPT | Mod: AI | Performed by: INTERNAL MEDICINE

## 2025-04-24 PROCEDURE — 93005 ELECTROCARDIOGRAM TRACING: CPT

## 2025-04-24 PROCEDURE — 82140 ASSAY OF AMMONIA: CPT | Performed by: INTERNAL MEDICINE

## 2025-04-24 PROCEDURE — 70553 MRI BRAIN STEM W/O & W/DYE: CPT

## 2025-04-24 PROCEDURE — 99221 1ST HOSP IP/OBS SF/LOW 40: CPT | Performed by: STUDENT IN AN ORGANIZED HEALTH CARE EDUCATION/TRAINING PROGRAM

## 2025-04-24 PROCEDURE — 82140 ASSAY OF AMMONIA: CPT | Performed by: EMERGENCY MEDICINE

## 2025-04-24 PROCEDURE — 82077 ASSAY SPEC XCP UR&BREATH IA: CPT | Performed by: EMERGENCY MEDICINE

## 2025-04-24 PROCEDURE — 250N000009 HC RX 250: Performed by: EMERGENCY MEDICINE

## 2025-04-24 PROCEDURE — 84443 ASSAY THYROID STIM HORMONE: CPT | Performed by: NURSE PRACTITIONER

## 2025-04-24 PROCEDURE — 80164 ASSAY DIPROPYLACETIC ACD TOT: CPT | Performed by: INTERNAL MEDICINE

## 2025-04-24 RX ORDER — AMOXICILLIN 250 MG
1 CAPSULE ORAL 2 TIMES DAILY PRN
Status: DISCONTINUED | OUTPATIENT
Start: 2025-04-24 | End: 2025-04-25 | Stop reason: HOSPADM

## 2025-04-24 RX ORDER — OLANZAPINE 10 MG/1
10 TABLET, FILM COATED ORAL 2 TIMES DAILY
COMMUNITY

## 2025-04-24 RX ORDER — GADOBUTROL 604.72 MG/ML
9 INJECTION INTRAVENOUS ONCE
Status: COMPLETED | OUTPATIENT
Start: 2025-04-24 | End: 2025-04-24

## 2025-04-24 RX ORDER — LACTULOSE 10 G/15ML
20 SOLUTION ORAL 2 TIMES DAILY
Status: DISCONTINUED | OUTPATIENT
Start: 2025-04-24 | End: 2025-04-25 | Stop reason: HOSPADM

## 2025-04-24 RX ORDER — LEVOTHYROXINE SODIUM 150 UG/1
150 TABLET ORAL DAILY
Status: DISCONTINUED | OUTPATIENT
Start: 2025-04-24 | End: 2025-04-25 | Stop reason: HOSPADM

## 2025-04-24 RX ORDER — ONDANSETRON 2 MG/ML
4 INJECTION INTRAMUSCULAR; INTRAVENOUS EVERY 6 HOURS PRN
Status: DISCONTINUED | OUTPATIENT
Start: 2025-04-24 | End: 2025-04-25 | Stop reason: HOSPADM

## 2025-04-24 RX ORDER — PROCHLORPERAZINE MALEATE 10 MG
10 TABLET ORAL EVERY 6 HOURS PRN
Status: DISCONTINUED | OUTPATIENT
Start: 2025-04-24 | End: 2025-04-25 | Stop reason: HOSPADM

## 2025-04-24 RX ORDER — AMOXICILLIN 250 MG
2 CAPSULE ORAL 2 TIMES DAILY PRN
Status: DISCONTINUED | OUTPATIENT
Start: 2025-04-24 | End: 2025-04-25 | Stop reason: HOSPADM

## 2025-04-24 RX ORDER — SODIUM CHLORIDE 9 MG/ML
INJECTION, SOLUTION INTRAVENOUS CONTINUOUS
Status: DISCONTINUED | OUTPATIENT
Start: 2025-04-24 | End: 2025-04-24

## 2025-04-24 RX ORDER — OLANZAPINE 5 MG/1
10 TABLET, FILM COATED ORAL 2 TIMES DAILY
Status: DISCONTINUED | OUTPATIENT
Start: 2025-04-24 | End: 2025-04-25 | Stop reason: HOSPADM

## 2025-04-24 RX ORDER — LEVOCARNITINE 1 G/5ML
6000 INJECTION INTRAVENOUS ONCE
Status: DISCONTINUED | OUTPATIENT
Start: 2025-04-24 | End: 2025-04-24

## 2025-04-24 RX ORDER — BUPRENORPHINE AND NALOXONE 8; 2 MG/1; MG/1
1 FILM, SOLUBLE BUCCAL; SUBLINGUAL 2 TIMES DAILY
Status: DISCONTINUED | OUTPATIENT
Start: 2025-04-24 | End: 2025-04-25 | Stop reason: HOSPADM

## 2025-04-24 RX ORDER — IBUPROFEN 200 MG
600 TABLET ORAL EVERY 4 HOURS PRN
COMMUNITY

## 2025-04-24 RX ORDER — ONDANSETRON 4 MG/1
4 TABLET, ORALLY DISINTEGRATING ORAL EVERY 6 HOURS PRN
Status: DISCONTINUED | OUTPATIENT
Start: 2025-04-24 | End: 2025-04-25 | Stop reason: HOSPADM

## 2025-04-24 RX ORDER — IOPAMIDOL 755 MG/ML
67 INJECTION, SOLUTION INTRAVASCULAR ONCE
Status: COMPLETED | OUTPATIENT
Start: 2025-04-24 | End: 2025-04-24

## 2025-04-24 RX ADMIN — SODIUM CHLORIDE 100 ML: 9 INJECTION, SOLUTION INTRAVENOUS at 02:12

## 2025-04-24 RX ADMIN — GADOBUTROL 9 ML: 604.72 INJECTION INTRAVENOUS at 03:38

## 2025-04-24 RX ADMIN — BUPRENORPHINE AND NALOXONE 1 FILM: 8; 2 FILM BUCCAL; SUBLINGUAL at 20:07

## 2025-04-24 RX ADMIN — IOPAMIDOL 67 ML: 755 INJECTION, SOLUTION INTRAVENOUS at 02:12

## 2025-04-24 RX ADMIN — SODIUM CHLORIDE: 9 INJECTION, SOLUTION INTRAVENOUS at 06:10

## 2025-04-24 RX ADMIN — LACTULOSE 20 G: 20 SOLUTION ORAL at 20:08

## 2025-04-24 RX ADMIN — LEVOTHYROXINE SODIUM 150 MCG: 150 TABLET ORAL at 08:04

## 2025-04-24 RX ADMIN — LEVOCARNITINE 1300 MG: 1 INJECTION, SOLUTION INTRAVENOUS at 10:47

## 2025-04-24 RX ADMIN — LEVOCARNITINE: 1 INJECTION, SOLUTION INTRAVENOUS at 03:48

## 2025-04-24 RX ADMIN — LEVOCARNITINE 1300 MG: 1 INJECTION, SOLUTION INTRAVENOUS at 16:07

## 2025-04-24 RX ADMIN — OLANZAPINE 10 MG: 5 TABLET, FILM COATED ORAL at 20:08

## 2025-04-24 RX ADMIN — BUPRENORPHINE AND NALOXONE 1 FILM: 8; 2 FILM BUCCAL; SUBLINGUAL at 12:00

## 2025-04-24 RX ADMIN — LACTULOSE 20 G: 20 SOLUTION ORAL at 09:17

## 2025-04-24 ASSESSMENT — ACTIVITIES OF DAILY LIVING (ADL)
ADLS_ACUITY_SCORE: 18
ADLS_ACUITY_SCORE: 18
ADLS_ACUITY_SCORE: 17
ADLS_ACUITY_SCORE: 18
ADLS_ACUITY_SCORE: 18
ADLS_ACUITY_SCORE: 41
ADLS_ACUITY_SCORE: 41
ADLS_ACUITY_SCORE: 18
ADLS_ACUITY_SCORE: 15
ADLS_ACUITY_SCORE: 17
ADLS_ACUITY_SCORE: 18
ADLS_ACUITY_SCORE: 18
ADLS_ACUITY_SCORE: 41
ADLS_ACUITY_SCORE: 17
ADLS_ACUITY_SCORE: 17
ADLS_ACUITY_SCORE: 18
ADLS_ACUITY_SCORE: 17
ADLS_ACUITY_SCORE: 18
ADLS_ACUITY_SCORE: 41
ADLS_ACUITY_SCORE: 41

## 2025-04-24 ASSESSMENT — COLUMBIA-SUICIDE SEVERITY RATING SCALE - C-SSRS
2. HAVE YOU ACTUALLY HAD ANY THOUGHTS OF KILLING YOURSELF IN THE PAST MONTH?: NO
6. HAVE YOU EVER DONE ANYTHING, STARTED TO DO ANYTHING, OR PREPARED TO DO ANYTHING TO END YOUR LIFE?: NO
1. IN THE PAST MONTH, HAVE YOU WISHED YOU WERE DEAD OR WISHED YOU COULD GO TO SLEEP AND NOT WAKE UP?: NO

## 2025-04-24 NOTE — PROGRESS NOTES
Admission    Patient arrives to room 615 via cart from ED.  Care plan note:     Summary:  AMS, Depakote toxicity  DATE & TIME: 4/24/25 0058-0701   Cognitive Concerns/ Orientation : AxO x4, some difficulty with word finding and forgetful   BEHAVIOR & AGGRESSION TOOL COLOR: green, pleasant  ABNL VS/O2: VSS RA  MOBILITY: SBA  PAIN MANAGMENT: Denies  DIET: Regular  BOWEL/BLADDER: Continent, has not urinated since arrival to floor  ABNL LAB/BG: Ammonia 193, WBC 3.1, Creat 1.05  DRAIN/DEVICES: R PIV  ml/hr  TELEMETRY RHYTHM: NA  SKIN: Intact, trace edema BLE  TESTS/PROCEDURES: CT, CTA head and neck, MRI all done for code stroke- unremarkable. EKG NSR  D/C DAY/GOALS/PLACE: Pending  OTHER IMPORTANT INFO: Administering Levocarnitine control per Poison Control. Neuros intact, LUE tremulous.     Inpatient nursing criteria listed below were met:    Did you put disposition on whiteboard and in sticky note: Yes  Full skin assessment done (add LDA if skin issue present). Initials of 2nd RN :Yes, SE  Isolation education started/completed NA  Patient allergies verified with patient: Yes  Fall Risk? (Care plan updated, Education given and documented) Yes  Primary Care Plan initiated: Yes  Home medications documented in belongings flowsheet: NA  Patient belongings documented in belongings flowsheet: Yes  Reminder note (belongings/ medications) placed in discharge instructions:Yes  Admission profile/ required documentation complete: Yes  If patient is a 72 hour hold/Commitment are belongings removed from room and locked up? NA

## 2025-04-24 NOTE — PROGRESS NOTES
[facility]    Hospitalist Progress Note    Date of Admission:  4/24/2025    Assessment & Plan   Ambar Borges is a 50 year old female with past medical history of bipolar disorder on Depakote was brought to the ED with acute onset confusion.  Patient was noted to have word finding difficulty.  Reportedly had confusion which started around midnight tonight.        Altered mental status, presumed to be secondary to acute toxic encephalopathy due to hyperammonemia likely caused by Depakote     Poison control was consulted.  Ammonia level 193.  Depakote within therapeutic range for dionicio which is .  Depakote level was 78.3.  Acute confusion with no focal neurological deficits.  Symptoms began to resolve in the ED.  CT of the head, CTA head and neck without any acute intracranial abnormalities, occlusion of large extracranial vessels or hemodynamically significant stenosis.  Was not a candidate for thrombolytics or endovascular intervention due to quick resolution of symptoms and absence of hemodynamically significant stenosis or occlusion.  Neurology consulted.  MRI brain with no acute findings.  Neurology signed off.  Patient has been taking Depakote consistently for the past 2-3 weeks as administered by .  Patient presents with hyperammonemia with a therapeutic Depakote level, suggestive of impaired ammonia clearance of unknown etiology (consider liver dysfunction, urea cycle abnormalities, mitochondrial disorders).   Patient was given L-carnitine 6000 mg IV.  Per ED provider Poison control recommended to give L-carnitine 15 mg/kg every 6 hours until ammonia level within normal limits.  This plan was administered.  Also given lactulose for hyperammonemia.   - Patient feeling significantly improved next morning, back to baseline.  As ammonia level had normalized to 40, plan initially was to discharge patient home.  However repeat ammonia level later in the day increased again  to 80.  Patient clinically remained the same at baseline mentation.  However given ammonia trend up, canceled discharge plan.  Will give continue L-carnitine every 6 hours x 2 doses and continue to monitor ammonia levels every 6 hours.  Last received Depakote on 4/23 PM-likely needs at least 24 to 36 hours to washout.    History of bipolar disorder, anxiety.  On Zyprexa and Klonopin as needed, Depakote scheduled  H/o Malignant hyperthermia  Chronic back pain  Opioid use disorder  Psychiatry was consulted.  They recommended discontinuing the Depakote.  Patient will need to be tested for urea cycle disorders which often cause carnitine deficiencies.  Will defer to PCP to investigate this further,?  Likely needs referral to genetics for further testing as outpatient.  - Plan to continue on olanzapine.  - Close follow-up with primary psychiatrist as outpatient to consider additional mood stabilizing agents as indicated.  - Continue PTA maintenance Suboxone for opioid use disorder    Nausea with vomiting, Zofran IV or oral as needed.  EKG showed normal QTc/QT intervals.  Slightly elevated serum creatinine level, mild anion gap.  Anion gap 16.  Continue normal saline IV, check BMP in a.m.  Dizziness and giddiness.  Now significantly improved after IV fluid and L-carnitine.  CT and MRI brain negative for acute stroke.  EKG is also negative for ischemic changes or arrhythmia.          Medical Decision Making       Please see A&P for additional details of medical decision making.        Clinically Significant Risk Factors Present on Admission              # Anion Gap Metabolic Acidosis: Highest Anion Gap = 21 mmol/L in last 2 days, will monitor and treat as appropriate                    # Financial/Environmental Concerns:             Ana Luisa Freire MD  Text Page (7am - 6pm, M-F)  [facility]  Securely message with the Vocera Web Console (learn more here)  Text page via [employer]  Paging/Directory      Interval History   Feeling back to baseline.  Much improved.  Denies any pain.    -Data reviewed today: I reviewed all new labs and imaging results over the last 24 hours. I personally reviewed MRI scan with result as noted above    Physical Exam   Temp: 97.9  F (36.6  C) Temp src: Oral BP: (!) 148/93 Pulse: 89   Resp: 15 SpO2: 97 %      Vitals:    04/24/25 0203   Weight: 87.8 kg (193 lb 9 oz)     Vital Signs with Ranges  Temp:  [97.8  F (36.6  C)-98.9  F (37.2  C)] 97.9  F (36.6  C)  Pulse:  [] 89  Resp:  [9-17] 15  BP: (118-154)/() 148/93  SpO2:  [95 %-99 %] 97 %  No intake/output data recorded.    Constitutional: Alert, appears comfortable, in no acute distress  Respiratory: Non labored breathing, clear to auscultation bilaterally, no crackles or wheezes  Cardiovascular: Heart sounds regular rate and rhythm, no murmurs, no leg edema  GI: Abdomen is soft, non distended, non tender. Normal BS  Skin/Integumen: no rashes, no pressure sores  Neuro: alert, converses appropriately ,moving all extremities,  no facial asymmetry  Psych: mood and affect appropriate      Medications   Current Facility-Administered Medications   Medication Dose Route Frequency Provider Last Rate Last Admin     Current Facility-Administered Medications   Medication Dose Route Frequency Provider Last Rate Last Admin    buprenorphine HCl-naloxone HCl (SUBOXONE) 8-2 MG per film 1 Film  1 Film Sublingual BID Carlitos Stanford CNP   1 Film at 04/24/25 1200    lactulose (CHRONULAC) solution 20 g  20 g Oral BID Sonya Ng MD   20 g at 04/24/25 0917    levOCARNitine 1,300 mg in sodium chloride 0.9 % 281.5 mL intermittent infusion  15 mg/kg Intravenous Q6H Ana Luisa Freire MD        levothyroxine (SYNTHROID/LEVOTHROID) tablet 150 mcg  150 mcg Oral Daily Sonya Ng MD   150 mcg at 04/24/25 0804    OLANZapine (zyPREXA) tablet 10 mg  10 mg Oral BID Carlitos Stanford CNP           Data   Recent Labs    Lab 04/24/25  0622 04/24/25  0210   WBC  --  3.1*   HGB  --  12.3   MCV  --  96   PLT  --  170   INR  --  1.09    138   POTASSIUM 4.3 4.3   CHLORIDE 102 100   CO2 17* 22   BUN 11.8 11.2   CR 0.92 1.05*   ANIONGAP 21* 16*   MICHELET 8.9 9.2   * 115*   ALBUMIN  --  4.4   PROTTOTAL  --  7.1   BILITOTAL  --  0.3   ALKPHOS  --  109   ALT  --  36   AST  --  26       Imaging  Recent Results (from the past 24 hours)   CT Head w/o Contrast    Narrative    EXAM: CT HEAD W/O CONTRAST, CTA HEAD NECK W CONTRAST  LOCATION: [facility]  DATE/TIME: 4/24/2025 2:17 AM CDT    INDICATION: Code Stroke, rule out hemorrhage and evaluate for potential thrombolysis thrombectomy. PLEASE READ IMMEDIATELY.  COMPARISON: None.  CONTRAST: 67 mL Isovue   370  TECHNIQUE: Head and neck CT angiogram with IV contrast. Noncontrast head CT followed by axial helical CT images of the head and neck vessels obtained during the arterial phase of intravenous contrast administration. Axial 2D reconstructed images and   multiplanar 3D MIP reconstructed images of the head and neck vessels were performed by the technologist. Dose reduction techniques were used. All stenosis measurements made according to NASCET criteria unless otherwise specified.    FINDINGS:   NONCONTRAST HEAD CT:   INTRACRANIAL CONTENTS: No intracranial hemorrhage, extraaxial collection, or mass effect.  No CT evidence of acute infarct. Normal parenchymal attenuation. Normal ventricles and sulci.     VISUALIZED ORBITS/SINUSES/MASTOIDS: No intraorbital abnormality. Mild mucosal thickening scattered about the paranasal sinuses. No middle ear or mastoid effusion.    BONES/SOFT TISSUES: No acute abnormality.    HEAD CTA:  ANTERIOR CIRCULATION: No stenosis/occlusion, aneurysm, or high flow vascular malformation. Fetal origin of the left posterior cerebral artery from the anterior circulation.    POSTERIOR CIRCULATION: No stenosis/occlusion, aneurysm, or high flow  vascular malformation. Dominant right and smaller left vertebral artery contribute to a normal basilar artery.     DURAL VENOUS SINUSES: Expected enhancement of the major dural venous sinuses.    NECK CTA:  RIGHT CAROTID: No measurable stenosis or dissection.    LEFT CAROTID: No measurable stenosis or dissection.    VERTEBRAL ARTERIES: No focal stenosis or dissection. Dominant right and smaller left vertebral arteries.    AORTIC ARCH: Classic aortic arch anatomy with no significant stenosis at the origin of the great vessels.    NONVASCULAR STRUCTURES: Unremarkable.      Impression    IMPRESSION:   HEAD CT:  No acute intracranial process.    HEAD CTA:  No large vessel occlusion or hemodynamically significant stenosis.    NECK CTA:  No large vessel occlusion or hemodynamically significant stenosis.      Dr. Stafford  was contacted by me on  4/24/2025 2:21 AM CDT   with the preliminary report.     CTA Head Neck with Contrast    Narrative    EXAM: CT HEAD W/O CONTRAST, CTA HEAD NECK W CONTRAST  LOCATION: [facility]  DATE/TIME: 4/24/2025 2:17 AM CDT    INDICATION: Code Stroke, rule out hemorrhage and evaluate for potential thrombolysis thrombectomy. PLEASE READ IMMEDIATELY.  COMPARISON: None.  CONTRAST: 67 mL Isovue   370  TECHNIQUE: Head and neck CT angiogram with IV contrast. Noncontrast head CT followed by axial helical CT images of the head and neck vessels obtained during the arterial phase of intravenous contrast administration. Axial 2D reconstructed images and   multiplanar 3D MIP reconstructed images of the head and neck vessels were performed by the technologist. Dose reduction techniques were used. All stenosis measurements made according to NASCET criteria unless otherwise specified.    FINDINGS:   NONCONTRAST HEAD CT:   INTRACRANIAL CONTENTS: No intracranial hemorrhage, extraaxial collection, or mass effect.  No CT evidence of acute infarct. Normal parenchymal attenuation. Normal  ventricles and sulci.     VISUALIZED ORBITS/SINUSES/MASTOIDS: No intraorbital abnormality. Mild mucosal thickening scattered about the paranasal sinuses. No middle ear or mastoid effusion.    BONES/SOFT TISSUES: No acute abnormality.    HEAD CTA:  ANTERIOR CIRCULATION: No stenosis/occlusion, aneurysm, or high flow vascular malformation. Fetal origin of the left posterior cerebral artery from the anterior circulation.    POSTERIOR CIRCULATION: No stenosis/occlusion, aneurysm, or high flow vascular malformation. Dominant right and smaller left vertebral artery contribute to a normal basilar artery.     DURAL VENOUS SINUSES: Expected enhancement of the major dural venous sinuses.    NECK CTA:  RIGHT CAROTID: No measurable stenosis or dissection.    LEFT CAROTID: No measurable stenosis or dissection.    VERTEBRAL ARTERIES: No focal stenosis or dissection. Dominant right and smaller left vertebral arteries.    AORTIC ARCH: Classic aortic arch anatomy with no significant stenosis at the origin of the great vessels.    NONVASCULAR STRUCTURES: Unremarkable.      Impression    IMPRESSION:   HEAD CT:  No acute intracranial process.    HEAD CTA:  No large vessel occlusion or hemodynamically significant stenosis.    NECK CTA:  No large vessel occlusion or hemodynamically significant stenosis.      Dr. Stafford  was contacted by me on  4/24/2025 2:21 AM CDT   with the preliminary report.     MR Brain w/o & w Contrast    Narrative    EXAM: MR BRAIN W/O and W CONTRAST  LOCATION: [facility]  DATE: 4/24/2025    INDICATION: confusion, expressive aphasia. rule out stroke  COMPARISON: CT head April 24, 2025   CONTRAST: 9 mL Gadavist  TECHNIQUE: Routine multiplanar multisequence head MRI without and with intravenous contrast.    FINDINGS:  INTRACRANIAL CONTENTS: No acute or subacute infarct. No mass, acute hemorrhage, or extra-axial fluid collections. Normal brain parenchymal signal. Normal ventricles and sulci.  Normal position of the cerebellar tonsils. No pathologic contrast enhancement.    SELLA: No abnormality accounting for technique.    OSSEOUS STRUCTURES/SOFT TISSUES: Normal marrow signal. The major intracranial vascular flow voids are maintained.     ORBITS: No abnormality accounting for technique.     SINUSES/MASTOIDS: Mild mucosal thickening scattered about the paranasal sinuses. No middle ear or mastoid effusion.       Impression    IMPRESSION:  1.  Normal head MRI.

## 2025-04-24 NOTE — ED PROVIDER NOTES
Emergency Department Note      History of Present Illness     Chief Complaint   Altered Mental Status      HPI   Ambar Borges is a 50 year old female presented to the emergency department with altered mental status.   reports that she was normal at around 11 PM.  He heard her dropping things and went to check on her.  She seemed confused to him and was saying the wrong words.  She was tremulous.  There has been no recent illness.  Only new change has been that he started to give her Depakote regularly about 2 to 3 weeks ago.  Prior to that, he does not think she was taking it regularly.    Independent Historian    as detailed above.    Past Medical History     Medical History and Problem List   Past Medical History:   Diagnosis Date    Chronic low back pain     Primary thyroid cancer (H)        Medications   B Complex Vitamins (VITAMIN  B COMPLEX) tablet  buprenorphine HCl-naloxone HCl (SUBOXONE) 8-2 MG per film  clonazePAM (KLONOPIN) 0.5 MG tablet  ibuprofen (ADVIL) 200 MG capsule  levothyroxine (SYNTHROID, LEVOTHROID) 150 MCG tablet  OLANZapine (ZYPREXA) 5 MG tablet  OLANZapine (ZYPREXA) 5 MG tablet  progesterone (PROMETRIUM) 100 MG capsule  Turmeric (QC TUMERIC COMPLEX PO)  venlafaxine (EFFEXOR XR) 75 MG 24 hr capsule  venlafaxine (EFFEXOR XR) 75 MG 24 hr capsule    Surgical History   Past Surgical History:   Procedure Laterality Date    HC HEMORROIDECTOMY, EXTERNAL, SINGLE COLUMN/GROUP  2006     REPAIR OF NASAL SEPTUM  2008    HC TOOTH EXTRACTION W/FORCEP  2010    nerve damage to sensation of lower jaw    MAMMOPLASTY AUGMENTATION  2004    THYROIDECTOMY  6/1/2012    Procedure:THYROIDECTOMY; Total Thyroidectomy and Selective Neck Dissection; Surgeon:KELSEY DONOVAN; Location: OR     Physical Exam     Patient Vitals for the past 24 hrs:   BP Temp Pulse Resp SpO2 Weight   04/24/25 0241 137/83 -- 95 (!) 9 -- --   04/24/25 0236 (!) 145/85 -- 96 11 -- --   04/24/25 0231 (!) 154/89 -- 98 12 -- --    04/24/25 0221 (!) 151/91 -- 101 17 -- --   04/24/25 0207 -- 98.9  F (37.2  C) -- -- -- --   04/24/25 0205 (!) 153/98 -- 109 16 95 % --   04/24/25 0203 -- -- -- -- -- 87.8 kg (193 lb 9 oz)     Physical Exam  General: Sitting up in bed  Eyes:  The pupils are equal and round    Conjunctivae and sclerae are normal  ENT:    Atraumatic face  Neck:  Normal range of motion  CV:  Regular rate, regular rhythm     Skin warm and well perfused   Resp:  Non labored breathing on room air    No tachypnea    No cough heard    Lungs clear bilaterally  GI:  Abdomen is soft, there is no rigidity    No distension    No rebound tenderness     No abdominal tenderness  MS:  Normal muscular tone  Skin:  No rash or acute skin lesions noted  Neuro:   Awake, alert.  Confused    Speech is clear but expressive aphasia    Finger to nose intact bilaterally    SILT on bilateral UE/LE    Oriented to name, location only    Face is symmetric.     Moves all extremities equally    No clonus    Has asterixis    Normal reflexes  Psych: Normal affect.  Appropriate interactions.    Diagnostics     Lab Results   Labs Ordered and Resulted from Time of ED Arrival to Time of ED Departure   BASIC METABOLIC PANEL - Abnormal       Result Value    Sodium 138      Potassium 4.3      Chloride 100      Carbon Dioxide (CO2) 22      Anion Gap 16 (*)     Urea Nitrogen 11.2      Creatinine 1.05 (*)     GFR Estimate 64      Calcium 9.2      Glucose 115 (*)    CBC WITH PLATELETS AND DIFFERENTIAL - Abnormal    WBC Count 3.1 (*)     RBC Count 3.91      Hemoglobin 12.3      Hematocrit 37.6      MCV 96      MCH 31.5      MCHC 32.7      RDW 15.0      Platelet Count 170      % Neutrophils 35      % Lymphocytes 55      % Monocytes 8      % Eosinophils 1      % Basophils 1      % Immature Granulocytes 0      NRBCs per 100 WBC 0      Absolute Neutrophils 1.1 (*)     Absolute Lymphocytes 1.7      Absolute Monocytes 0.3      Absolute Eosinophils 0.0      Absolute Basophils 0.0       Absolute Immature Granulocytes 0.0      Absolute NRBCs 0.0     AMMONIA - Abnormal    Ammonia 193 (*)    INR - Normal    INR 1.09     PARTIAL THROMBOPLASTIN TIME - Normal    aPTT 27     TROPONIN T, HIGH SENSITIVITY - Normal    Troponin T, High Sensitivity 6     HCG QUALITATIVE PREGNANCY - Normal    hCG Serum Qualitative Negative     VALPROIC ACID - Normal    Valproic acid 78.5     HEPATIC FUNCTION PANEL - Normal    Protein Total 7.1      Albumin 4.4      Bilirubin Total 0.3      Alkaline Phosphatase 109      AST 26      ALT 36      Bilirubin Direct 0.17     ETHYL ALCOHOL LEVEL - Normal    Alcohol ethyl <0.01     GLUCOSE MONITOR NURSING POCT   TROPONIN T, HIGH SENSITIVITY       Imaging   MR Brain w/o & w Contrast   Final Result   IMPRESSION:   1.  Normal head MRI.      CTA Head Neck with Contrast   Final Result   IMPRESSION:    HEAD CT:   No acute intracranial process.      HEAD CTA:   No large vessel occlusion or hemodynamically significant stenosis.      NECK CTA:   No large vessel occlusion or hemodynamically significant stenosis.        Dr. Stafford  was contacted by me on  4/24/2025 2:21 AM CDT   with the preliminary report.         CT Head w/o Contrast   Final Result   IMPRESSION:    HEAD CT:   No acute intracranial process.      HEAD CTA:   No large vessel occlusion or hemodynamically significant stenosis.      NECK CTA:   No large vessel occlusion or hemodynamically significant stenosis.        Dr. Stafford  was contacted by me on  4/24/2025 2:21 AM CDT   with the preliminary report.           EKG     ECG results from 04/24/25   EKG 12-lead, tracing only     Value    Systolic Blood Pressure     Diastolic Blood Pressure     Ventricular Rate 92    Atrial Rate 92    OH Interval 150    QRS Duration 90        QTc 447    P Axis 27    R AXIS 19    T Axis 29    Interpretation ECG      Sinus rhythm  Normal ECG  When compared with ECG of 12-Oct-2024 13:10,  Questionable change in QRS axis       Independent  Interpretation   CT Head: No intracranial hemorrhage.    ED Course      Medications Administered   Medications   iopamidol (ISOVUE-370) solution 67 mL (67 mLs Intravenous $Given 4/24/25 0212)     And   sodium chloride 0.9 % bag for CT scan flush (100 mLs As instructed $Given 4/24/25 0212)   Carnitine    Discussion of Management   Spoke to stroke neurology  Spoke to [place] poison control  Admitting Hospitalist, Dr. Ng    ED Course   Past medical records, nursing notes, and vitals reviewed.    Medical Decision Making / Diagnosis       AGUILAR Borges is a 50 year old female presented to the emergency department with altered mental status.  Possible expressive aphasia which prompted code stroke.  CT imaging with no large vessel occlusion.  Stroke neurology evaluated the patient and given minor and improving symptoms, TNK not recommended.  Given the medication change with the Depakote per the  and presentation today, this did prompt Depakote and ammonia level.  Ammonia level was very elevated.  Suspect that this is due to Depakote.  I spoke to [place] poison control who does see patients with a normal Depakote but elevated ammonia and would recommend treatment with carnitine 100 mg/kg up to 6 g maximum initial loading dose and then 15 mg/kg every 6 hours until ammonia level normal.  Recommended repeat Depakote and ammonia level in the morning.  MRI brain was obtained and this shows no stroke.  Do think her confusion is from the elevated ammonia.  Talked to hospitalist for admission.  Do not think infection is cause of presentation given no infectious symptoms earlier in the day and I doubt meningitis or encephalitis.    Disposition   The patient was admitted to the hospital.     Diagnosis     ICD-10-CM    1. Unintentional poisoning by valproate  T42.6X1A       2. Increased ammonia level  R79.89       3. Confusion  R41.0            MD Rivera Cotton, Maryam Sneed MD  04/24/25  0445

## 2025-04-24 NOTE — ED NOTES
"[facility]  ED Nurse Handoff Report    ED Chief complaint: Altered Mental Status      ED Diagnosis:   Final diagnoses:   Unintentional poisoning by valproate   Increased ammonia level   Confusion       Code Status: Full Code    Allergies: No Known Allergies    Patient Story: pt with hx of mental health issues (on depakote) presents to ED with  for sudden altered mental status. Last known well around 2300. At midnight,  woke up to a crash in the living room where pt had dropped a glass. Pt seemed very irritable, kept calling the glass \"a drawer\" and very confused and twitchy. Upon arrival in ED, pt presents with word finding difficulty and confusion - unable to tell staff how old she is and what month it is. Tier 1 code stroke called - labs show high ammonia level from what we assume depakote toxicity - levocarnitine given IVP after conferring with poison control.   Focused Assessment:  see above    Treatments and/or interventions provided:   Results for orders placed or performed during the hospital encounter of 04/24/25   CT Head w/o Contrast     Status: None    Narrative    EXAM: CT HEAD W/O CONTRAST, CTA HEAD NECK W CONTRAST  LOCATION: [facility]  DATE/TIME: 4/24/2025 2:17 AM CDT    INDICATION: Code Stroke, rule out hemorrhage and evaluate for potential thrombolysis thrombectomy. PLEASE READ IMMEDIATELY.  COMPARISON: None.  CONTRAST: 67 mL Isovue   370  TECHNIQUE: Head and neck CT angiogram with IV contrast. Noncontrast head CT followed by axial helical CT images of the head and neck vessels obtained during the arterial phase of intravenous contrast administration. Axial 2D reconstructed images and   multiplanar 3D MIP reconstructed images of the head and neck vessels were performed by the technologist. Dose reduction techniques were used. All stenosis measurements made according to NASCET criteria unless otherwise specified.    FINDINGS:   NONCONTRAST HEAD " CT:   INTRACRANIAL CONTENTS: No intracranial hemorrhage, extraaxial collection, or mass effect.  No CT evidence of acute infarct. Normal parenchymal attenuation. Normal ventricles and sulci.     VISUALIZED ORBITS/SINUSES/MASTOIDS: No intraorbital abnormality. Mild mucosal thickening scattered about the paranasal sinuses. No middle ear or mastoid effusion.    BONES/SOFT TISSUES: No acute abnormality.    HEAD CTA:  ANTERIOR CIRCULATION: No stenosis/occlusion, aneurysm, or high flow vascular malformation. Fetal origin of the left posterior cerebral artery from the anterior circulation.    POSTERIOR CIRCULATION: No stenosis/occlusion, aneurysm, or high flow vascular malformation. Dominant right and smaller left vertebral artery contribute to a normal basilar artery.     DURAL VENOUS SINUSES: Expected enhancement of the major dural venous sinuses.    NECK CTA:  RIGHT CAROTID: No measurable stenosis or dissection.    LEFT CAROTID: No measurable stenosis or dissection.    VERTEBRAL ARTERIES: No focal stenosis or dissection. Dominant right and smaller left vertebral arteries.    AORTIC ARCH: Classic aortic arch anatomy with no significant stenosis at the origin of the great vessels.    NONVASCULAR STRUCTURES: Unremarkable.      Impression    IMPRESSION:   HEAD CT:  No acute intracranial process.    HEAD CTA:  No large vessel occlusion or hemodynamically significant stenosis.    NECK CTA:  No large vessel occlusion or hemodynamically significant stenosis.      Dr. Stafford  was contacted by me on  4/24/2025 2:21 AM CDT   with the preliminary report.     CTA Head Neck with Contrast     Status: None    Narrative    EXAM: CT HEAD W/O CONTRAST, CTA HEAD NECK W CONTRAST  LOCATION: [facility]  DATE/TIME: 4/24/2025 2:17 AM CDT    INDICATION: Code Stroke, rule out hemorrhage and evaluate for potential thrombolysis thrombectomy. PLEASE READ IMMEDIATELY.  COMPARISON: None.  CONTRAST: 67 mL Isovue    370  TECHNIQUE: Head and neck CT angiogram with IV contrast. Noncontrast head CT followed by axial helical CT images of the head and neck vessels obtained during the arterial phase of intravenous contrast administration. Axial 2D reconstructed images and   multiplanar 3D MIP reconstructed images of the head and neck vessels were performed by the technologist. Dose reduction techniques were used. All stenosis measurements made according to NASCET criteria unless otherwise specified.    FINDINGS:   NONCONTRAST HEAD CT:   INTRACRANIAL CONTENTS: No intracranial hemorrhage, extraaxial collection, or mass effect.  No CT evidence of acute infarct. Normal parenchymal attenuation. Normal ventricles and sulci.     VISUALIZED ORBITS/SINUSES/MASTOIDS: No intraorbital abnormality. Mild mucosal thickening scattered about the paranasal sinuses. No middle ear or mastoid effusion.    BONES/SOFT TISSUES: No acute abnormality.    HEAD CTA:  ANTERIOR CIRCULATION: No stenosis/occlusion, aneurysm, or high flow vascular malformation. Fetal origin of the left posterior cerebral artery from the anterior circulation.    POSTERIOR CIRCULATION: No stenosis/occlusion, aneurysm, or high flow vascular malformation. Dominant right and smaller left vertebral artery contribute to a normal basilar artery.     DURAL VENOUS SINUSES: Expected enhancement of the major dural venous sinuses.    NECK CTA:  RIGHT CAROTID: No measurable stenosis or dissection.    LEFT CAROTID: No measurable stenosis or dissection.    VERTEBRAL ARTERIES: No focal stenosis or dissection. Dominant right and smaller left vertebral arteries.    AORTIC ARCH: Classic aortic arch anatomy with no significant stenosis at the origin of the great vessels.    NONVASCULAR STRUCTURES: Unremarkable.      Impression    IMPRESSION:   HEAD CT:  No acute intracranial process.    HEAD CTA:  No large vessel occlusion or hemodynamically significant stenosis.    NECK CTA:  No large vessel occlusion  or hemodynamically significant stenosis.      Dr. Stafford  was contacted by me on  4/24/2025 2:21 AM CDT   with the preliminary report.     Basic metabolic panel     Status: Abnormal   Result Value Ref Range    Sodium 138 135 - 145 mmol/L    Potassium 4.3 3.4 - 5.3 mmol/L    Chloride 100 98 - 107 mmol/L    Carbon Dioxide (CO2) 22 22 - 29 mmol/L    Anion Gap 16 (H) 7 - 15 mmol/L    Urea Nitrogen 11.2 6.0 - 20.0 mg/dL    Creatinine 1.05 (H) 0.51 - 0.95 mg/dL    GFR Estimate 64 >60 mL/min/1.73m2    Calcium 9.2 8.8 - 10.4 mg/dL    Glucose 115 (H) 70 - 99 mg/dL   INR     Status: Normal   Result Value Ref Range    INR 1.09 0.85 - 1.15   Partial thromboplastin time     Status: Normal   Result Value Ref Range    aPTT 27 22 - 38 Seconds   Troponin T, High Sensitivity     Status: Normal   Result Value Ref Range    Troponin T, High Sensitivity 6 <=14 ng/L   hCG Qualitative Pregnancy     Status: Normal   Result Value Ref Range    hCG Serum Qualitative Negative Negative   CBC with platelets and differential     Status: Abnormal   Result Value Ref Range    WBC Count 3.1 (L) 4.0 - 11.0 10e3/uL    RBC Count 3.91 3.80 - 5.20 10e6/uL    Hemoglobin 12.3 11.7 - 15.7 g/dL    Hematocrit 37.6 35.0 - 47.0 %    MCV 96 78 - 100 fL    MCH 31.5 26.5 - 33.0 pg    MCHC 32.7 31.5 - 36.5 g/dL    RDW 15.0 10.0 - 15.0 %    Platelet Count 170 150 - 450 10e3/uL    % Neutrophils 35 %    % Lymphocytes 55 %    % Monocytes 8 %    % Eosinophils 1 %    % Basophils 1 %    % Immature Granulocytes 0 %    NRBCs per 100 WBC 0 <1 /100    Absolute Neutrophils 1.1 (L) 1.6 - 8.3 10e3/uL    Absolute Lymphocytes 1.7 0.8 - 5.3 10e3/uL    Absolute Monocytes 0.3 0.0 - 1.3 10e3/uL    Absolute Eosinophils 0.0 0.0 - 0.7 10e3/uL    Absolute Basophils 0.0 0.0 - 0.2 10e3/uL    Absolute Immature Granulocytes 0.0 <=0.4 10e3/uL    Absolute NRBCs 0.0 10e3/uL   Ammonia (on ice)     Status: Abnormal   Result Value Ref Range    Ammonia 193 (HH) 11 - 51 umol/L   Valproic acid (Depakote  level)     Status: Normal   Result Value Ref Range    Valproic acid 78.5   ug/mL   Hepatic function panel     Status: Normal   Result Value Ref Range    Protein Total 7.1 6.4 - 8.3 g/dL    Albumin 4.4 3.5 - 5.2 g/dL    Bilirubin Total 0.3 <=1.2 mg/dL    Alkaline Phosphatase 109 40 - 150 U/L    AST 26 0 - 45 U/L    ALT 36 0 - 50 U/L    Bilirubin Direct 0.17 0.00 - 0.30 mg/dL   Alcohol level blood     Status: Normal   Result Value Ref Range    Alcohol ethyl <0.01 <=0.01 g/dL   EKG 12-lead, tracing only     Status: None (Preliminary result)   Result Value Ref Range    Systolic Blood Pressure  mmHg    Diastolic Blood Pressure  mmHg    Ventricular Rate 92 BPM    Atrial Rate 92 BPM    [place] Interval 150 ms    QRS Duration 90 ms     ms    QTc 447 ms    P Axis 27 degrees    R AXIS 19 degrees    T Axis 29 degrees    Interpretation ECG       Sinus rhythm  Normal ECG  When compared with ECG of 12-Oct-2024 13:10,  Questionable change in QRS axis     CBC with Platelets & Differential     Status: Abnormal    Narrative    The following orders were created for panel order CBC with Platelets & Differential.  Procedure                               Abnormality         Status                     ---------                               -----------         ------                     CBC with platelets and ...[[id number]]  Abnormal            Final result                 Please view results for these tests on the individual orders.       Patient's response to treatments and/or interventions: fair    To be done/followed up on inpatient unit:  monitor for neuro    Does this patient have any cognitive concerns?: Disoriented to time and Disoriented to situation    Activity level - Baseline/Home:  Independent  Activity Level - Current:   Independent    Patient's Preferred language: English   Needed?: No    Isolation: None  Infection: Not Applicable  Patient tested for COVID 19 prior to admission: NO  Bariatric?: No    Vital  Signs:   Vitals:    04/24/25 0221 04/24/25 0231 04/24/25 0236 04/24/25 0241   BP: (!) 151/91 (!) 154/89 (!) 145/85 137/83   Pulse: 101 98 96 95   Resp: 17 12 11 (!) 9   Temp:       SpO2:       Weight:           Cardiac Rhythm:Cardiac Rhythm: Normal sinus rhythm    Was the PSS-3 completed:   Yes  What interventions are required if any?               Family Comments:  at bedside  OBS brochure/video discussed/provided to patient/family: N/A              Name of person given brochure if not patient: n/a              Relationship to patient: n/a    For the majority of the shift this patient's behavior was Green.   Behavioral interventions performed were reassurance and information.    ED NURSE PHONE NUMBER: *[phone]

## 2025-04-24 NOTE — ED TRIAGE NOTES
" states he heard a loud noise around midnight. His wife was confused and was dropping things in the kitchen. LNW was around 2330.  noted pt kept calling a glass \"drawer\".         "

## 2025-04-24 NOTE — CONSULTS
"[facility]     Stroke Code Note          History of Present Illness     Chief Complaint: Altered Mental Status      Ambar Borges is a 50 year old woman with KRYSTIN, MDD, alcohol use disorder, and opioid use disorder that is in remission with suboxone, who per  has not had any major medication changes other than reliably taking Depakote for the last 2 weeks (previously was not taking it as prescribed but now  has taken over the administration since 2 weeks). LKW was 23:00 when  had last seen her well after which around midnight he started hearing loud noises of her dropping things and on checking on her. She seemed wobbly and repeating the same words mostly \"I just want to be left alone\" and seemed irritable and had limited vocabulary. Patient reports remebering all of this and states that she at that point she wanted to be left alone and did not feel like talking.  reports that he diod not notice any focal weakness or facial droop at that time. ED evaluation was concerning for confusion vs difficulty with speech so a code was activated. On my exam patient scored a NIHSS of 3 for LOC questions -2 and mild loss in fluency of speech - 1. Additionally patient has e/o asterixis in b/l upper extremities.    Labs: Ammonia levels resulted back at 193 - chart review suggest no h/o liver disease or previously elevated ammonia levels. Valproate level 78.5 (Therapeutic Range:  ug/mL with epilepsy and dionicio patients:  ug/mL). Normal LFT .            Past Medical History     Stroke risk factors: none    Preadmission antithrombotic regimen: none    Modified Cowden Score (Pre-morbid)  0-No deficits                   Assessment and Plan       1.  Encephalopathy/Speech changes     Intravenous Thrombolysis  Not given due to:   - minor/isolated/quickly resolving symptoms  - stroke mimic: possibly encephalopathic from elevated ammonia levels     Endovascular " "Treatment  Not initiated due to absence of proximal vessel occlusion     Plan:  - MRI Brain w/wo contrast to rule out acute stroke     - If MRI comes back negative for stroke, the current presentation is likely encephalopathy due to elevated ammonia levels. Given that patient's  reports that consistent Depakote intake for the last 2 weeks as the only recent change in medications, it could be that the hyperammonemia is driven by the Depakote (more common if you are supratherapeutic but can happen in normal therapeutic range as well). If so, we will need Psychiatry guidance on how to lower/taper her off of Depakote. Additionally she will need Lactulose and L carnitine to decrease the ammonia level.      ___________________________________________________________________    Clarice Henderson MD  Vascular Neurology    To page me or covering stroke neurology team member, click here: AMCOM  Choose \"On Call\" tab at top, then select \"NEUROLOGY/ALL SITES\" from middle drop-down box, press Enter, then look for \"stroke\" or \"telestroke\" for your site.  ___________________________________________________________________        Imaging/Labs   (personally reviewed)    CT head: No acute ischemia/hemorrhage  CTA head/neck: No LVO/critical stenoses         Physical Examination     BP: 137/83   Pulse: 95   Resp: (!) 9   Temp: 98.9  F (37.2  C)       SpO2: 95 %       Weight: 87.8 kg (193 lb 9 oz)    Wt Readings from Last 2 Encounters:   04/24/25 87.8 kg (193 lb 9 oz)   10/12/24 62.2 kg (137 lb 1.6 oz)       General Exam  General:  patient lying in bed without any acute distress    HEENT:  normocephalic/atraumatic  Cardio:  unable to test due to nature of tele exam  Pulmonary:  unable to test due to nature of tele exam  Abdomen:  unable to test due to nature of tele exam  Extremities:  unable to test due to nature of tele exam  Skin:  no obvious bruises or lesions seen from tele     Neuro Exam  Mental Status:   alert, follows all " commands, difficulty with month and age orientation question but oriented to self,  and current health condition and location  Cranial Nerves:  visual fields intact (tested by nurse), EOMI with normal smooth pursuit, facial sensation intact and symmetric (tested by nurse), facial movements symmetric, hearing not formally tested but intact to conversation, no dysarthria  Motor:  able to move all limbs antigravity spontaneously with no signs of hemiparesis observed, no pronator drift, asterixis+ in b/l UE    Reflexes:  unable to test (telestroke)  Sensory:  light touch sensation intact and symmetric throughout upper and lower extremities (assessed by nurse), no extinction on double simultaneous stimulation (assessed by nurse)  Coordination:  normal finger-to-nose and heel-to-shin bilaterally without dysmetria  Station/Gait:  unable to test due to telestroke        Stroke Scales       NIHSS  1a. Level of Consciousness 0-->Alert, keenly responsive   1b. LOC Questions 2-->Answers neither question correctly   1c. LOC Commands 0-->Performs both tasks correctly   2.   Best Gaze 0-->Normal   3.   Visual 0-->No visual loss   4.   Facial Palsy 0-->Normal symmetrical movements   5a. Motor Arm, Left 0-->No drift, limb holds 90 (or 45) degrees for full 10 secs   5b. Motor Arm, Right 0-->No drift, limb holds 90 (or 45) degrees for full 10 secs   6a. Motor Leg, Left 0-->No drift, leg holds 30 degree position for full 5 secs   6b. Motor Leg, right 0-->No drift, leg holds 30 degree position for full 5 secs   7.   Limb Ataxia 0-->Absent   8.   Sensory 0-->Normal, no sensory loss   9.   Best Language 1-->Mild-to-moderate aphasia, some obvious loss of fluency or facility of comprehension, without significant limitation on ideas expressed or form of expression. Reduction of speech and/or comprehension, however, makes conversation. . . (see row details) (mild loss of fluency)   10. Dysarthria 0-->Normal   11. Extinction and  Inattention  0-->No abnormality   Total 3 (04/24/25 0230)            Labs     CBC  Lab Results   Component Value Date    HGB 12.3 04/24/2025    HCT 37.6 04/24/2025    WBC 3.1 (L) 04/24/2025     04/24/2025       BMP  Lab Results   Component Value Date     04/24/2025    POTASSIUM 4.3 04/24/2025    CHLORIDE 100 04/24/2025    CO2 22 04/24/2025    BUN 11.2 04/24/2025    CR 1.05 (H) 04/24/2025     (H) 04/24/2025    MICHELET 9.2 04/24/2025       INR  INR   Date Value Ref Range Status   04/24/2025 1.09 0.85 - 1.15 Final       Data   Stroke Code Data  (for stroke code with tele)  Stroke code activated 04/24/25  0208   First stroke provider response 04/24/25  0208   Video start time 04/24/25  0219   Video end time 04/24/25  0242   Last known normal 04/23/25  2300   Time of discovery (or onset of symptoms)  04/24/25  0000   Head CT read by Stroke Neuro Provider 04/24/25  0217   Was stroke code de-escalated? Yes  04/24/25  0244     Telestroke Service Details  Type of service telemedicine diagnostic assessment of acute neurological changes   Reason telemedicine is appropriate patient requires assessment with a specialist for diagnosis and treatment of neurological symptoms   Mode of transmission secure interactive audio and video communication per Swetha   Originating site (patient location) [facility]    Distant site (provider location) Provider remote site        Clinically Significant Risk Factors Present on Admission                                 # Financial/Environmental Concerns:             Time Spent on this Encounter   Billing: I saw Ambar Borges on 04/24/25 as a STROKE CODE ACTIVATION.  At the time of my evaluation, Ambar Borges was in critical condition due to suspected acute ischemic stroke (new onset neurologic deficits consisting of speech difficulty). she is/was at high risk of neurologic deterioration from complications of stroke or stroke reperfusion therapy.   Both intravenous thrombolysis and endovascular thrombectomy were considered, but were ultimately deferred upon completion of her emergent clinical evaluation and review of her stat neuroimaging. I spent 45 minutes critical care decision-making time emergently evaluating and managing this patient's stroke code activation in conjunction with the emergency department physician/house officer, nursing, flying squad, pharmacist, neuroradiologist, and/or stroke residents/fellow/DIOMEDES.

## 2025-04-24 NOTE — PROGRESS NOTES
RECEIVING UNIT ED HANDOFF REVIEW    ED Nurse Handoff Report was reviewed by: Jayde Real RN on April 24, 2025 at 5:08 AM

## 2025-04-24 NOTE — H&P
[facility] History and Physical  Department of Internal Medicine    Patient Name: Ambar Borges MRN# [id number]   Age: 50 year old YOB: 1975     Date of Admission:4/24/2025      Primary care provider: No Ref-Primary, Physician  Date of Service: 4/24/2025  Admitting Team: NOC 2         Assessment and Plan:   Ambar Borges is a 50 year old female with past medical history of bipolar disorder on Depakote was brought to the ED with acute onset confusion.  Patient was noted to have word finding difficulty.  Reportedly had confusion which started around midnight tonight.      Altered mental status, presumed to be secondary to acute toxic encephalopathy due to hyperammonemia likely caused by Depakote toxicity.  Poison control was consulted.  Ammonia level 193.  Depakote within therapeutic range for dionicio which is .  Depakote level was 78.3.  Acute confusion with no focal neurological deficits.  Symptoms began to resolve in the ED.  CT of the head, CTA head and neck without any acute intracranial abnormalities, occlusion of large extracranial vessels or hemodynamically significant stenosis.  Was not a candidate for thrombolytics or endovascular intervention due to quick resolution of symptoms and absence of hemodynamically significant stenosis or occlusion.  Neurology consulted.  Neurology recommended not to pursue any further studies if MRI of the brain is negative as this is likely a stroke mimic due to hyperammonemia caused by Depakote toxicity.  Depakote toxicity, suspected.  Poison control was contacted.  Depakote toxicity can occur with valproic acid level within normal therapeutic limits.  This could be the cause of hyperammonemia.  Patient was given L-carnitine 6000 mg IV.  Per ED provider Poison control recommended to give L-carnitine 15 mg/kg every 6 hours until ammonia level within normal limits.  May give lactulose for hyperammonemia.  Check Depakote level in a.m. ordered  L-carnitine 15 mg/kg every 6.  Discontinue when ammonia level within normal limits.  History of bipolar disorder, anxiety.  On Zyprexa and Klonopin as needed, Depakote scheduled  Malignant hyperthermia, history of.   Chronic back pain, lower back  Nausea with vomiting, Zofran IV or oral as needed.  EKG showed normal QTc/QT intervals.  Slightly elevated serum creatinine level, mild anion gap.  Anion gap 16.  Continue normal saline IV, check BMP in a.m.  Dizziness and giddiness.  Now significantly improved after IV fluid and L-carnitine.  CT and MRI brain negative for acute stroke.  EKG is also negative for ischemic changes or arrhythmia.  Dizziness likely due to Depakote toxicity, nausea vomiting with intravascular volume contraction.  Continue IV fluids.    History of mammoplasty and breast augmentation    CODE: Full code  Diet/IVF: Normal saline at 100 mL/h  DVT ppx: SCDs while in bed bound  Disposition/Admission Status: Inpatient    Sonya Ng MD  Internal Medicine Staff Hospitalist  [facility]  Pager: [phone]       Chief Complaint:   Altered mental status         HPI:   This is a 50-year-old female patient who was brought to the ED by her  when she was noted to be acutely confused.  Patient reportedly around 11 PM was found by her  to be acutely confused and irritable and dropping things and asking to be left alone.  Her  woke up from sleep hearing loud noises of her dropping things    History is obtained partly from chart review and from the patient.  When I came to talk to the patient in the ED she was awake alert and conversant.  She is oriented x 3.  She told me that she is not feeling much better.  He states that she has been feeling somewhat sick for couple of days and was having vomiting, a few episodes and was dry heaving.  Denies any abdominal pain or diarrhea.  No pain fever or chills.  No cough or urinary symptoms.  Patient states that she stopped  taking Depakote which she used to take for some time.  She started taking Depakote 2 weeks ago and has been taking regularly with her  supervision.  patient reportedly was confused around midnight and was having word finding difficulties and irritable and repeating same words.  There was no any focal neurological deficit noted.  The only change in her medication reportedly was the Depakote.  She told me that she has history of hypothyroidism and takes levothyroxine.  In the ED when she was brought she was still having word finding difficulties.  Code stroke was called, a CT of the head stat without contrast was obtained which was negative for any acute intracranial abnormalities.  CT angiogram head and neck was also obtained negative for any occlusion or hemodynamically significant stenosis of the extracranial vessels.  Lab obtained showed hyperammonemia with ammonia level of 193 and normal LFTs.  Depakote level was 78.3.  Poison control was called and recommended to give L-carnitine 6000 mg which she was given.  Per ED provider, poison control recommended to continue giving L-carnitine 15 mg/kg every 6 hours until ammonia level is normal.  Neurology was consulted.  MRI was obtained.  MRI negative for acute stroke.  Per neurologist no need to pursue further workup for stroke as this is likely a stroke mimic due to toxic encephalopathy caused by hyperammonemia.   neurology suggested that lactulose may be given for her hyperammonemia along with L-carnitine.  Patient told me that she is feeling much better and does not feel the lightheadedness that she was having when she initially came in.  Lab results reviewed.  Normal sodium and potassium.  Is slightly increased serum creatinine level of 1.05 and anion gap of 16.  WBC count 3.1.  Normal hemoglobin.  Normal LFTs.  The therapeutic range of Depakote is 50- 125 ug/mL          Past Medical History:     Past Medical History:   Diagnosis Date    Chronic low back  pain     Primary thyroid cancer (H)             Past Surgical History:     Past Surgical History:   Procedure Laterality Date    HC HEMORROIDECTOMY, EXTERNAL, SINGLE COLUMN/GROUP  2006    HC REPAIR OF NASAL SEPTUM  2008    HC TOOTH EXTRACTION W/FORCEP  2010    nerve damage to sensation of lower jaw    MAMMOPLASTY AUGMENTATION  2004    THYROIDECTOMY  6/1/2012    Procedure:THYROIDECTOMY; Total Thyroidectomy and Selective Neck Dissection; Surgeon:KELSEY DONOVAN; Location:[facility] OR              Social History:   Reviewed  Social History     Socioeconomic History    Marital status:      Spouse name: Not on file    Number of children: Not on file    Years of education: Not on file    Highest education level: Not on file   Occupational History    Not on file   Tobacco Use    Smoking status: Not on file    Smokeless tobacco: Not on file   Substance and Sexual Activity    Alcohol use: Yes     Comment: Infrequently    Drug use: No    Sexual activity: Not on file   Other Topics Concern    Not on file   Social History Narrative    Not on file     Social Drivers of Health     Financial Resource Strain: Low Risk  (1/10/2024)    Received from [employer]    Financial Resource Strain     Difficulty of Paying Living Expenses: 3     Difficulty of Paying Living Expenses: Not on file   Food Insecurity: No Food Insecurity (2/13/2024)    [facility]    Food Insecurity     Do you worry your food will run out before you are able to buy more?: 1   Transportation Needs: No Transportation Needs (2/13/2024)    Received from [employer]    Transportation Needs     Does lack of transportation keep you from medical appointments?: 1     Does lack of transportation keep you from work, meetings or getting things that you need?: 1   Physical Activity: Not on file   Stress: Not on file   Social Connections: Socially Integrated  (2/13/2024)    Received from [email or web address] [address]    Social Connections     Do you often feel lonely or isolated from those around you?: 0   Interpersonal Safety: Not on file   Housing Stability: Low Risk  (2/13/2024)    Received from [email or web address] [facility] [address]    Housing Stability     What is your housing situation today?: 1            Family History:   Reviewed          Immunizations:     There is no immunization history on file for this patient.           Allergies:    No Known Allergies    Current Outpatient Medications   Medication Instructions    B Complex Vitamins (VITAMIN  B COMPLEX) tablet 1 tablet, DAILY    buprenorphine HCl-naloxone HCl (SUBOXONE) 8-2 MG per film 1 Film, Sublingual, 2 TIMES DAILY PRN    clonazePAM (KLONOPIN) 0.5 mg, Oral, 3 TIMES DAILY PRN    ibuprofen (ADVIL) 600 mg, Oral, 2 TIMES DAILY PRN    levothyroxine (SYNTHROID/LEVOTHROID) 150 mcg, Oral, DAILY    OLANZapine (ZYPREXA) 5 mg, Oral, 2 TIMES DAILY    OLANZapine (ZYPREXA) 5 mg, Oral, 2 TIMES DAILY    progesterone (PROMETRIUM) 200 mg, Oral, AT BEDTIME    Turmeric (QC TUMERIC COMPLEX PO) 1 capsule, Oral, DAILY    venlafaxine (EFFEXOR XR) 75 mg, DAILY    venlafaxine (EFFEXOR XR) 75 mg, Oral, DAILY            Review of Systems:   A complete ROS was performed and is negative other than what is stated in the HPI.          Physical Exam:   /83   Pulse 95   Temp 98.9  F (37.2  C)   Resp (!) 9   Wt 87.8 kg (193 lb 9 oz)   LMP 03/15/2012   SpO2 95%   BMI 32.21 kg/m       GENERAL: Alert and oriented x 3. NAD.   HEENT: Anicteric sclera. Mucous membranes moist.   CV: RRR. S1, S2. No murmurs appreciated.   RESPIRATORY: Effort normal on room air. Lungs CTAB with no wheezing, rales, rhonchi.   GI: Abdomen soft and non distended with normoactive bowel sounds present in all quadrants. No tenderness, rebound, guarding.   NEUROLOGICAL: No focal deficits. Moves all extremities.    EXTREMITIES: No  peripheral edema. Intact bilateral pedal pulses.   SKIN: No jaundice. No rashes.          Data:   CBC RESULTS:   Recent Labs   Lab Test 04/24/25 0210   WBC 3.1*   RBC 3.91   HGB 12.3   HCT 37.6   MCV 96   MCH 31.5   MCHC 32.7   RDW 15.0      Liver Function Studies -   Recent Labs   Lab Test 04/24/25 0210   PROTTOTAL 7.1   ALBUMIN 4.4   BILITOTAL 0.3   ALKPHOS 109   AST 26   ALT 36   Last Comprehensive Metabolic Panel:  Lab Results   Component Value Date     04/24/2025    POTASSIUM 4.3 04/24/2025    CHLORIDE 100 04/24/2025    CO2 22 04/24/2025    ANIONGAP 16 (H) 04/24/2025     (H) 04/24/2025    BUN 11.2 04/24/2025    CR 1.05 (H) 04/24/2025    GFRESTIMATED 64 04/24/2025    MICHELET 9.2 04/24/2025       INR   Date Value Ref Range Status   04/24/2025 1.09 0.85 - 1.15 Final     MR Brain w/o & w Contrast   Final Result   IMPRESSION:   1.  Normal head MRI.      CTA Head Neck with Contrast   Final Result   IMPRESSION:    HEAD CT:   No acute intracranial process.      HEAD CTA:   No large vessel occlusion or hemodynamically significant stenosis.      NECK CTA:   No large vessel occlusion or hemodynamically significant stenosis.        Dr. Stafford  was contacted by me on  4/24/2025 2:21 AM CDT   with the preliminary report.         CT Head w/o Contrast   Final Result   IMPRESSION:    HEAD CT:   No acute intracranial process.      HEAD CTA:   No large vessel occlusion or hemodynamically significant stenosis.      NECK CTA:   No large vessel occlusion or hemodynamically significant stenosis.        Dr. Stafford  was contacted by me on  4/24/2025 2:21 AM CDT   with the preliminary report.           Discussed with the patient.  All her questions answered.  Patient verbalized understanding and agreement with the plan.

## 2025-04-24 NOTE — INTERIM SUMMARY
MRI brain was negative for any acute ischemic injury.    No further stroke workup warranted at this juncture      Royer Hermosillo  Stroke Fellow

## 2025-04-24 NOTE — PHARMACY-ADMISSION MEDICATION HISTORY
"Pharmacist Admission Medication History    Admission medication history is complete. The information provided in this note is only as accurate as the sources available at the time of the update.    Information Source(s): Patient and CareEverywhere/SureScripts via in-person    Pertinent Information:   Pt knew her med hx well.   Zyprexa is prescribed 5-10 mg TID, but pt says she takes 10 mg BID. Says this is the med that \"put me in here (the hospital).\"  No longer on Depakote, PRN Haldol, and PRN Vistaril per pt.   Rarely takes Klonopin.     Changes made to PTA medication list:  Added: None  Deleted: Progesterone, Effexor  Changed: Zyprexa dose    Allergies reviewed with patient and updates made in EHR: no    Medication History Completed By: Chiquita Hightower [facility] 4/24/2025 8:43 AM    PTA Med List   Medication Sig Note Last Dose/Taking    B Complex Vitamins (VITAMIN  B COMPLEX) tablet Take 1 tablet by mouth daily.  Past Week    buprenorphine HCl-naloxone HCl (SUBOXONE) 8-2 MG per film Place 1 Film under the tongue 2 times daily.  4/23/2025 Morning    clonazePAM (KLONOPIN) 0.5 MG tablet Take 0.5 mg by mouth 3 times daily as needed for anxiety.  Past Month    ibuprofen (ADVIL/MOTRIN) 200 MG tablet Take 600 mg by mouth every 4 hours as needed for pain.  Past Month    levothyroxine (SYNTHROID, LEVOTHROID) 150 MCG tablet Take 1 tablet by mouth daily.  4/23/2025 Morning    OLANZapine (ZYPREXA) 10 MG tablet Take 10 mg by mouth 2 times daily. 4/24/2025: Prescribed 5-10 mg TID, but pt takes 10 mg BID 4/23/2025 Evening    Turmeric (QC TUMERIC COMPLEX PO) Take 1 capsule by mouth daily as needed (take with the ibuprofen for back pain).  Past Month     "

## 2025-04-24 NOTE — CONSULTS
[facility]  Initial Psychiatric Consult   Consult date: April 24, 2025         Reason for Consult, requesting source:    Hyperammonemia secondary to Depakote  Requesting source: Sonya Ng        HPI:   Ambar Borges is a 50 year old female who was admitted to [facility] on 4/24/25 with altered mental status after found by  around midnight last night dropping objects in the kitchen, appearing confused. Ammonia level on arrival was elevated at 193. Patient has been taking Depakote consistently for the past 2-3 weeks as administered by . Past psychiatric history notable for unspecified psychosis, major depression, anxiety, alcohol abuse, and opioid use disorder on Suboxone maintenance. Medical hx notable for thyroid carcinoma s/p thyroidectomy in 2012, and chronic back pain. Psychiatry is consulted for evaluation.     Per chart review,  has been administering patient's Depakote for at least the past 2 weeks as prior to this, she was not taking independently. She apparently developed acute confusion in the middle of the night last night. Stroke code was initially called, seen by vascular neurology who did not suspect acute stroke, and felt that symptoms were more consistent with encephalopathy secondary to hyperammonemia. MRI brain was negative for acute intracranial abnormalities. L-carnitine was initiated and ammonia level improved to 40 this morning, with return to baseline mentation. VPA level was 78.5 at 2:26 AM and 85.0 and 84.3 upon recheck this morning. Patient known to writer from previous [facility] visits in 2024. Hx of psychosis which began in 2024. No hx of dionicio or psychosis prior to this and had historically been treated for anxiety, panic attacks, and depression.     On interview with patient today, she presents as awake, alert, and fully oriented. She discusses that she does not remember what happened last night that led up to her  coming to the hospital. Prior to last night, she had been doing quite well. She was taking Depakote consistently for at least the last two weeks and was feeling better. Reports she had not been experiencing any hallucinations or delusional thinking while taking Depakote. Prior to initiating Depakote, she recalls experiencing auditory hallucinations of voices making derogatory remarks about her. She recalls this provider from her [facility] visits in 2024, which is when her psychosis symptoms first began. She denies any psychosis symptoms in her past. She does report that her mother was diagnosed with schizophrenia when her mother was around age 20. Patient recalls that since starting Depakote, she felt she was able to function better around the home, get more things done. She was sleeping well at night. Mood was positive. She was able to focus better. She is disappointed that this happened to her given how well she was doing. She denies any recent alcohol use. Denies any active psychosis symptoms. Denies any suicidal or homicidal thinking.         Past Psychiatric History:   Diagnoses: Unspecified psychotic disorder, MDD, KRYSTIN, panic attacks  Hospitalizations: No hx of hospitalizations. 2 [facility] visits in 2024  Medications/Treatments: sertraline, citalopram, escitalopram, fluoxetine, venlafaxine, duloxetine, imipramine, quetiapine and since 2024 - olanzapine, haloperidol, and Depakote  Suicide Attempts / Self-Injurious Behavior: no hx   Violent Behavior: no hx  Trauma History: no known hx  ECT Treatment: no hx  Engagement with Psychotherapy: has participated in IOP and individual therapy  History of Commitment: No hx        Substance Use and History:   Hx of alcohol abuse, denying any recent use.         Past Medical History:   PAST MEDICAL HISTORY:   Past Medical History:   Diagnosis Date    Chronic low back pain     Primary thyroid cancer (H)        PAST SURGICAL HISTORY:   Past Surgical History:   Procedure Laterality  Date    HC HEMORROIDECTOMY, EXTERNAL, SINGLE COLUMN/GROUP  2006    HC REPAIR OF NASAL SEPTUM  2008    HC TOOTH EXTRACTION W/FORCEP  2010    nerve damage to sensation of lower jaw    MAMMOPLASTY AUGMENTATION  2004    THYROIDECTOMY  6/1/2012    Procedure:THYROIDECTOMY; Total Thyroidectomy and Selective Neck Dissection; Surgeon:KELSEY DONOVAN; Location:[facility] OR             Family History:   Reports mother was diagnosed with schizophrenia at age 20        Social History:   SOCIAL HISTORY:   Social History     Tobacco Use    Smoking status: Not on file    Smokeless tobacco: Not on file   Substance Use Topics    Alcohol use: Yes     Comment: Infrequently     Pt has not been working since 2020. , lives with .         Physical ROS:   The 10-point review of systems was negative except as noted in HPI.         PTA Medications:     Medications Prior to Admission   Medication Sig Dispense Refill Last Dose/Taking    B Complex Vitamins (VITAMIN  B COMPLEX) tablet Take 1 tablet by mouth daily.   Past Week    buprenorphine HCl-naloxone HCl (SUBOXONE) 8-2 MG per film Place 1 Film under the tongue 2 times daily.   4/23/2025 Morning    clonazePAM (KLONOPIN) 0.5 MG tablet Take 0.5 mg by mouth 3 times daily as needed for anxiety.   Past Month    ibuprofen (ADVIL/MOTRIN) 200 MG tablet Take 600 mg by mouth every 4 hours as needed for pain.   Past Month    levothyroxine (SYNTHROID, LEVOTHROID) 150 MCG tablet Take 1 tablet by mouth daily. 90 tablet 3 4/23/2025 Morning    OLANZapine (ZYPREXA) 10 MG tablet Take 10 mg by mouth 2 times daily.   4/23/2025 Evening    Turmeric (QC TUMERIC COMPLEX PO) Take 1 capsule by mouth daily as needed (take with the ibuprofen for back pain).   Past Month          Allergies:   No Known Allergies       Labs:     Recent Results (from the past 48 hours)   Basic metabolic panel    Collection Time: 04/24/25  2:10 AM   Result Value Ref Range    Sodium 138 135 - 145 mmol/L    Potassium 4.3 3.4 - 5.3 mmol/L     Chloride 100 98 - 107 mmol/L    Carbon Dioxide (CO2) 22 22 - 29 mmol/L    Anion Gap 16 (H) 7 - 15 mmol/L    Urea Nitrogen 11.2 6.0 - 20.0 mg/dL    Creatinine 1.05 (H) 0.51 - 0.95 mg/dL    GFR Estimate 64 >60 mL/min/1.73m2    Calcium 9.2 8.8 - 10.4 mg/dL    Glucose 115 (H) 70 - 99 mg/dL   INR    Collection Time: 04/24/25  2:10 AM   Result Value Ref Range    INR 1.09 0.85 - 1.15   Partial thromboplastin time    Collection Time: 04/24/25  2:10 AM   Result Value Ref Range    aPTT 27 22 - 38 Seconds   Troponin T, High Sensitivity    Collection Time: 04/24/25  2:10 AM   Result Value Ref Range    Troponin T, High Sensitivity 6 <=14 ng/L   hCG Qualitative Pregnancy    Collection Time: 04/24/25  2:10 AM   Result Value Ref Range    hCG Serum Qualitative Negative Negative   CBC with platelets and differential    Collection Time: 04/24/25  2:10 AM   Result Value Ref Range    WBC Count 3.1 (L) 4.0 - 11.0 10e3/uL    RBC Count 3.91 3.80 - 5.20 10e6/uL    Hemoglobin 12.3 11.7 - 15.7 g/dL    Hematocrit 37.6 35.0 - 47.0 %    MCV 96 78 - 100 fL    MCH 31.5 26.5 - 33.0 pg    MCHC 32.7 31.5 - 36.5 g/dL    RDW 15.0 10.0 - 15.0 %    Platelet Count 170 150 - 450 10e3/uL    % Neutrophils 35 %    % Lymphocytes 55 %    % Monocytes 8 %    % Eosinophils 1 %    % Basophils 1 %    % Immature Granulocytes 0 %    NRBCs per 100 WBC 0 <1 /100    Absolute Neutrophils 1.1 (L) 1.6 - 8.3 10e3/uL    Absolute Lymphocytes 1.7 0.8 - 5.3 10e3/uL    Absolute Monocytes 0.3 0.0 - 1.3 10e3/uL    Absolute Eosinophils 0.0 0.0 - 0.7 10e3/uL    Absolute Basophils 0.0 0.0 - 0.2 10e3/uL    Absolute Immature Granulocytes 0.0 <=0.4 10e3/uL    Absolute NRBCs 0.0 10e3/uL   Hepatic function panel    Collection Time: 04/24/25  2:10 AM   Result Value Ref Range    Protein Total 7.1 6.4 - 8.3 g/dL    Albumin 4.4 3.5 - 5.2 g/dL    Bilirubin Total 0.3 <=1.2 mg/dL    Alkaline Phosphatase 109 40 - 150 U/L    AST 26 0 - 45 U/L    ALT 36 0 - 50 U/L    Bilirubin Direct 0.17 0.00 -  0.30 mg/dL   Alcohol level blood    Collection Time: 04/24/25  2:10 AM   Result Value Ref Range    Alcohol ethyl <0.01 <=0.01 g/dL   Ammonia (on ice)    Collection Time: 04/24/25  2:12 AM   Result Value Ref Range    Ammonia 193 (HH) 11 - 51 umol/L   Valproic acid (Depakote level)    Collection Time: 04/24/25  2:26 AM   Result Value Ref Range    Valproic acid 78.5   ug/mL   EKG 12-lead, tracing only    Collection Time: 04/24/25  2:47 AM   Result Value Ref Range    Systolic Blood Pressure  mmHg    Diastolic Blood Pressure  mmHg    Ventricular Rate 92 BPM    Atrial Rate 92 BPM    OK Interval 150 ms    QRS Duration 90 ms     ms    QTc 447 ms    P Axis 27 degrees    R AXIS 19 degrees    T Axis 29 degrees    Interpretation ECG       Sinus rhythm  Normal ECG  When compared with ECG of 12-Oct-2024 13:10,  Questionable change in QRS axis     Troponin T, High Sensitivity    Collection Time: 04/24/25  6:22 AM   Result Value Ref Range    Troponin T, High Sensitivity <6 <=14 ng/L   Valproic acid    Collection Time: 04/24/25  6:22 AM   Result Value Ref Range    Valproic acid 85.0   ug/mL   Basic metabolic panel    Collection Time: 04/24/25  6:22 AM   Result Value Ref Range    Sodium 140 135 - 145 mmol/L    Potassium 4.3 3.4 - 5.3 mmol/L    Chloride 102 98 - 107 mmol/L    Carbon Dioxide (CO2) 17 (L) 22 - 29 mmol/L    Anion Gap 21 (H) 7 - 15 mmol/L    Urea Nitrogen 11.8 6.0 - 20.0 mg/dL    Creatinine 0.92 0.51 - 0.95 mg/dL    GFR Estimate 75 >60 mL/min/1.73m2    Calcium 8.9 8.8 - 10.4 mg/dL    Glucose 112 (H) 70 - 99 mg/dL   Valproic acid    Collection Time: 04/24/25  7:54 AM   Result Value Ref Range    Valproic acid 84.3   ug/mL   Ammonia    Collection Time: 04/24/25  7:54 AM   Result Value Ref Range    Ammonia 40 11 - 51 umol/L          Physical and Psychiatric Examination:     BP (!) 148/93 (BP Location: Left arm)   Pulse 89   Temp 97.9  F (36.6  C) (Oral)   Resp 15   Wt 87.8 kg (193 lb 9 oz)   LMP 03/15/2012   SpO2  97%   BMI 32.21 kg/m    Weight is 193 lbs 9.02 oz  Body mass index is 32.21 kg/m .    Physical Exam:  I have reviewed the physical exam as documented by by the medical team and agree with findings and assessment and have no additional findings to add at this time.    Mental Status Exam:  Appearance: awake, alert, adequately groomed, appeared as age stated, and dressed in hospital gown  Attitude:  cooperative  Eye Contact:  good  Mood:  anxious  Affect:  mood congruent and intensity is normal  Speech:  clear, coherent  Psychomotor Behavior:   facial twitches noted, possible tardive dyskinesia. Did not observe tremors or dystonia.   Thought Process:  linear and organized  Associations:  no loose associations  Thought Content:  no evidence of suicidal ideation or homicidal ideation, no evidence of psychotic thought, no auditory hallucinations present, and no visual hallucinations present  Insight:  fair  Judgement:  fair  Oriented to:  time, person, and place  Attention Span and Concentration:  fair  Recent and Remote Memory:  fair  Language: able to name/identify objects without impairment  Fund of Knowledge: intact with awareness of current and past events           DSM-5 Diagnosis:   Encephalopathy secondary to hyperammonemia, resolved  Hyperammonemia with normal Depakote level, unknown etiology. Consider liver disease, urea cycle disorder, mitochondrial disorder leading to impaired ammonia clearance.   Unspecified psychotic disorder, symptoms began in 2024  Major Depressive Disorder, recurrent  Generalized Anxiety Disorder  Hx of alcohol abuse  Opioid use disorder, on maintenance treatment          Assessment:   Ambar Borges is a 50 year old female who was admitted to [facility] on 4/24/25 with altered mental status after found by  around midnight last night dropping objects in the kitchen, appearing confused. Ammonia level on arrival was elevated at 193. Patient has been taking  Depakote consistently for the past 2-3 weeks as administered by . Past psychiatric history notable for unspecified psychosis, major depression, anxiety, alcohol abuse, and opioid use disorder on Suboxone maintenance.     Today, mentation has returned to baseline. See discussion below.           Summary of Recommendations:   1) Patient presents with hyperammonemia with a therapeutic Depakote level, suggestive of impaired ammonia clearance of unknown etiology (consider liver dysfunction, urea cycle abnormalities, mitochondrial disorders). It would be very prudent to exclude Urea Cycle Disorders which often cause carnitine deficiencies. Given normal LFTs, there is likely a metabolic cause for development of hyperammonemia, which may have been triggered by Depakote.   2) For now, continue oral olanzapine 10 mg BID for mood stabilization and treatment of psychosis.   3) Could consider alternative mood stabilizer, such as lithium or oxcarbazepine. Patent is psychiatrically stable at this time. Will defer additional mood stabilizing agents to her outpatient psychiatrist.   4) Attempted to contact outpatient psychiatrist, who is out of office today  5) Continue Suboxone maintenance 8-2 mg bid for opioid use disorder maintenance  6) Reconsult psychiatry as needed        Parker Stanford, ANA-BC, APRN, CNP  Consult/Liaison Psychiatry  [facility]  Provider can be paged via [employer]  If I am unavailable, please contact [facility] at [phone] to reach the on-call provider.

## 2025-04-25 VITALS
TEMPERATURE: 98.2 F | WEIGHT: 193.56 LBS | SYSTOLIC BLOOD PRESSURE: 121 MMHG | DIASTOLIC BLOOD PRESSURE: 91 MMHG | RESPIRATION RATE: 16 BRPM | HEART RATE: 91 BPM | OXYGEN SATURATION: 95 % | BODY MASS INDEX: 32.21 KG/M2

## 2025-04-25 VITALS
WEIGHT: 193.56 LBS | HEART RATE: 93 BPM | RESPIRATION RATE: 17 BRPM | OXYGEN SATURATION: 97 % | BODY MASS INDEX: 32.21 KG/M2 | SYSTOLIC BLOOD PRESSURE: 134 MMHG | TEMPERATURE: 98.7 F | DIASTOLIC BLOOD PRESSURE: 91 MMHG

## 2025-04-25 LAB
ALBUMIN SERPL BCG-MCNC: 3.7 G/DL (ref 3.5–5.2)
ALP SERPL-CCNC: 93 U/L (ref 40–150)
ALT SERPL W P-5'-P-CCNC: 25 U/L (ref 0–50)
AMMONIA PLAS-SCNC: 27 UMOL/L (ref 11–51)
AMMONIA PLAS-SCNC: 31 UMOL/L (ref 11–51)
AMMONIA PLAS-SCNC: 45 UMOL/L (ref 11–51)
ANION GAP SERPL CALCULATED.3IONS-SCNC: 12 MMOL/L (ref 7–15)
AST SERPL W P-5'-P-CCNC: 17 U/L (ref 0–45)
BILIRUB SERPL-MCNC: 0.5 MG/DL
BUN SERPL-MCNC: 12.9 MG/DL (ref 6–20)
CALCIUM SERPL-MCNC: 8.6 MG/DL (ref 8.8–10.4)
CHLORIDE SERPL-SCNC: 107 MMOL/L (ref 98–107)
CREAT SERPL-MCNC: 0.88 MG/DL (ref 0.51–0.95)
EGFRCR SERPLBLD CKD-EPI 2021: 79 ML/MIN/1.73M2
ERYTHROCYTE [DISTWIDTH] IN BLOOD BY AUTOMATED COUNT: 14.6 % (ref 10–15)
GLUCOSE SERPL-MCNC: 106 MG/DL (ref 70–99)
HCO3 SERPL-SCNC: 21 MMOL/L (ref 22–29)
HCT VFR BLD AUTO: 33.1 % (ref 35–47)
HGB BLD-MCNC: 11 G/DL (ref 11.7–15.7)
MCH RBC QN AUTO: 31.9 PG (ref 26.5–33)
MCHC RBC AUTO-ENTMCNC: 33.2 G/DL (ref 31.5–36.5)
MCV RBC AUTO: 96 FL (ref 78–100)
PLATELET # BLD AUTO: 145 10E3/UL (ref 150–450)
POTASSIUM SERPL-SCNC: 3.6 MMOL/L (ref 3.4–5.3)
PROT SERPL-MCNC: 5.9 G/DL (ref 6.4–8.3)
RBC # BLD AUTO: 3.45 10E6/UL (ref 3.8–5.2)
SODIUM SERPL-SCNC: 140 MMOL/L (ref 135–145)
WBC # BLD AUTO: 3.3 10E3/UL (ref 4–11)

## 2025-04-25 PROCEDURE — 85027 COMPLETE CBC AUTOMATED: CPT | Performed by: HOSPITALIST

## 2025-04-25 PROCEDURE — 36415 COLL VENOUS BLD VENIPUNCTURE: CPT | Performed by: INTERNAL MEDICINE

## 2025-04-25 PROCEDURE — 99239 HOSP IP/OBS DSCHRG MGMT >30: CPT | Performed by: HOSPITALIST

## 2025-04-25 PROCEDURE — 82140 ASSAY OF AMMONIA: CPT | Performed by: INTERNAL MEDICINE

## 2025-04-25 PROCEDURE — 250N000013 HC RX MED GY IP 250 OP 250 PS 637: Performed by: INTERNAL MEDICINE

## 2025-04-25 PROCEDURE — G0378 HOSPITAL OBSERVATION PER HR: HCPCS

## 2025-04-25 PROCEDURE — 250N000013 HC RX MED GY IP 250 OP 250 PS 637: Performed by: NURSE PRACTITIONER

## 2025-04-25 PROCEDURE — 250N000012 HC RX MED GY IP 250 OP 636 PS 637: Performed by: NURSE PRACTITIONER

## 2025-04-25 PROCEDURE — 80053 COMPREHEN METABOLIC PANEL: CPT | Performed by: HOSPITALIST

## 2025-04-25 RX ORDER — ACETAMINOPHEN 325 MG/1
650 TABLET ORAL EVERY 6 HOURS PRN
Status: DISCONTINUED | OUTPATIENT
Start: 2025-04-25 | End: 2025-04-25 | Stop reason: HOSPADM

## 2025-04-25 RX ORDER — ACETAMINOPHEN 650 MG/1
650 SUPPOSITORY RECTAL EVERY 4 HOURS PRN
Status: DISCONTINUED | OUTPATIENT
Start: 2025-04-25 | End: 2025-04-25 | Stop reason: HOSPADM

## 2025-04-25 RX ADMIN — LACTULOSE 20 G: 20 SOLUTION ORAL at 08:12

## 2025-04-25 RX ADMIN — LEVOTHYROXINE SODIUM 150 MCG: 150 TABLET ORAL at 08:12

## 2025-04-25 RX ADMIN — ACETAMINOPHEN 650 MG: 325 TABLET, FILM COATED ORAL at 06:53

## 2025-04-25 RX ADMIN — BUPRENORPHINE AND NALOXONE 1 FILM: 8; 2 FILM BUCCAL; SUBLINGUAL at 08:12

## 2025-04-25 RX ADMIN — OLANZAPINE 10 MG: 5 TABLET, FILM COATED ORAL at 08:12

## 2025-04-25 RX ADMIN — ACETAMINOPHEN 650 MG: 325 TABLET, FILM COATED ORAL at 11:57

## 2025-04-25 ASSESSMENT — ACTIVITIES OF DAILY LIVING (ADL)
ADLS_ACUITY_SCORE: 17

## 2025-04-25 NOTE — PLAN OF CARE
Goal Outcome Evaluation:       Summary:  SHABANA, Depakote toxicity  DATE & TIME: 4/25/25 3151-3214  Cognitive Concerns/ Orientation : AxO x4, calm and cooperative, pleasant.  BEHAVIOR & AGGRESSION TOOL COLOR: Green  ABNL VS/O2: VSS RA  MOBILITY: Independent  PAIN MANAGMENT: Denies  DIET: Regular- tolerating   BOWEL/BLADDER: Continent, BM x1 reported this shift.  ABNL LAB/BG: Ammonia 27, 31  DRAIN/DEVICES: None  TELEMETRY RHYTHM: NA  SKIN: Intact, trace edema BLE  TESTS/PROCEDURES: None new  D/C DAY/GOALS/PLACE: Home today  OTHER IMPORTANT INFO: None      Observation goals  PRIOR TO DISCHARGE       Comments: -diagnostic tests and consults completed and resulted- met  -vital signs normal or at patient baseline- met    .Discharge    Patient discharged to home via car with   Care plan note complete    Listed belongings gathered and given to patient (including from security/pharmacy). Yes  Care Plan and Patient education resolved: Yes  Prescriptions if needed, hard copies sent with patient  NA  Medication Bin checked and emptied on discharge Yes  SW/care coordinator/charge RN aware of discharge: Yes

## 2025-04-25 NOTE — PROVIDER NOTIFICATION
MD Notification    Notified Person: MD    Notified Person Name: Dr. Freire    Notification Date/Time: 4/24/25 1948    Notification Interaction: Vocera page    Purpose of Notification: Ammonia 38, still give dose levocarnitine?    Orders Received: Hold dose,  recheck ammonia in 6 hrs, give dose if ammonia high.    Comments:

## 2025-04-25 NOTE — PROGRESS NOTES
Diagnosis: AMS, Depakote toxicity  Mental Status: A&O x4; neuros intact  Activity/dangle: Independent   Diet: Tolerating a regula diet  Gonzalez/Voiding: Voiding in the bathroom  Pain: Denies  Tele/Restraints/Iso: N/A   02/LDA: On room air, PIV SL.   D/C Date: TBD   Other info: Neurology and psych signed off.

## 2025-04-25 NOTE — PROVIDER NOTIFICATION
MD Notification    Notified Person: MD    Notified Person Name: Dr. Ng    Notification Date/Time: 4/25/25 0629    Notification Interaction: Vocera page    Purpose of Notification: Pt requesting medication for headache    Orders Received: PRN Tylenol    Comments:

## 2025-04-25 NOTE — PLAN OF CARE
DATE & TIME: 4/25/25 8553-0692  Cognitive Concerns/ Orientation : AxO x4, calm and cooperative, pleasant.  BEHAVIOR & AGGRESSION TOOL COLOR: Green  ABNL VS/O2: VSS RA  MOBILITY: Independent  PAIN MANAGMENT: PRN Tylenol given x1 this morning for a headache.  DIET: Regular  BOWEL/BLADDER: Continent, BM x1 reported this shift.  ABNL LAB/BG: Ammonia 38 improved from 80  DRAIN/DEVICES: PIV L arm saline locked  TELEMETRY RHYTHM: NA  SKIN: Intact, trace edema BLE  TESTS/PROCEDURES: None new  D/C DAY/GOALS/PLACE: Pending  OTHER IMPORTANT INFO: None      Observation goals  PRIOR TO DISCHARGE       Comments: -diagnostic tests and consults completed and resulted- met  -vital signs normal or at patient baseline- met

## 2025-04-25 NOTE — DISCHARGE SUMMARY
Discharge Summary  Hospitalist    Date of Admission:  4/24/2025  Date of Discharge:  4/25/2025  Discharging Provider: Ana Luisa Freire MD, MD  Date of Service (when I saw the patient): 04/25/25    Discharge Diagnoses   Altered mental status, presumed to be secondary to acute toxic encephalopathy due to hyperammonemia likely caused by Depakote       History of Present Illness   Please refer H & P for details.      Hospital Course   Ambar Borges is a 50 year old female with past medical history of bipolar disorder on Depakote was brought to the ED with acute onset confusion.  Patient was noted to have word finding difficulty.  Reportedly had confusion which started around midnight tonight.        Altered mental status, presumed to be secondary to acute toxic encephalopathy due to hyperammonemia likely caused by Depakote     Poison control was consulted.  Ammonia level 193.  Depakote within therapeutic range for dionicio which is .  Depakote level was 78.3.  Acute confusion with no focal neurological deficits.  Symptoms began to resolve in the ED.  CT of the head, CTA head and neck without any acute intracranial abnormalities, occlusion of large extracranial vessels or hemodynamically significant stenosis.  Was not a candidate for thrombolytics or endovascular intervention due to quick resolution of symptoms and absence of hemodynamically significant stenosis or occlusion.  Neurology consulted.  MRI brain with no acute findings.  Neurology signed off.  Patient has been taking Depakote consistently for the past 2-3 weeks as administered by .  Patient presents with hyperammonemia with a therapeutic Depakote level, suggestive of impaired ammonia clearance of unknown etiology (consider liver dysfunction, urea cycle abnormalities, mitochondrial disorders).   Patient was given L-carnitine 6000 mg IV.  Per ED provider Poison control recommended to give L-carnitine 15 mg/kg every 6 hours until ammonia level within  normal limits.  This plan was administered.  Also given lactulose for hyperammonemia.   - Patient feeling significantly improved next morning, back to baseline.  As ammonia level had normalized to 40, plan initially was to discharge patient home.  However repeat ammonia level later in the day increased again to 80.  Patient clinically remained the same at baseline mentation.  However given ammonia trend up, canceled discharge plan.  Given additional dose of L-carnitine and continued to monitor ammonia levels every 6 hours.  After this ammonia levels normalized and remained normal through the next day.  Patient discharged home in stable condition on 4/25/2025.  [Her last dose of Depakote was 4/20 3 PM and hence she just likely required a longer washout period.]     History of bipolar disorder, anxiety.  On Zyprexa and Klonopin as needed, Depakote scheduled  H/o Malignant hyperthermia  Chronic back pain  Opioid use disorder  Psychiatry was consulted.  They recommended discontinuing the Depakote.  Patient will need to be tested for urea cycle disorders which often cause carnitine deficiencies.  Will defer to PCP to investigate this further,?  Likely needs referral to genetics for further testing as outpatient.  - Plan to continue on olanzapine.  - Close follow-up with primary psychiatrist as outpatient to consider additional mood stabilizing agents as indicated.  - Continue PTA maintenance Suboxone for opioid use disorder     Nausea with vomiting, Zofran IV or oral as needed.  EKG showed normal QTc/QT intervals.  Slightly elevated serum creatinine level, mild anion gap.  Anion gap 16.  Continue normal saline IV, normal creatinine at discharge.  Dizziness and giddiness.  Now significantly improved after IV fluid and L-carnitine.  CT and MRI brain negative for acute stroke.  EKG is also negative for ischemic changes or arrhythmia.       Mild pancytopenia  Could be dilutional,?  Related to Depakote.  Recheck CBC as  outpatient.       Ana Luisa Freire MD, MD      Pending Results   These results will be followed up by Hospitalist team.  Unresulted Labs Ordered in the Past 30 Days of this Admission       No orders found for last 31 day(s).            Code Status   Full Code       Primary Care Physician   Physician No Ref-Primary    Follow-ups Needed After Discharge   Follow-up Appointments       Follow Up      Follow up with primary psychiatrist in 1 week.        Hospital to Primary Care - Establish PCP Referral      Please be aware that coverage of these services is subject to the terms and limitations of your health insurance plan.  Call member services at your health plan with any benefit or coverage questions.    Schedule Primary Care visit within: 7 Days   Recommended labs and Imaging (to be ordered by Primary Care Provider): CBC               Physical Exam   Temp: 98.2  F (36.8  C) Temp src: Oral BP: (!) 121/91 Pulse: 91   Resp: 16 SpO2: 95 % O2 Device: None (Room air)    Vitals:    04/24/25 0203   Weight: 87.8 kg (193 lb 9 oz)     Vital Signs with Ranges  Temp:  [98.1  F (36.7  C)-98.8  F (37.1  C)] 98.2  F (36.8  C)  Pulse:  [84-93] 91  Resp:  [16-17] 16  BP: (121-147)/(86-92) 121/91  SpO2:  [95 %-98 %] 95 %  No intake/output data recorded.    Constitutional: Alert, cooperative, no apparent distress  Respiratory: Non labored breathing, clear to auscultation bilaterally, no crackles or wheezing  Cardiovascular: Regular rate and rhythm, no murmurs, no edema  GI: Normal bowel sounds, soft, non-distended, non-tender  Skin: No obvious rash  Neuro: Alert, engages in appropriate conversation, fluent speech, moving all extremities, no facial asymmetry  Psych: Calm and pleasant, no obvious anxiety/ depression      Discharge Disposition   Discharged to home  Condition at discharge: Stable    Consultations This Hospital Stay   PSYCHIATRY IP CONSULT  VASCULAR ACCESS ADULT IP CONSULT    Time Spent on this Encounter   IAna Luisa,  MD, personally saw the patient today and spent greater than 30 minutes discharging this patient.    Discharge Orders      Adult Neurology [facility] to Primary Care - Establish PCP Referral      Reason for your hospital stay    Elevated ammonia causing confusion. Sopped depakote which likely caused this.     Activity    Your activity upon discharge: activity as tolerated     Follow Up    Follow up with primary psychiatrist in 1 week.     When to contact your care team    Call your primary doctor if you have any of the following: worsening confusion, pain, lethargy, fever.     Diet    Follow this diet upon discharge: Current Diet:Orders Placed This Encounter      Regular Diet Adult     Discharge Medications   Discharge Medication List as of 4/25/2025 12:39 PM        CONTINUE these medications which have NOT CHANGED    Details   B Complex Vitamins (VITAMIN  B COMPLEX) tablet Take 1 tablet by mouth daily., Historical      buprenorphine HCl-naloxone HCl (SUBOXONE) 8-2 MG per film Place 1 Film under the tongue 2 times daily., Historical      clonazePAM (KLONOPIN) 0.5 MG tablet Take 0.5 mg by mouth 3 times daily as needed for anxiety., Historical      ibuprofen (ADVIL/MOTRIN) 200 MG tablet Take 600 mg by mouth every 4 hours as needed for pain., Historical      levothyroxine (SYNTHROID, LEVOTHROID) 150 MCG tablet Take 1 tablet by mouth daily., Disp-90 tablet, R-3, Fax      OLANZapine (ZYPREXA) 10 MG tablet Take 10 mg by mouth 2 times daily., Historical      Turmeric (QC TUMERIC COMPLEX PO) Take 1 capsule by mouth daily as needed (take with the ibuprofen for back pain)., Historical           Allergies   No Known Allergies  Data   Most Recent 3 CBC's:  Recent Labs   Lab Test 04/25/25  0606 04/24/25  0210 10/12/24  1331   WBC 3.3* 3.1* 7.7   HGB 11.0* 12.3 12.3   MCV 96 96 91   * 170 256      Most Recent 3 BMP's:  Recent Labs   Lab Test 04/25/25  0606 04/24/25  0622 04/24/25  0210    140 138    POTASSIUM 3.6 4.3 4.3   CHLORIDE 107 102 100   CO2 21* 17* 22   BUN 12.9 11.8 11.2   CR 0.88 0.92 1.05*   ANIONGAP 12 21* 16*   MICHELET 8.6* 8.9 9.2   * 112* 115*     Most Recent 2 LFT's:  Recent Labs   Lab Test 04/25/25  0606 04/24/25  0210   AST 17 26   ALT 25 36   ALKPHOS 93 109   BILITOTAL 0.5 0.3     Most Recent INR's and Anticoagulation Dosing History:  Anticoagulation Dose History          Latest Ref Rng & Units 4/24/2025   Recent Dosing and Labs   INR 0.85 - 1.15 1.09      Most Recent 3 Troponin's:No lab results found.  Most Recent Cholesterol Panel:No lab results found.  Most Recent 6 Bacteria Isolates From Any Culture (See EPIC Reports for Culture Details):No lab results found.  Most Recent TSH, T4 and A1c Labs:  Recent Labs   Lab Test 04/24/25  0622 09/14/24 2038   TSH 4.03 0.72   T4  --  1.55       Results for orders placed or performed during the hospital encounter of 04/24/25   CT Head w/o Contrast    Narrative    EXAM: CT HEAD W/O CONTRAST, CTA HEAD NECK W CONTRAST  LOCATION: [facility]  DATE/TIME: 4/24/2025 2:17 AM CDT    INDICATION: Code Stroke, rule out hemorrhage and evaluate for potential thrombolysis thrombectomy. PLEASE READ IMMEDIATELY.  COMPARISON: None.  CONTRAST: 67 mL Isovue   370  TECHNIQUE: Head and neck CT angiogram with IV contrast. Noncontrast head CT followed by axial helical CT images of the head and neck vessels obtained during the arterial phase of intravenous contrast administration. Axial 2D reconstructed images and   multiplanar 3D MIP reconstructed images of the head and neck vessels were performed by the technologist. Dose reduction techniques were used. All stenosis measurements made according to NASCET criteria unless otherwise specified.    FINDINGS:   NONCONTRAST HEAD CT:   INTRACRANIAL CONTENTS: No intracranial hemorrhage, extraaxial collection, or mass effect.  No CT evidence of acute infarct. Normal parenchymal attenuation. Normal  ventricles and sulci.     VISUALIZED ORBITS/SINUSES/MASTOIDS: No intraorbital abnormality. Mild mucosal thickening scattered about the paranasal sinuses. No middle ear or mastoid effusion.    BONES/SOFT TISSUES: No acute abnormality.    HEAD CTA:  ANTERIOR CIRCULATION: No stenosis/occlusion, aneurysm, or high flow vascular malformation. Fetal origin of the left posterior cerebral artery from the anterior circulation.    POSTERIOR CIRCULATION: No stenosis/occlusion, aneurysm, or high flow vascular malformation. Dominant right and smaller left vertebral artery contribute to a normal basilar artery.     DURAL VENOUS SINUSES: Expected enhancement of the major dural venous sinuses.    NECK CTA:  RIGHT CAROTID: No measurable stenosis or dissection.    LEFT CAROTID: No measurable stenosis or dissection.    VERTEBRAL ARTERIES: No focal stenosis or dissection. Dominant right and smaller left vertebral arteries.    AORTIC ARCH: Classic aortic arch anatomy with no significant stenosis at the origin of the great vessels.    NONVASCULAR STRUCTURES: Unremarkable.      Impression    IMPRESSION:   HEAD CT:  No acute intracranial process.    HEAD CTA:  No large vessel occlusion or hemodynamically significant stenosis.    NECK CTA:  No large vessel occlusion or hemodynamically significant stenosis.      Dr. Stafford  was contacted by me on  4/24/2025 2:21 AM CDT   with the preliminary report.     CTA Head Neck with Contrast    Narrative    EXAM: CT HEAD W/O CONTRAST, CTA HEAD NECK W CONTRAST  LOCATION: [facility]  DATE/TIME: 4/24/2025 2:17 AM CDT    INDICATION: Code Stroke, rule out hemorrhage and evaluate for potential thrombolysis thrombectomy. PLEASE READ IMMEDIATELY.  COMPARISON: None.  CONTRAST: 67 mL Isovue   370  TECHNIQUE: Head and neck CT angiogram with IV contrast. Noncontrast head CT followed by axial helical CT images of the head and neck vessels obtained during the arterial phase of intravenous  contrast administration. Axial 2D reconstructed images and   multiplanar 3D MIP reconstructed images of the head and neck vessels were performed by the technologist. Dose reduction techniques were used. All stenosis measurements made according to NASCET criteria unless otherwise specified.    FINDINGS:   NONCONTRAST HEAD CT:   INTRACRANIAL CONTENTS: No intracranial hemorrhage, extraaxial collection, or mass effect.  No CT evidence of acute infarct. Normal parenchymal attenuation. Normal ventricles and sulci.     VISUALIZED ORBITS/SINUSES/MASTOIDS: No intraorbital abnormality. Mild mucosal thickening scattered about the paranasal sinuses. No middle ear or mastoid effusion.    BONES/SOFT TISSUES: No acute abnormality.    HEAD CTA:  ANTERIOR CIRCULATION: No stenosis/occlusion, aneurysm, or high flow vascular malformation. Fetal origin of the left posterior cerebral artery from the anterior circulation.    POSTERIOR CIRCULATION: No stenosis/occlusion, aneurysm, or high flow vascular malformation. Dominant right and smaller left vertebral artery contribute to a normal basilar artery.     DURAL VENOUS SINUSES: Expected enhancement of the major dural venous sinuses.    NECK CTA:  RIGHT CAROTID: No measurable stenosis or dissection.    LEFT CAROTID: No measurable stenosis or dissection.    VERTEBRAL ARTERIES: No focal stenosis or dissection. Dominant right and smaller left vertebral arteries.    AORTIC ARCH: Classic aortic arch anatomy with no significant stenosis at the origin of the great vessels.    NONVASCULAR STRUCTURES: Unremarkable.      Impression    IMPRESSION:   HEAD CT:  No acute intracranial process.    HEAD CTA:  No large vessel occlusion or hemodynamically significant stenosis.    NECK CTA:  No large vessel occlusion or hemodynamically significant stenosis.      Dr. Stafford  was contacted by me on  4/24/2025 2:21 AM CDT   with the preliminary report.     MR Brain w/o & w Contrast    Narrative    EXAM: MR BRAIN  W/O and W CONTRAST  LOCATION: [facility]  DATE: 4/24/2025    INDICATION: confusion, expressive aphasia. rule out stroke  COMPARISON: CT head April 24, 2025   CONTRAST: 9 mL Gadavist  TECHNIQUE: Routine multiplanar multisequence head MRI without and with intravenous contrast.    FINDINGS:  INTRACRANIAL CONTENTS: No acute or subacute infarct. No mass, acute hemorrhage, or extra-axial fluid collections. Normal brain parenchymal signal. Normal ventricles and sulci. Normal position of the cerebellar tonsils. No pathologic contrast enhancement.    SELLA: No abnormality accounting for technique.    OSSEOUS STRUCTURES/SOFT TISSUES: Normal marrow signal. The major intracranial vascular flow voids are maintained.     ORBITS: No abnormality accounting for technique.     SINUSES/MASTOIDS: Mild mucosal thickening scattered about the paranasal sinuses. No middle ear or mastoid effusion.       Impression    IMPRESSION:  1.  Normal head MRI.

## 2025-04-28 ENCOUNTER — PATIENT OUTREACH (OUTPATIENT)
Dept: CARE COORDINATION | Facility: CLINIC | Age: 50
End: 2025-04-28
Payer: COMMERCIAL